# Patient Record
Sex: FEMALE | Race: WHITE | Employment: UNEMPLOYED | URBAN - METROPOLITAN AREA
[De-identification: names, ages, dates, MRNs, and addresses within clinical notes are randomized per-mention and may not be internally consistent; named-entity substitution may affect disease eponyms.]

---

## 2017-01-27 ENCOUNTER — HOSPITAL ENCOUNTER (INPATIENT)
Age: 57
LOS: 14 days | Discharge: HOME | DRG: 885 | End: 2017-02-10
Attending: PSYCHIATRY & NEUROLOGY
Payer: MEDICARE

## 2017-01-27 PROBLEM — R41.89 ALTERED THOUGHT PROCESSES: Status: ACTIVE | Noted: 2017-01-27

## 2017-01-27 PROBLEM — F06.1 CATATONIC STATE: Status: ACTIVE | Noted: 2017-01-27

## 2017-01-27 PROBLEM — F31.64 BIPOLAR AFFECTIVE, MIXED, SEV W/ PSYCH: Status: ACTIVE | Noted: 2017-01-27

## 2017-01-27 PROBLEM — F25.0 SCHIZOAFFECTIVE DISORDER, BIPOLAR TYPE (HCC): Status: ACTIVE | Noted: 2017-01-27

## 2017-01-27 PROCEDURE — 74011250637 HC RX REV CODE- 250/637: Performed by: PSYCHIATRY & NEUROLOGY

## 2017-01-27 PROCEDURE — 65220000003 HC RM SEMIPRIVATE PSYCH

## 2017-01-27 PROCEDURE — 74011250636 HC RX REV CODE- 250/636

## 2017-01-27 PROCEDURE — 74011000250 HC RX REV CODE- 250

## 2017-01-27 RX ORDER — OLANZAPINE 5 MG/1
5 TABLET ORAL
Status: DISCONTINUED | OUTPATIENT
Start: 2017-01-27 | End: 2017-02-10 | Stop reason: HOSPADM

## 2017-01-27 RX ORDER — HALOPERIDOL 5 MG/ML
1 INJECTION INTRAMUSCULAR 2 TIMES DAILY
Status: DISCONTINUED | OUTPATIENT
Start: 2017-01-27 | End: 2017-01-27

## 2017-01-27 RX ORDER — ZOLPIDEM TARTRATE 10 MG/1
10 TABLET ORAL
Status: DISCONTINUED | OUTPATIENT
Start: 2017-01-27 | End: 2017-02-10 | Stop reason: HOSPADM

## 2017-01-27 RX ORDER — ADHESIVE BANDAGE
30 BANDAGE TOPICAL DAILY PRN
Status: DISCONTINUED | OUTPATIENT
Start: 2017-01-27 | End: 2017-02-10 | Stop reason: HOSPADM

## 2017-01-27 RX ORDER — RISPERIDONE 1 MG/1
1 TABLET, FILM COATED ORAL 2 TIMES DAILY
Status: DISCONTINUED | OUTPATIENT
Start: 2017-01-27 | End: 2017-01-30

## 2017-01-27 RX ORDER — HALOPERIDOL 5 MG/ML
1 INJECTION INTRAMUSCULAR 2 TIMES DAILY
Status: DISCONTINUED | OUTPATIENT
Start: 2017-01-27 | End: 2017-02-02

## 2017-01-27 RX ORDER — RISPERIDONE 1 MG/1
1 TABLET, FILM COATED ORAL 2 TIMES DAILY
Status: DISCONTINUED | OUTPATIENT
Start: 2017-01-27 | End: 2017-01-27

## 2017-01-27 RX ORDER — ACETAMINOPHEN 325 MG/1
650 TABLET ORAL
Status: DISCONTINUED | OUTPATIENT
Start: 2017-01-27 | End: 2017-02-10 | Stop reason: HOSPADM

## 2017-01-27 RX ORDER — LORAZEPAM 2 MG/ML
2 INJECTION INTRAMUSCULAR
Status: DISCONTINUED | OUTPATIENT
Start: 2017-01-27 | End: 2017-02-10 | Stop reason: HOSPADM

## 2017-01-27 RX ORDER — IBUPROFEN 400 MG/1
400 TABLET ORAL
Status: DISCONTINUED | OUTPATIENT
Start: 2017-01-27 | End: 2017-02-10 | Stop reason: HOSPADM

## 2017-01-27 RX ORDER — LORAZEPAM 1 MG/1
1 TABLET ORAL
Status: DISCONTINUED | OUTPATIENT
Start: 2017-01-27 | End: 2017-02-10 | Stop reason: HOSPADM

## 2017-01-27 RX ORDER — HALOPERIDOL 5 MG/ML
2 INJECTION INTRAMUSCULAR 2 TIMES DAILY
Status: DISCONTINUED | OUTPATIENT
Start: 2017-01-27 | End: 2017-01-30

## 2017-01-27 RX ORDER — IBUPROFEN 200 MG
1 TABLET ORAL
Status: DISCONTINUED | OUTPATIENT
Start: 2017-01-27 | End: 2017-02-10 | Stop reason: HOSPADM

## 2017-01-27 RX ADMIN — DIVALPROEX SODIUM 750 MG: 500 TABLET, FILM COATED, EXTENDED RELEASE ORAL at 17:37

## 2017-01-27 RX ADMIN — LORAZEPAM 2 MG: 2 INJECTION INTRAMUSCULAR; INTRAVENOUS at 09:09

## 2017-01-27 RX ADMIN — WATER 20 MG: 1 INJECTION INTRAMUSCULAR; INTRAVENOUS; SUBCUTANEOUS at 09:09

## 2017-01-27 RX ADMIN — RISPERIDONE 1 MG: 1 TABLET ORAL at 11:29

## 2017-01-27 RX ADMIN — RISPERIDONE 1 MG: 1 TABLET ORAL at 17:36

## 2017-01-27 NOTE — BH NOTES
ADMISSION NOTE:    Pt is a 50 yo female here due to reported catatonic behavior. She was observed standing motionless in her home not answering for 30 minutes. She has h/o schizoaffective d/o , bipolar type. She present today irritable , speaking at will , yelling out occasionally especially when her needs are not met immediately, demanding. She has h/o HTN, hyperlipidemia and hypopotassemia. She is mumbling at times to herself as if responding to internal stimuli. She denies pain or discomfort. Her skin assessment showed an old surgical scar on the abdomen. Her right foot had callous on the bottom and bunion. The left foot had bunion.  All contraband was removed

## 2017-01-27 NOTE — H&P
INITIAL PSYCHIATRIC EVALUATION         IDENTIFICATION:    Patient Name  Farideh Hayward   Date of Birth 1960   [facility] [id number]   Medical Record Number  [id number]      Age  49 y.o. PCP None   Admit date:  1/27/2017    Room Number  973/38  @ [facility] (formerly [facility]3]   Date of Service  1/27/2017            HISTORY         REASON FOR HOSPITALIZATION:  CC: \"catatonic and psychotic\". Pt admitted under a temporary assisted order (TDO) for severe psychosis and raeann proving to be/posing an imminent danger to self and others and an inability to care for self. HISTORY OF PRESENT ILLNESS:    The patient, Farideh Hayward, is a 49 y.o. WHITE OR   WHITE OR  female with a past psychiatric history significant for bipolar vs schizophrenia, who presents at this time with complaints of (and/or evidence of) the following emotional symptoms: psychotic behavior, paranoid behavior, raeann, psychosis and catatonia. Additional symptomatology include loud, hyperverbal, combative, demanding, entitled, anger outbursts, difficulty sleeping and hearing voices. The above symptoms have been present for a week. These symptoms are of severe severity. These symptoms are constant in nature. The patient's condition has been precipitated by psychosocial stressors. Patient's condition made worse by treatment noncompliance. UDS +negative; BAL=0. Pt given IM psych meds on adm due to severe combativeness.    ALLERGIES:  Allergies   Allergen Reactions    Augmentin [Amoxicillin-Pot Clavulanate] Rash    Codeine Nausea Only    Cogentin [Benztropine] Other (comments)     Makes her sick    Iodine Other (comments)    Macrodantin [Nitrofurantoin Macrocrystalline] Rash    Morphine Shortness of Breath    Mucinex [Guaifenesin] Shortness of Breath    Oxycodone Other (comments)    Prednisone Anxiety      MEDICATIONS PRIOR TO ADMISSION:  Prescriptions Prior to Admission   Medication Sig    metoprolol tartrate (LOPRESSOR) 25 mg tablet Take 0.5 Tabs by mouth every twelve (12) hours. Indications: HYPERTENSION    risperiDONE (RISPERDAL) 2 mg tablet Take 1 Tab by mouth two (2) times a day. Indications: BIPOLAR DISORDER IN REMISSION, SCHIZOPHRENIA    DULoxetine (CYMBALTA) 30 mg capsule Take 1 Cap by mouth nightly. Indications: ANXIETY WITH DEPRESSION, DEPRESSION    DULoxetine (CYMBALTA) 60 mg capsule Take 1 Cap by mouth daily. Indications: ANXIETY WITH DEPRESSION, DEPRESSION    aspirin (ASPIRIN) 325 mg tablet Take 325 mg by mouth daily. Indications: MYOCARDIAL INFARCTION PREVENTION    hydrOXYzine (ATARAX) 50 mg tablet Take 50 mg by mouth every six (6) hours as needed for Anxiety. Indications: ANXIETY    melatonin 3 mg tablet Take 3 mg by mouth nightly. Indications: INSOMNIA    omeprazole (PRILOSEC) 20 mg capsule Take 20 mg by mouth daily. Indications: HEARTBURN    simvastatin (ZOCOR) 40 mg tablet Take 40 mg by mouth nightly. Indications: HYPERCHOLESTEROLEMIA    cholecalciferol, vitamin D3, 2,000 unit tab Take 8,000 Int'l Units by mouth daily. Indications: PREVENTION OF VITAMIN D DEFICIENCY    magnesium oxide (MAG-OX) 400 mg tablet Take 400 mg by mouth daily. Indications: HYPOMAGNESEMIA      PAST MEDICAL HISTORY:  Past Medical History   Diagnosis Date    Anxiety     Asthma     Bipolar affective disorder ([facility] [place] [address]     Chronic back pain     CVA (cerebral vascular accident) ([facility] [place] [address]     Diabetes mellitus, type 2 ([facility] [place] [address]     Dyslipidemia     Fibromyalgia     GERD (gastroesophageal reflux disease)     Hypertension     Schizophrenia ([facility] [place] [address]      Past Surgical History   Procedure Laterality Date    Hx knee arthroscopy      Hx bunionectomy      Hx cholecystectomy      Hx tubal ligation      Hx hysterectomy      Hx other surgical       orbit blow out repair.       SOCIAL HISTORY:    Social History     Social History    Marital status: SINGLE     Spouse name: N/A    Number of children: N/A    Years of education: N/A     Occupational History    Not on file. Social History Main Topics    Smoking status: Current Every Day Smoker     Types: Cigarettes    Smokeless tobacco: Not on file    Alcohol use Not on file    Drug use: Yes     Special: Opiates    Sexual activity: Not on file     Other Topics Concern    Not on file     Social History Narrative    Lives in a trailer alone. . On SSDI. [employer]. HS diploma. Has one son. FAMILY HISTORY: History reviewed. No pertinent family history. History reviewed. No pertinent family history. REVIEW OF SYSTEMS:   Psychological ROS: positive for - anxiety and hostility  negative for - suicidal ideation  Pertinent items are noted in the History of Present Illness. All other Systems reviewed and are considered negative. MENTAL STATUS EXAM & VITALS         MENTAL STATUS EXAM (MSE):    MSE FINDINGS ARE WITHIN NORMAL LIMITS (WNL) UNLESS OTHERWISE STATED BELOW. ( ALL OF THE BELOW CATEGORIES OF THE MSE HAVE BEEN REVIEWED (reviewed 1/27/2017) AND UPDATED AS DEEMED APPROPRIATE )  General Presentation age appropriate, disheveled and overweight, uncooperative and unreliable   Orientation disorganized, lethargic, not oriented to situation   Vital Signs  See below (reviewed 1/27/2017); Vital Signs (BP, Pulse, & Temp) are within normal limits if not listed below.    Gait and Station Stable/steady, no ataxia   Musculoskeletal System No extrapyramidal symptoms (EPS); no abnormal muscular movements or Tardive Dyskinesia (TD); muscle strength and tone are within normal limits   Language No aphasia or dysarthria   Speech:  hyperverbal   Thought Processes illogical; fast rate of thoughts; poor abstract reasoning/computation   Thought Associations tangential   Thought Content paranoid delusions and auditory hallucinations   Suicidal Ideations none   Homicidal Ideations none   Mood:  angry, hostile , irritable and labile    Affect:  irritable and labile   Memory recent  intact   Memory remote: intact   Concentration/Attention:  distractable   Fund of Knowledge below average   Insight:  limited   Reliability poor   Judgment:  limited            VITALS:     Patient Vitals for the past 24 hrs:   Temp Pulse Resp BP   01/27/17 0545 96 °F (35.6 °C) 77 18 (!) 134/96     Wt Readings from Last 3 Encounters:   07/07/16 96.8 kg (213 lb 6.4 oz)     Temp Readings from Last 3 Encounters:   01/27/17 96 °F (35.6 °C)   07/08/16 98.2 °F (36.8 °C)     BP Readings from Last 3 Encounters:   01/27/17 (!) 134/96   07/08/16 119/60     Pulse Readings from Last 3 Encounters:   01/27/17 77   07/08/16 94            DATA       LABORATORY DATA:  Labs Reviewed - No data to display  No visits with results within 2 Day(s) from this visit. Latest known visit with results is:    Admission on 07/05/2016, Discharged on 07/08/2016   Component Date Value Ref Range Status    Glucose 07/06/2016 98  65 - 100 MG/DL Final    TSH 07/06/2016 0.72  0.36 - 3.74 uIU/mL Final    LIPID PROFILE 07/06/2016        Final    Cholesterol, total 07/06/2016 152  <200 MG/DL Final    Triglyceride 07/06/2016 112  <150 MG/DL Final    HDL Cholesterol 07/06/2016 38  MG/DL Final    LDL, calculated 07/06/2016 91.6  0 - 100 MG/DL Final    VLDL, calculated 07/06/2016 22.4  MG/DL Final    CHOL/HDL Ratio 07/06/2016 4.0  0 - 5.0   Final    Hemoglobin A1c 07/06/2016 5.5  4.2 - 6.3 % Final    Est. average glucose 07/06/2016 111  mg/dL Final    Glucose (POC) 07/06/2016 104* 65 - 100 mg/dL Final    Performed by 07/06/2016 Terrell Amend   Final        RADIOLOGY REPORTS:  No results found for this or any previous visit. No results found.            MEDICATIONS       ALL MEDICATIONS  Current Facility-Administered Medications   Medication Dose Route Frequency    ziprasidone (GEODON) 20 mg in sterile water (preservative free) 1 mL injection  20 mg IntraMUSCular BID PRN    OLANZapine (ZyPREXA) tablet 5 mg  5 mg Oral Q6H PRN    LORazepam (ATIVAN) injection 2 mg  2 mg IntraMUSCular Q4H PRN    LORazepam (ATIVAN) tablet 1 mg  1 mg Oral Q4H PRN    zolpidem (AMBIEN) tablet 10 mg  10 mg Oral QHS PRN    acetaminophen (TYLENOL) tablet 650 mg  650 mg Oral Q4H PRN    ibuprofen (MOTRIN) tablet 400 mg  400 mg Oral Q8H PRN    magnesium hydroxide (MILK OF MAGNESIA) 400 mg/5 mL oral suspension 30 mL  30 mL Oral DAILY PRN    nicotine (NICODERM CQ) 21 mg/24 hr patch 1 Patch  1 Patch TransDERmal DAILY PRN    risperiDONE (RisperDAL) tablet 1 mg  1 mg Oral BID    Or    haloperidol lactate (HALDOL) injection 2 mg  2 mg IntraMUSCular BID    divalproex ER (DEPAKOTE ER) 24 hour tablet 750 mg  750 mg Oral BID    Or    haloperidol lactate (HALDOL) injection 1 mg  1 mg IntraMUSCular BID      SCHEDULED MEDICATIONS  Current Facility-Administered Medications   Medication Dose Route Frequency    risperiDONE (RisperDAL) tablet 1 mg  1 mg Oral BID    Or    haloperidol lactate (HALDOL) injection 2 mg  2 mg IntraMUSCular BID    divalproex ER (DEPAKOTE ER) 24 hour tablet 750 mg  750 mg Oral BID    Or    haloperidol lactate (HALDOL) injection 1 mg  1 mg IntraMUSCular BID                ASSESSMENT & PLAN        The patient Payton Pierre is a 49 y.o.  female who presents at this time for treatment of the following diagnoses:  Patient Active Hospital Problem List:   Bipolar affective, mixed, sev w/ psych vs schizoaffective dis vs schizophrenia    Assessment: wide differential. Need more collateral info    Plan: start mood stabilizer and AP along with benzos. I will monitor blood levels (Depakote---a drug with a narrow therapeutic index= NTI) and associated labs for drug therapy implemented that require intense monitoring for toxicity as deemed appropriate base on current medication side effects and pharmacodynamically determined drug 1/2 lives. Will get a med petition.    Essential hypertension (7/6/2016)    Assessment: elevated    Plan: restart bp meds    Catatonic state ([facility] [address] (1/27/2017)    Assessment: related to underlying psychotic/mood dis, now resolved    Plan: tx underlying psych etiology. Monitor for malignant catatonia, dc APs if vitals escalate          In summary, Delvis Pruett presents with a severe exacerbation of the principal diagnosis, Bipolar affective, mixed, sev w/ psych [facility])    While on the unit Delvis Pruett will be provided with individual, milieu, occupational, group, and substance abuse therapies to address target symptoms as deemed appropriate for the individual patient. I agree with decision to admit patient. I have spoken to [facility] psychiatric /ED staff regarding the nature of patients's admission at this time. A coordinated, multidisplinary treatment team (includes the nurse, unit pharmcist,  and writer) round was conducted for this initial evaluation with the patient present. The following regarding medications was addressed during rounds with patient:   the risks and benefits of the proposed medication. The patient was given the opportunity to ask questions. Informed consent given to the use of the above medications. I will continue to adjust psychiatric and non-psychiatric medications (see above \"medication\" section and orders section for details) as deemed appropriate & based upon diagnoses and response to treatment. I have reviewed admission (and previous/old) labs and medical tests in the EHR and or transferring hospital documents. I will continue to order blood tests/labs and diagnostic tests as deemed appropriate and review results as they become available (see orders for details). I have reviewed old psychiatric and medical records available in the EHR. I Will order additional psychiatric records from other institutions to further elucidate the nature of patient's psychopathology and review once available.     I will gather additional collateral information from friends, family and o/p treatment team to further elucidate the nature of patient's psychopathology and baselline level of psychiatric functioning.         ESTIMATED LENGTH OF STAY:   9 days       STRENGTHS:  Exercising self-direction/Resourceful and Access to housing/residential stability                      SIGNED:    Brooklynn Forrest MD  1/27/2017

## 2017-01-27 NOTE — BH NOTES
Patient is agitated. Preoccupied. Not following redirections. Patient given ativan and Geodon. Wll continue to monitor  Patient and assess needs.

## 2017-01-27 NOTE — BH NOTES
SOCIAL WORK NOTE:  Pt did not attend processing group.      HARLEY Harvey, Supervisee in Social Work

## 2017-01-27 NOTE — BH NOTES
Social Work     Pt is a 49year old white female admitted on a TDO status for severe psychosis and agitation. Per nursing note, prior to admission pt exhibited catatonic behavior for 30 minutes in which she was observed standing motionless in her home and would not answer others. Pt's past psychiatric history includes Schizoaffective disorder, bipolar type. Additional symptomology includes: psychosis (as evidenced by mumbling to herself and responding to internal stimuli), irritability, and agitation. Pt has history of 9 past psychiatric hospitalizations; last hospitalization was at [address] [place] [address] in July 2016. Pt is followed by [facility] PACT team since 1/9/17. Pt uses the following substances: alcohol. Upon admission, pt's UDS negative, BAL=0. Pt is  and has one adult son. Pt is unemployed and receives SSDI as income. Highest level of education is HS grad. Pt is alert and oriented x3. Pt denies SI/HI. Pt's mood is irritable, labile; affect is irritable. Thought process is illogical, abstract reasoning is poor. Insight and judgment is limited, reliability is poor. Pt's son Mookie Clancy (phone: [phone]; [phone]) is listed as collateral contact. Social work department will coordinate pt's discharge plans. ADDENDUM  2/1/17:     Pt was hospitalized 4 other times at [address] [place] [address]). The dates of pt's most recent hospitalizations are as follows:    7/9/16-7/21/16, 9/12/16-11/4/16, 11/9/16-11/14/16, and 11/15/16-1/4/17.

## 2017-01-27 NOTE — IP AVS SNAPSHOT
Jamarcuschandra Blount 
 
 
 [address] Sarah Beth Naranjo Patient: Ada Christie MRN: [id number] You are allergic to the following Allergen Reactions Augmentin (Amoxicillin-Pot Clavulanate) Rash Codeine Nausea Only Cogentin (Benztropine) Other (comments) Makes her sick Iodine Other (comments) Macrodantin (Nitrofurantoin Macrocrystalline) Rash Morphine Shortness of Breath Mucinex (Guaifenesin) Shortness of Breath Oxycodone Other (comments) Prednisone Anxiety Recent Documentation Height Weight Breastfeeding? BMI Smoking Status 1.575 m 91.7 kg No 36.97 kg/m2 Current Every Day Smoker Emergency Contacts Name Discharge Info Relation Home Work Mobile Grisel Carballo [22]   [phone] About your hospitalization You were admitted on:  2017 You last received care in the:  [address] You were discharged on:  February 10, 2017 Unit phone number:  [phone] Why you were hospitalized Your primary diagnosis was:  Bipolar Affective, Mixed, Sev W/ Psych (Hcc) Your diagnoses also included:  Schizoaffective Disorder, Bipolar Type (Hcc), Essential Hypertension, Catatonic State (Hcc) Providers Seen During Your Hospitalizations Provider Role Specialty Primary office phone Wanda Raygoza MD Attending Provider Psychiatry [phone] Yordy Leon MD Attending Provider Psychiatry [phone] Your Primary Care Physician (PCP) Primary Care Physician Office Phone Office Fax NONE ** None ** ** None ** Follow-up Information Follow up With Details Comments Contact Info None   None (395) Patient stated that they have no PCP  [facility]  Please follow up with [facility] Team on Saturday 2/11/17 at 11:00am for a case management appointment. A clinician from the PACT Team will come to your home for this appointment tomorrow between 11:00am-12:00pm.  Jewel Gee [address] [place] [address] Phone: [phone] Fax: [phone] Emergency Services: [phone] Current Discharge Medication List  
  
START taking these medications Dose & Instructions Dispensing Information Comments Morning Noon Evening Bedtime  
 divalproex  mg ER tablet Commonly known as:  DEPAKOTE ER Your next dose is: Today, Tomorrow Other:  _________ Dose:  1500 mg Take 3 Tabs by mouth nightly. Indications: BIPOLAR DISORDER IN REMISSION Quantity:  42 Tab Refills:  1 CONTINUE these medications which have CHANGED Dose & Instructions Dispensing Information Comments Morning Noon Evening Bedtime  
 risperiDONE 4 mg tablet Commonly known as:  RisperDAL What changed:   
- medication strength 
- how much to take - when to take this Your next dose is: Today, Tomorrow Other:  _________ Dose:  4 mg Take 1 Tab by mouth nightly. Indications: BIPOLAR DISORDER IN REMISSION, SCHIZOPHRENIA Quantity:  14 Tab Refills:  1 CONTINUE these medications which have NOT CHANGED Dose & Instructions Dispensing Information Comments Morning Noon Evening Bedtime  
 aspirin 325 mg tablet Commonly known as:  ASPIRIN Your next dose is: Today, Tomorrow Other:  _________ Dose:  325 mg Take 325 mg by mouth daily. Indications: MYOCARDIAL INFARCTION PREVENTION Refills:  0  
     
   
   
   
  
 cholecalciferol (vitamin D3) 2,000 unit Tab Your next dose is: Today, Tomorrow Other:  _________ Dose:  8000 Int'l Units Take 8,000 Int'l Units by mouth daily. Indications: PREVENTION OF VITAMIN D DEFICIENCY Refills:  0 magnesium oxide 400 mg tablet Commonly known as:  MAG-OX Your next dose is: Today, Tomorrow Other:  _________ Dose:  400 mg Take 400 mg by mouth daily. Indications: HYPOMAGNESEMIA Refills:  0  
     
   
   
   
  
 metoprolol tartrate 25 mg tablet Commonly known as:  LOPRESSOR Your next dose is: Today, Tomorrow Other:  _________ Dose:  12.5 mg Take 0.5 Tabs by mouth every twelve (12) hours. Indications: HYPERTENSION Quantity:  30 Tab Refills:  0  
     
   
   
   
  
 simvastatin 40 mg tablet Commonly known as:  ZOCOR Your next dose is: Today, Tomorrow Other:  _________ Dose:  40 mg Take 40 mg by mouth nightly. Indications: HYPERCHOLESTEROLEMIA Refills:  0 STOP taking these medications DULoxetine 30 mg capsule Commonly known as:  CYMBALTA DULoxetine 60 mg capsule Commonly known as:  CYMBALTA  
   
  
 hydrOXYzine HCl 50 mg tablet Commonly known as:  ATARAX  
   
  
 melatonin 3 mg tablet  
   
  
 omeprazole 20 mg capsule Commonly known as:  PRILOSEC Where to Get Your Medications Information on where to get these meds will be given to you by the nurse or doctor. ! Ask your nurse or doctor about these medications  
  divalproex  mg ER tablet  
 risperiDONE 4 mg tablet Discharge Instructions DISCHARGE SUMMARY from Nurse The following personal items are in your possession at time of discharge: 
 
Dental Appliances: None Visual Aid: None Home Medications: None Jewelry: None Other Valuables: Gillian Ripper Personal Items Sent to Safe:  (keys) PATIENT INSTRUCTIONS: 
 
 
F-face looks uneven A-arms unable to move or move unevenly S-speech slurred or non-existent T-time-call 911 as soon as signs and symptoms begin-DO NOT go  
 Back to bed or wait to see if you get better-TIME IS BRAIN. Warning Signs of HEART ATTACK Call 911 if you have these symptoms: 
? Chest discomfort. Most heart attacks involve discomfort in the center of the chest that lasts more than a few minutes, or that goes away and comes back. It can feel like uncomfortable pressure, squeezing, fullness, or pain. ? Discomfort in other areas of the upper body. Symptoms can include pain or discomfort in one or both arms, the back, neck, jaw, or stomach. ? Shortness of breath with or without chest discomfort. ? Other signs may include breaking out in a cold sweat, nausea, or lightheadedness. Don't wait more than five minutes to call [address]! Fast action can save your life. Calling 911 is almost always the fastest way to get lifesaving treatment. Emergency Medical Services staff can begin treatment when they arrive  up to an hour sooner than if someone gets to the hospital by car. If I feel that I can not keep these promises and I am at risk of hurting myself or others, I will call the crisis office and speak with a crisis worker who will assist me during my crisis. [facility]                [phone] [address] [place] [address],[place] [address]                      [phone] [address] [place] [address] Crisis           [phone] [facility] Crisis            [phone] The discharge information has been reviewed with the patient. The patient verbalized understanding. Discharge medications reviewed with the patient and appropriate educational materials and side effects teaching were provided. Discharge Orders None [facility] Announcement We are excited to announce that we are making your provider's discharge notes available to you in [employer].   You will see these notes when they are completed and signed by the physician that discharged you from your recent hospital stay. If you have any questions or concerns about any information you see in [employer], please call the Health Information Department where you were seen or reach out to your Primary Care Provider for more information about your plan of care. Introducing hospitals & HEALTH SERVICES! Janiya Sorensen introduces [employer] patient portal. Now you can access parts of your medical record, email your doctor's office, and request medication refills online. 1. In your internet browser, go to [email or web address] 2. Click on the First Time User? Click Here link in the Sign In box. You will see the New Member Sign Up page. 3. Enter your [employer] Access Code exactly as it appears below. You will not need to use this code after youve completed the sign-up process. If you do not sign up before the expiration date, you must request a new code. · [employer] Access Code: [email or web address] Expires: 4/27/2017  4:31 AM 
 
4. Enter the last four digits of your Social Security Number (xxxx) and Date of Birth (mm/dd/yyyy) as indicated and click Submit. You will be taken to the next sign-up page. 5. Create a [employer] ID. This will be your [employer] login ID and cannot be changed, so think of one that is secure and easy to remember. 6. Create a [employer] password. You can change your password at any time. 7. Enter your Password Reset Question and Answer. This can be used at a later time if you forget your password. 8. Enter your e-mail address. You will receive e-mail notification when new information is available in [address] 9. Click Sign Up. You can now view and download portions of your medical record. 10. Click the Download Summary menu link to download a portable copy of your medical information. If you have questions, please visit the Frequently Asked Questions section of the [employer] website. Remember, [employer] is NOT to be used for urgent needs. For medical emergencies, dial 911. Now available from your iPhone and Android! General Information Please provide this summary of care documentation to your next provider. Patient Signature:  ____________________________________________________________ Date:  ____________________________________________________________  
  
Rupali Sleight Provider Signature:  ____________________________________________________________ Date:  ____________________________________________________________

## 2017-01-27 NOTE — IP AVS SNAPSHOT
Summary of Care Report The Summary of Care report has been created to help improve care coordination. Users with access to [employer] or [facility] (Web-based application) may access additional patient information including the Discharge Summary. If you are not currently a [address] user and need more information, please call the number listed below in the [place] [address] section and ask to be connected with Medical Records. Facility Information Name Address Phone [facility] [place] [address] [place] [phone] Patient Information Patient Name Sex  Dominique Hinton ([id number]) Female 1960 Discharge Information Admitting Provider Service Area Unit Gamaliel Holder MD / [phone] [address] [place] [address] Acute Behav [facility] / [phone] Discharge Provider Discharge Date/Time Discharge Disposition Destination (none) 2/10/2017 Afternoon (Pending) AHR (none) Patient Language Language ENGLISH [13] Problem List as of 2/10/2017  Date Reviewed: 2016 Codes Priority Class Noted - Resolved Bipolar affective psychosis ([facility] ICD-10-CM: F31.9 ICD-9-CM: 296.80   2016 - Present Essential hypertension ICD-10-CM: I10 
ICD-9-CM: 401.9   2016 - Present RESOLVED: Type II diabetes mellitus ([facility] ICD-10-CM: E11.9 ICD-9-CM: 250.00   2016 - 2016 RESOLVED: History of CVA (cerebrovascular accident) ICD-10-CM: Z86.73 
ICD-9-CM: V12.54   2016 - 2016 RESOLVED: Dyslipidemia ICD-10-CM: E78.5 ICD-9-CM: 272.4   2016 - 2016 RESOLVED: GERD (gastroesophageal reflux disease) ICD-10-CM: K21.9 ICD-9-CM: 530.81   2016 - 2016 RESOLVED: Asthma ICD-10-CM: L43.788 ICD-9-CM: 493.90   2016 - 2016 Schizoaffective disorder, bipolar type ([facility] ICD-10-CM: F25.0 ICD-9-CM: 295.70   1/27/2017 - Present Catatonic state ([facility] ICD-10-CM: R03.5 ICD-9-CM: 781.99, 295.20   1/27/2017 - Present * (Principal)Bipolar affective, mixed, sev w/ psych [facility]) ICD-10-CM: F31.64 ICD-9-CM: 296.64   1/27/2017 - Present You are allergic to the following Allergen Reactions Augmentin (Amoxicillin-Pot Clavulanate) Rash Codeine Nausea Only Cogentin (Benztropine) Other (comments) Makes her sick Iodine Other (comments) Macrodantin (Nitrofurantoin Macrocrystalline) Rash Morphine Shortness of Breath Mucinex (Guaifenesin) Shortness of Breath Oxycodone Other (comments) Prednisone Anxiety Current Discharge Medication List  
  
START taking these medications Dose & Instructions Dispensing Information Comments  
 divalproex  mg ER tablet Commonly known as:  DEPAKOTE ER Dose:  1500 mg Take 3 Tabs by mouth nightly. Indications: BIPOLAR DISORDER IN REMISSION Quantity:  42 Tab Refills:  1 CONTINUE these medications which have CHANGED Dose & Instructions Dispensing Information Comments  
 risperiDONE 4 mg tablet Commonly known as:  RisperDAL What changed:   
- medication strength 
- how much to take - when to take this Dose:  4 mg Take 1 Tab by mouth nightly. Indications: BIPOLAR DISORDER IN REMISSION, SCHIZOPHRENIA Quantity:  14 Tab Refills:  1 CONTINUE these medications which have NOT CHANGED Dose & Instructions Dispensing Information Comments  
 aspirin 325 mg tablet Commonly known as:  ASPIRIN Dose:  325 mg Take 325 mg by mouth daily. Indications: MYOCARDIAL INFARCTION PREVENTION Refills:  0  
   
 cholecalciferol (vitamin D3) 2,000 unit Tab Dose:  8000 Int'l Units Take 8,000 Int'l Units by mouth daily. Indications: PREVENTION OF VITAMIN D DEFICIENCY Refills:  0  
   
 magnesium oxide 400 mg tablet Commonly known as:  MAG-OX Dose:  400 mg Take 400 mg by mouth daily. Indications: HYPOMAGNESEMIA Refills:  0  
   
 metoprolol tartrate 25 mg tablet Commonly known as:  LOPRESSOR Dose:  12.5 mg Take 0.5 Tabs by mouth every twelve (12) hours. Indications: HYPERTENSION Quantity:  30 Tab Refills:  0  
   
 simvastatin 40 mg tablet Commonly known as:  ZOCOR Dose:  40 mg Take 40 mg by mouth nightly. Indications: HYPERCHOLESTEROLEMIA Refills:  0 STOP taking these medications Comments DULoxetine 30 mg capsule Commonly known as:  CYMBALTA DULoxetine 60 mg capsule Commonly known as:  CYMBALTA  
   
   
 hydrOXYzine HCl 50 mg tablet Commonly known as:  ATARAX  
   
   
 melatonin 3 mg tablet  
   
   
 omeprazole 20 mg capsule Commonly known as:  PRILOSEC Follow-up Information Follow up With Details Comments Contact Info None   None (395) Patient stated that they have no PCP [facility]  Please follow up with [facility] PACT Team on Saturday 2/11/17 at 11:00am for a case management appointment. A clinician from the PACT Team will come to your home for this appointment tomorrow between 11:00am-12:00pm.  Jewel Gee [address] [place], [address] [place] [address] Phone: [phone] Fax: [phone] Emergency Services: [phone] Discharge Instructions DISCHARGE SUMMARY from Nurse The following personal items are in your possession at time of discharge: 
 
Dental Appliances: None Visual Aid: None Home Medications: None Jewelry: None Other Valuables: Abel Loya Personal Items Sent to Safe:  (keys) PATIENT INSTRUCTIONS: 
 
 
F-face looks uneven A-arms unable to move or move unevenly S-speech slurred or non-existent T-time-call 911 as soon as signs and symptoms begin-DO NOT go Back to bed or wait to see if you get better-TIME IS BRAIN.  
 
Warning Signs of HEART ATTACK  
 Call 911 if you have these symptoms: 
? Chest discomfort. Most heart attacks involve discomfort in the center of the chest that lasts more than a few minutes, or that goes away and comes back. It can feel like uncomfortable pressure, squeezing, fullness, or pain. ? Discomfort in other areas of the upper body. Symptoms can include pain or discomfort in one or both arms, the back, neck, jaw, or stomach. ? Shortness of breath with or without chest discomfort. ? Other signs may include breaking out in a cold sweat, nausea, or lightheadedness. Don't wait more than five minutes to call [address]! Fast action can save your life. Calling 911 is almost always the fastest way to get lifesaving treatment. Emergency Medical Services staff can begin treatment when they arrive  up to an hour sooner than if someone gets to the hospital by car. If I feel that I can not keep these promises and I am at risk of hurting myself or others, I will call the crisis office and speak with a crisis worker who will assist me during my crisis. [address] [place] [address]                [phone] [address] [place] [address],[address] [place] [address]                      [phone] [address] [place] [address] Crisis           [phone] [facility] Crisis            [phone] The discharge information has been reviewed with the patient. The patient verbalized understanding. Discharge medications reviewed with the patient and appropriate educational materials and side effects teaching were provided. Chart Review Routing History Recipient Method Report Sent By Sonya Dakin Provider Unknown, MD  
Patient not available to ask [address] Mail IP Auto Routed Notes Christine Marmolejo MD [[id number]] 7/6/2016 12:31 PM 07/06/2016 Provider Unknown, MD  
Patient not available to ask [address] Mail IP Auto Routed Notes Oneil Contreras MD [[id number]] 7/7/2016  2:19 PM 07/07/2016

## 2017-01-27 NOTE — IP AVS SNAPSHOT
Current Discharge Medication List  
  
Take these medications at their scheduled times Dose & Instructions Dispensing Information Comments Morning Noon Evening Bedtime  
 aspirin 325 mg tablet Commonly known as:  ASPIRIN Your next dose is: Today, Tomorrow Other:  ____________ Dose:  325 mg Take 325 mg by mouth daily. Indications: MYOCARDIAL INFARCTION PREVENTION Refills:  0  
     
   
   
   
  
 cholecalciferol (vitamin D3) 2,000 unit Tab Your next dose is: Today, Tomorrow Other:  ____________ Dose:  8000 Int'l Units Take 8,000 Int'l Units by mouth daily. Indications: PREVENTION OF VITAMIN D DEFICIENCY Refills:  0  
     
   
   
   
  
 divalproex  mg ER tablet Commonly known as:  DEPAKOTE ER Your next dose is: Today, Tomorrow Other:  ____________ Dose:  1500 mg Take 3 Tabs by mouth nightly. Indications: BIPOLAR DISORDER IN REMISSION Quantity:  42 Tab Refills:  1  
     
   
   
   
  
 magnesium oxide 400 mg tablet Commonly known as:  MAG-OX Your next dose is: Today, Tomorrow Other:  ____________ Dose:  400 mg Take 400 mg by mouth daily. Indications: HYPOMAGNESEMIA Refills:  0  
     
   
   
   
  
 metoprolol tartrate 25 mg tablet Commonly known as:  LOPRESSOR Your next dose is: Today, Tomorrow Other:  ____________ Dose:  12.5 mg Take 0.5 Tabs by mouth every twelve (12) hours. Indications: HYPERTENSION Quantity:  30 Tab Refills:  0  
     
   
   
   
  
 risperiDONE 4 mg tablet Commonly known as:  RisperDAL Your next dose is: Today, Tomorrow Other:  ____________ Dose:  4 mg Take 1 Tab by mouth nightly. Indications: BIPOLAR DISORDER IN REMISSION, SCHIZOPHRENIA Quantity:  14 Tab Refills:  1  
     
   
   
   
  
 simvastatin 40 mg tablet Commonly known as:  ZOCOR  
   
 Your next dose is: Today, Tomorrow Other:  ____________ Dose:  40 mg Take 40 mg by mouth nightly. Indications: HYPERCHOLESTEROLEMIA Refills:  0 Where to Get Your Medications Information about where to get these medications is not yet available ! Ask your nurse or doctor about these medications  
  divalproex  mg ER tablet  
 risperiDONE 4 mg tablet

## 2017-01-28 LAB
GLUCOSE BLD STRIP.AUTO-MCNC: 115 MG/DL (ref 65–100)
SERVICE CMNT-IMP: ABNORMAL

## 2017-01-28 PROCEDURE — 74011250637 HC RX REV CODE- 250/637: Performed by: INTERNAL MEDICINE

## 2017-01-28 PROCEDURE — 74011250637 HC RX REV CODE- 250/637: Performed by: PSYCHIATRY & NEUROLOGY

## 2017-01-28 PROCEDURE — 82962 GLUCOSE BLOOD TEST: CPT

## 2017-01-28 PROCEDURE — 74011250636 HC RX REV CODE- 250/636: Performed by: PSYCHIATRY & NEUROLOGY

## 2017-01-28 PROCEDURE — 65220000003 HC RM SEMIPRIVATE PSYCH

## 2017-01-28 PROCEDURE — 74011250637 HC RX REV CODE- 250/637

## 2017-01-28 RX ORDER — DEXTROSE 50 % IN WATER (D50W) INTRAVENOUS SYRINGE
12.5-25 AS NEEDED
Status: DISCONTINUED | OUTPATIENT
Start: 2017-01-28 | End: 2017-02-10 | Stop reason: HOSPADM

## 2017-01-28 RX ORDER — INSULIN LISPRO 100 [IU]/ML
INJECTION, SOLUTION INTRAVENOUS; SUBCUTANEOUS
Status: DISCONTINUED | OUTPATIENT
Start: 2017-01-28 | End: 2017-02-10 | Stop reason: HOSPADM

## 2017-01-28 RX ORDER — ASPIRIN 325 MG
325 TABLET ORAL DAILY
Status: DISCONTINUED | OUTPATIENT
Start: 2017-01-28 | End: 2017-02-10 | Stop reason: HOSPADM

## 2017-01-28 RX ORDER — METOPROLOL TARTRATE 25 MG/1
12.5 TABLET, FILM COATED ORAL EVERY 12 HOURS
Status: DISCONTINUED | OUTPATIENT
Start: 2017-01-28 | End: 2017-01-30

## 2017-01-28 RX ORDER — PANTOPRAZOLE SODIUM 40 MG/1
40 TABLET, DELAYED RELEASE ORAL
Status: DISCONTINUED | OUTPATIENT
Start: 2017-01-29 | End: 2017-02-10 | Stop reason: HOSPADM

## 2017-01-28 RX ORDER — ATORVASTATIN CALCIUM 40 MG/1
40 TABLET, FILM COATED ORAL
Status: DISCONTINUED | OUTPATIENT
Start: 2017-01-28 | End: 2017-02-10 | Stop reason: HOSPADM

## 2017-01-28 RX ORDER — MAGNESIUM SULFATE 100 %
4 CRYSTALS MISCELLANEOUS AS NEEDED
Status: DISCONTINUED | OUTPATIENT
Start: 2017-01-28 | End: 2017-02-10 | Stop reason: HOSPADM

## 2017-01-28 RX ORDER — MELATONIN
4000 DAILY
Status: DISCONTINUED | OUTPATIENT
Start: 2017-01-28 | End: 2017-02-10 | Stop reason: HOSPADM

## 2017-01-28 RX ADMIN — ASPIRIN 325 MG ORAL TABLET 325 MG: 325 PILL ORAL at 11:09

## 2017-01-28 RX ADMIN — HALOPERIDOL LACTATE 1 MG: 5 INJECTION, SOLUTION INTRAMUSCULAR at 18:24

## 2017-01-28 RX ADMIN — RISPERIDONE 1 MG: 1 TABLET ORAL at 09:05

## 2017-01-28 RX ADMIN — LORAZEPAM 1 MG: 1 TABLET ORAL at 12:55

## 2017-01-28 RX ADMIN — VITAMIN D, TAB 1000IU (100/BT) 4000 UNITS: 25 TAB at 11:09

## 2017-01-28 RX ADMIN — ATORVASTATIN CALCIUM 40 MG: 40 TABLET, FILM COATED ORAL at 21:16

## 2017-01-28 RX ADMIN — METOPROLOL TARTRATE 12.5 MG: 25 TABLET, FILM COATED ORAL at 21:16

## 2017-01-28 RX ADMIN — DIVALPROEX SODIUM 750 MG: 500 TABLET, FILM COATED, EXTENDED RELEASE ORAL at 12:58

## 2017-01-28 RX ADMIN — METOPROLOL TARTRATE 12.5 MG: 25 TABLET, FILM COATED ORAL at 12:59

## 2017-01-28 RX ADMIN — HALOPERIDOL LACTATE 2 MG: 5 INJECTION, SOLUTION INTRAMUSCULAR at 18:23

## 2017-01-28 NOTE — H&P
History and Physical    Subjective:     Ada Christie is a 49 y. o. with past medical hx significant for Asthma, Chronic back pain, CVA, DM, Dyslipidemia, GERD, HTN. Pt is admitted to the hospital with a diagnosis of schizoaffective disorder. Pt is not showing any signs of acute distress. Past Medical History   Diagnosis Date    Anxiety     Asthma     [facility] [address]     Chronic back pain     CVA (cerebral vascular accident) [facility] [address]     Diabetes mellitus, type 2 ([facility] [address]     Dyslipidemia     Fibromyalgia     GERD (gastroesophageal reflux disease)     Hypertension     Schizophrenia ([facility] [address]       Past Surgical History   Procedure Laterality Date    Hx knee arthroscopy      Hx bunionectomy      Hx cholecystectomy      Hx tubal ligation      Hx hysterectomy      Hx other surgical       orbit blow out repair. FHx:none declared by pt. Social History   Substance Use Topics    Smoking status: Current Every Day Smoker     Types: Cigarettes    Smokeless tobacco: None    Alcohol use None       Prior to Admission medications    Medication Sig Start Date End Date Taking? Authorizing Provider   metoprolol tartrate (LOPRESSOR) 25 mg tablet Take 0.5 Tabs by mouth every twelve (12) hours. Indications: HYPERTENSION 7/7/16   Tania Bynum MD   risperiDONE (RISPERDAL) 2 mg tablet Take 1 Tab by mouth two (2) times a day. Indications: BIPOLAR DISORDER IN REMISSION, SCHIZOPHRENIA 7/7/16   Tania Bynum MD   DULoxetine (CYMBALTA) 30 mg capsule Take 1 Cap by mouth nightly. Indications: ANXIETY WITH DEPRESSION, DEPRESSION 7/7/16   Tania Bynum MD   DULoxetine (CYMBALTA) 60 mg capsule Take 1 Cap by mouth daily. Indications: ANXIETY WITH DEPRESSION, DEPRESSION 7/7/16   Tania Bynum MD   aspirin (ASPIRIN) 325 mg tablet Take 325 mg by mouth daily.  Indications: MYOCARDIAL INFARCTION PREVENTION    Historical Provider   hydrOXYzine (ATARAX) 50 mg tablet Take 50 mg by mouth every six (6) hours as needed for Anxiety. Indications: ANXIETY    Historical Provider   melatonin 3 mg tablet Take 3 mg by mouth nightly. Indications: INSOMNIA    Historical Provider   omeprazole (PRILOSEC) 20 mg capsule Take 20 mg by mouth daily. Indications: HEARTBURN    Historical Provider   simvastatin (ZOCOR) 40 mg tablet Take 40 mg by mouth nightly. Indications: HYPERCHOLESTEROLEMIA    Historical Provider   cholecalciferol, vitamin D3, 2,000 unit tab Take 8,000 Int'l Units by mouth daily. Indications: PREVENTION OF VITAMIN D DEFICIENCY    Historical Provider   magnesium oxide (MAG-OX) 400 mg tablet Take 400 mg by mouth daily. Indications: HYPOMAGNESEMIA    Historical Provider     Allergies   Allergen Reactions    Augmentin [Amoxicillin-Pot Clavulanate] Rash    Codeine Nausea Only    Cogentin [Benztropine] Other (comments)     Makes her sick    Iodine Other (comments)    Macrodantin [Nitrofurantoin Macrocrystalline] Rash    Morphine Shortness of Breath    Mucinex [Guaifenesin] Shortness of Breath    Oxycodone Other (comments)    Prednisone Anxiety        Review of Systems:  Constitutional: negative  Eyes: negative  Ears, Nose, Mouth, Throat, and Face: negative  Respiratory: negative  Cardiovascular: negative  Gastrointestinal: negative  Genitourinary:negative  Integument/Breast: negative  Hematologic/Lymphatic: negative  Musculoskeletal:negative  Neurological: negative  Behavioral/Psychiatric: schizoaffective disorder  Endocrine: negative  Allergic/Immunologic: negative     Objective: Intake and Output:            Physical Exam:   Visit Vitals    BP (!) 134/96    Pulse 77    Temp 96 °F (35.6 °C)    Resp 18    Breastfeeding No     General:  Alert, cooperative, no distress, appears stated age. Head:  Normocephalic, without obvious abnormality, atraumatic. Eyes:  Conjunctivae/corneas clear. PERRL, EOMs intact. Ears:  Normal external ear canals both ears. Nose: Nares normal. Septum midline.  Mucosa normal. No drainage or sinus tenderness. Throat: Lips, mucosa, and tongue normal. Teeth and gums normal.   Neck: Supple, symmetrical, trachea midline, no adenopathy, thyroid: no enlargement/tenderness/nodules, no carotid bruit and no JVD. Back:   Symmetric, no curvature. ROM normal. No CVA tenderness. Lungs:   Clear to auscultation bilaterally. Chest wall:  No tenderness or deformity. Heart:  Regular rate and rhythm, S1, S2 normal, no murmur, click, rub or gallop. Abdomen:   Soft, non-tender. Bowel sounds normal. No masses,  No organomegaly. Extremities: Extremities normal, atraumatic, no cyanosis or edema. Pulses: 2+ and symmetric all extremities. Skin: Skin color, texture, turgor normal. No rashes or lesions   Lymph nodes: Cervical, supraclavicular, and axillary nodes normal.   Neurologic: CNII-XII intact. Normal strength, sensation and reflexes throughout. Data Review:   No results found for this or any previous visit (from the past 24 hour(s)). Assessment:     Principal Problem:    Bipolar affective, mixed, sev w/ psych ([facility] [address] (1/27/2017)    Active Problems:    Essential hypertension (7/6/2016)      Schizoaffective disorder, bipolar type ([address] (1/27/2017)      Catatonic state ([address] (1/27/2017)    Asthma    Hx CVA    GERD    HTN    DM    Plan: Add Sliding scale Insulin    Restart PPI    Add Aspirin    Restart antihypertensive    No VTE prophylaxis indicated or necessary at this time.    Signed By: Derrick Park MD     January 27, 2017

## 2017-01-28 NOTE — BH NOTES
Shannon Chen who was absent from [facility]. [facility]    Jessica Encinas  1/28/2017  3:07 PM

## 2017-01-28 NOTE — PROGRESS NOTES
Problem: Altered Thought Process (Adult/Pediatric)  Goal: *STG: Participates in treatment plan  Outcome: Progressing Towards Goal  Pt has been isolated to her room most of the time during the shift with labile affect and mood. Pt was food and med compliant, Pt was appropriate with staff and other pts in the [facility]. Pt denied any S/I and H/I at this time. Pt was encouraged to attend all group activities and to discuss any issues or concerns with staff. Staff will also continue to monitor pt with Q -15 checks for safety.

## 2017-01-28 NOTE — BH NOTES
Was received sleeping and observed to be sleeping throughout for 7 hours without any distress and even respirations. Q15min checks maintained. She is ambivalent, she refused her labs after agreeing to cooperate.

## 2017-01-28 NOTE — PROGRESS NOTES
Problem: Altered Thought Process (Adult/Pediatric)  Goal: *STG: Remains safe in hospital  Outcome: Progressing Towards Goal  Pt rested quietly in bed with eyes closed. No signs/symptoms of distress, agitation, or anxiety. Will monitor for changes. Q 15 minute checks continue. Pt has been responding during the night but remained in her room.  She slept 7 hrs

## 2017-01-28 NOTE — BH NOTES
REFLECTIONS GROUP THERAPY PROGRESS NOTE    The patient Destin Fairchild is participating in Reflections Group Therapy. Group time: 30 minutes    Personal goal for participation: Share with group about feelings and concerns throughout the course of the day. Goal orientation: personal    Group therapy participation: active    Therapeutic interventions reviewed and discussed: Yes    Impression of participation:   Positive input.     Paulette Catalina Goldberg  1/27/2017

## 2017-01-28 NOTE — PROGRESS NOTES
Problem: Falls - Risk of  Goal: *Absence of falls  Outcome: Progressing Towards Goal  Patient has been ambulating independently on unit. Patient has been exhibiting bizarre behavior constantly touching things in ritualistic fashion and displaying delayed response to commands at times. Patient received PO medications after initially refusing. Patient required a lot of encouragement and redirection. Patient will remain on q15 min safety checks and will continue to monitor.

## 2017-01-29 LAB
GLUCOSE BLD STRIP.AUTO-MCNC: 104 MG/DL (ref 65–100)
SERVICE CMNT-IMP: ABNORMAL

## 2017-01-29 PROCEDURE — 65220000003 HC RM SEMIPRIVATE PSYCH

## 2017-01-29 PROCEDURE — 82962 GLUCOSE BLOOD TEST: CPT

## 2017-01-29 PROCEDURE — 74011250637 HC RX REV CODE- 250/637: Performed by: PSYCHIATRY & NEUROLOGY

## 2017-01-29 PROCEDURE — 74011250637 HC RX REV CODE- 250/637: Performed by: INTERNAL MEDICINE

## 2017-01-29 PROCEDURE — 74011250636 HC RX REV CODE- 250/636: Performed by: PSYCHIATRY & NEUROLOGY

## 2017-01-29 PROCEDURE — 74011250637 HC RX REV CODE- 250/637

## 2017-01-29 RX ORDER — CLONIDINE HYDROCHLORIDE 0.1 MG/1
0.2 TABLET ORAL
Status: DISCONTINUED | OUTPATIENT
Start: 2017-01-29 | End: 2017-02-10 | Stop reason: HOSPADM

## 2017-01-29 RX ORDER — CLONIDINE HYDROCHLORIDE 0.1 MG/1
0.2 TABLET ORAL EVERY 6 HOURS
Status: DISCONTINUED | OUTPATIENT
Start: 2017-01-29 | End: 2017-01-29

## 2017-01-29 RX ADMIN — DIVALPROEX SODIUM 750 MG: 500 TABLET, FILM COATED, EXTENDED RELEASE ORAL at 09:57

## 2017-01-29 RX ADMIN — RISPERIDONE 1 MG: 1 TABLET ORAL at 18:34

## 2017-01-29 RX ADMIN — ATORVASTATIN CALCIUM 40 MG: 40 TABLET, FILM COATED ORAL at 21:34

## 2017-01-29 RX ADMIN — ZOLPIDEM TARTRATE 10 MG: 10 TABLET, FILM COATED ORAL at 23:42

## 2017-01-29 RX ADMIN — CLONIDINE HYDROCHLORIDE 0.2 MG: 0.1 TABLET ORAL at 16:56

## 2017-01-29 RX ADMIN — ASPIRIN 325 MG ORAL TABLET 325 MG: 325 PILL ORAL at 09:36

## 2017-01-29 RX ADMIN — METOPROLOL TARTRATE 12.5 MG: 25 TABLET, FILM COATED ORAL at 09:42

## 2017-01-29 RX ADMIN — DIVALPROEX SODIUM 750 MG: 500 TABLET, FILM COATED, EXTENDED RELEASE ORAL at 18:34

## 2017-01-29 RX ADMIN — VITAMIN D, TAB 1000IU (100/BT) 4000 UNITS: 25 TAB at 09:33

## 2017-01-29 RX ADMIN — METOPROLOL TARTRATE 12.5 MG: 25 TABLET, FILM COATED ORAL at 21:34

## 2017-01-29 RX ADMIN — RISPERIDONE 1 MG: 1 TABLET ORAL at 09:57

## 2017-01-29 RX ADMIN — PANTOPRAZOLE SODIUM 40 MG: 40 TABLET, DELAYED RELEASE ORAL at 06:23

## 2017-01-29 NOTE — BH NOTES
PSYCHIATRIC PROGRESS NOTE         IDENTIFICATION:    Patient Name  Lise Duval   Date of Birth 1960   [facility] [id number]   Medical Record Number  [id number]      Age  49 y.o. PCP None   Admit date:  1/27/2017    Room Number  143/62  @ [facility]   Date of Service  1/28/2017            HISTORY         REASON FOR HOSPITALIZATION:  CC: \"catatonic and psychotic\". Pt admitted under a temporary long term order (TDO) for severe psychosis and raeann proving to be/posing an imminent danger to self and others and an inability to care for self. HISTORY OF PRESENT ILLNESS:    The patient, Lise Duval, is a 49 y.o. WHITE OR   WHITE OR  female with a past psychiatric history significant for bipolar vs schizophrenia, who presents at this time with complaints of (and/or evidence of) the following emotional symptoms: psychotic behavior, paranoid behavior, raeann, psychosis and catatonia. Additional symptomatology include loud, hyperverbal, combative, demanding, entitled, anger outbursts, difficulty sleeping and hearing voices. The above symptoms have been present for a week. These symptoms are of severe severity. These symptoms are constant in nature. The patient's condition has been precipitated by psychosocial stressors. Patient's condition made worse by treatment noncompliance. UDS +negative; BAL=0. Pt given IM psych meds on adm due to severe combativeness. 1/28-Loud and irritable, refused labs, sarcastic.    ALLERGIES:  Allergies   Allergen Reactions    Augmentin [Amoxicillin-Pot Clavulanate] Rash    Codeine Nausea Only    Cogentin [Benztropine] Other (comments)     Makes her sick    Iodine Other (comments)    Macrodantin [Nitrofurantoin Macrocrystalline] Rash    Morphine Shortness of Breath    Mucinex [Guaifenesin] Shortness of Breath    Oxycodone Other (comments)    Prednisone Anxiety      MEDICATIONS PRIOR TO ADMISSION:  Prescriptions Prior to Admission   Medication Sig    metoprolol tartrate (LOPRESSOR) 25 mg tablet Take 0.5 Tabs by mouth every twelve (12) hours. Indications: HYPERTENSION    risperiDONE (RISPERDAL) 2 mg tablet Take 1 Tab by mouth two (2) times a day. Indications: BIPOLAR DISORDER IN REMISSION, SCHIZOPHRENIA    DULoxetine (CYMBALTA) 30 mg capsule Take 1 Cap by mouth nightly. Indications: ANXIETY WITH DEPRESSION, DEPRESSION    DULoxetine (CYMBALTA) 60 mg capsule Take 1 Cap by mouth daily. Indications: ANXIETY WITH DEPRESSION, DEPRESSION    aspirin (ASPIRIN) 325 mg tablet Take 325 mg by mouth daily. Indications: MYOCARDIAL INFARCTION PREVENTION    hydrOXYzine (ATARAX) 50 mg tablet Take 50 mg by mouth every six (6) hours as needed for Anxiety. Indications: ANXIETY    melatonin 3 mg tablet Take 3 mg by mouth nightly. Indications: INSOMNIA    omeprazole (PRILOSEC) 20 mg capsule Take 20 mg by mouth daily. Indications: HEARTBURN    simvastatin (ZOCOR) 40 mg tablet Take 40 mg by mouth nightly. Indications: HYPERCHOLESTEROLEMIA    cholecalciferol, vitamin D3, 2,000 unit tab Take 8,000 Int'l Units by mouth daily. Indications: PREVENTION OF VITAMIN D DEFICIENCY    magnesium oxide (MAG-OX) 400 mg tablet Take 400 mg by mouth daily. Indications: HYPOMAGNESEMIA      PAST MEDICAL HISTORY:  Past Medical History   Diagnosis Date    Anxiety     Asthma     Bipolar affective disorder ([facility] [address]     Chronic back pain     CVA (cerebral vascular accident) ([facility] [address]     Diabetes mellitus, type 2 ([facility] [address]     Dyslipidemia     Fibromyalgia     GERD (gastroesophageal reflux disease)     Hypertension     Schizophrenia ([facility] [address]      Past Surgical History   Procedure Laterality Date    Hx knee arthroscopy      Hx bunionectomy      Hx cholecystectomy      Hx tubal ligation      Hx hysterectomy      Hx other surgical       orbit blow out repair.       SOCIAL HISTORY:    Social History     Social History    Marital status: SINGLE     Spouse name: N/A    Number of children: N/A    Years of education: N/A     Occupational History    Not on file. Social History Main Topics    Smoking status: Current Every Day Smoker     Types: Cigarettes    Smokeless tobacco: Not on file    Alcohol use Not on file    Drug use: Yes     Special: Opiates    Sexual activity: Not on file     Other Topics Concern    Not on file     Social History Narrative    Lives in a trailer alone. . On SSDI. [employer]. HS diploma. Has one son. FAMILY HISTORY: History reviewed. No pertinent family history. History reviewed. No pertinent family history. REVIEW OF SYSTEMS:   Psychological ROS: positive for - anxiety and hostility  negative for - suicidal ideation  Pertinent items are noted in the History of Present Illness. All other Systems reviewed and are considered negative. MENTAL STATUS EXAM & VITALS         MENTAL STATUS EXAM (MSE):    MSE FINDINGS ARE WITHIN NORMAL LIMITS (WNL) UNLESS OTHERWISE STATED BELOW. ( ALL OF THE BELOW CATEGORIES OF THE MSE HAVE BEEN REVIEWED (reviewed 1/28/2017) AND UPDATED AS DEEMED APPROPRIATE )  General Presentation age appropriate, disheveled and overweight, uncooperative and unreliable   Orientation disorganized, lethargic, not oriented to situation   Vital Signs  See below (reviewed 1/28/2017); Vital Signs (BP, Pulse, & Temp) are within normal limits if not listed below.    Gait and Station Stable/steady, no ataxia   Musculoskeletal System No extrapyramidal symptoms (EPS); no abnormal muscular movements or Tardive Dyskinesia (TD); muscle strength and tone are within normal limits   Language No aphasia or dysarthria   Speech:  hyperverbal   Thought Processes illogical; fast rate of thoughts; poor abstract reasoning/computation   Thought Associations tangential   Thought Content paranoid delusions and auditory hallucinations   Suicidal Ideations none   Homicidal Ideations none   Mood:  angry, hostile , irritable and labile    Affect:  irritable and labile Memory recent  intact   Memory remote:  intact   Concentration/Attention:  distractable   Fund of Knowledge below average   Insight:  limited   Reliability poor   Judgment:  limited            VITALS:     Patient Vitals for the past 24 hrs:   Temp Pulse Resp BP   01/28/17 1827 98.2 °F (36.8 °C) (!) 101 18 134/77   01/28/17 1252 - (!) 111 18 159/81   01/28/17 0709 97.5 °F (36.4 °C) 81 18 174/90     Wt Readings from Last 3 Encounters:   07/07/16 96.8 kg (213 lb 6.4 oz)     Temp Readings from Last 3 Encounters:   01/28/17 98.2 °F (36.8 °C)   07/08/16 98.2 °F (36.8 °C)     BP Readings from Last 3 Encounters:   01/28/17 134/77   07/08/16 119/60     Pulse Readings from Last 3 Encounters:   01/28/17 (!) 101   07/08/16 94            DATA       LABORATORY DATA:  Labs Reviewed   GLUCOSE, POC - Abnormal; Notable for the following:        Result Value    Glucose (POC) 115 (*)     All other components within normal limits     Admission on 01/27/2017   Component Date Value Ref Range Status    Glucose (POC) 01/28/2017 115* 65 - 100 mg/dL Final    Performed by 01/28/2017 Ramón Edmonds   Final        RADIOLOGY REPORTS:  No results found for this or any previous visit. No results found.            MEDICATIONS       ALL MEDICATIONS  Current Facility-Administered Medications   Medication Dose Route Frequency    aspirin (ASPIRIN) tablet 325 mg  325 mg Oral DAILY    metoprolol tartrate (LOPRESSOR) tablet 12.5 mg  12.5 mg Oral Q12H    cholecalciferol (VITAMIN D3) tablet 4,000 Units  4,000 Units Oral DAILY    [START ON 1/29/2017] pantoprazole (PROTONIX) tablet 40 mg  40 mg Oral ACB    atorvastatin (LIPITOR) tablet 40 mg  40 mg Oral QHS    insulin lispro (HUMALOG) injection   SubCUTAneous ACB&D    glucose chewable tablet 16 g  4 Tab Oral PRN    dextrose (D50W) injection syrg 12.5-25 g  12.5-25 g IntraVENous PRN    glucagon (GLUCAGEN) injection 1 mg  1 mg IntraMUSCular PRN    ziprasidone (GEODON) 20 mg in sterile water (preservative free) 1 mL injection  20 mg IntraMUSCular BID PRN    OLANZapine (ZyPREXA) tablet 5 mg  5 mg Oral Q6H PRN    LORazepam (ATIVAN) injection 2 mg  2 mg IntraMUSCular Q4H PRN    LORazepam (ATIVAN) tablet 1 mg  1 mg Oral Q4H PRN    zolpidem (AMBIEN) tablet 10 mg  10 mg Oral QHS PRN    acetaminophen (TYLENOL) tablet 650 mg  650 mg Oral Q4H PRN    ibuprofen (MOTRIN) tablet 400 mg  400 mg Oral Q8H PRN    magnesium hydroxide (MILK OF MAGNESIA) 400 mg/5 mL oral suspension 30 mL  30 mL Oral DAILY PRN    nicotine (NICODERM CQ) 21 mg/24 hr patch 1 Patch  1 Patch TransDERmal DAILY PRN    risperiDONE (RisperDAL) tablet 1 mg  1 mg Oral BID    Or    haloperidol lactate (HALDOL) injection 2 mg  2 mg IntraMUSCular BID    haloperidol lactate (HALDOL) injection 1 mg++Court-ordered med for refusal of Depakote++  1 mg IntraMUSCular BID    Or    divalproex ER (DEPAKOTE ER) 24 hour tablet 750 mg  750 mg Oral BID      SCHEDULED MEDICATIONS  Current Facility-Administered Medications   Medication Dose Route Frequency    aspirin (ASPIRIN) tablet 325 mg  325 mg Oral DAILY    metoprolol tartrate (LOPRESSOR) tablet 12.5 mg  12.5 mg Oral Q12H    cholecalciferol (VITAMIN D3) tablet 4,000 Units  4,000 Units Oral DAILY    [START ON 1/29/2017] pantoprazole (PROTONIX) tablet 40 mg  40 mg Oral ACB    atorvastatin (LIPITOR) tablet 40 mg  40 mg Oral QHS    insulin lispro (HUMALOG) injection   SubCUTAneous ACB&D    risperiDONE (RisperDAL) tablet 1 mg  1 mg Oral BID    Or    haloperidol lactate (HALDOL) injection 2 mg  2 mg IntraMUSCular BID    haloperidol lactate (HALDOL) injection 1 mg++Court-ordered med for refusal of Depakote++  1 mg IntraMUSCular BID    Or    divalproex ER (DEPAKOTE ER) 24 hour tablet 750 mg  750 mg Oral BID                ASSESSMENT & PLAN        The patient Jose Luis Cabello is a 49 y.o.  female who presents at this time for treatment of the following diagnoses:  Patient Active Hospital Problem List: Bipolar affective, mixed, sev w/ psych vs schizoaffective dis vs schizophrenia    Assessment: wide differential. Need more collateral info    Plan: start mood stabilizer and AP along with benzos. I will monitor blood levels (Depakote---a drug with a narrow therapeutic index= NTI) and associated labs for drug therapy implemented that require intense monitoring for toxicity as deemed appropriate base on current medication side effects and pharmacodynamically determined drug 1/2 lives. Will get a med petition. Essential hypertension (7/6/2016)    Assessment: elevated    Plan: restart bp meds    Catatonic state ([facility] [address] (1/27/2017)    Assessment: related to underlying psychotic/mood dis, now resolved    Plan: tx underlying psych etiology. Monitor for malignant catatonia, dc APs if vitals escalate          In summary, Josee Kapadia presents with a severe exacerbation of the principal diagnosis, Bipolar affective, mixed, sev w/ psych [facility])    While on the unit Josee Kapadia will be provided with individual, milieu, occupational, group, and substance abuse therapies to address target symptoms as deemed appropriate for the individual patient. I agree with decision to admit patient. I have spoken to [facility] psychiatric /ED staff regarding the nature of patients's admission at this time. A coordinated, multidisplinary treatment team (includes the nurse, unit pharmcist,  and writer) round was conducted for this initial evaluation with the patient present. The following regarding medications was addressed during rounds with patient:   the risks and benefits of the proposed medication. The patient was given the opportunity to ask questions. Informed consent given to the use of the above medications.      I will continue to adjust psychiatric and non-psychiatric medications (see above \"medication\" section and orders section for details) as deemed appropriate & based upon diagnoses and response to treatment. I have reviewed admission (and previous/old) labs and medical tests in the EHR and or transferring hospital documents. I will continue to order blood tests/labs and diagnostic tests as deemed appropriate and review results as they become available (see orders for details). I have reviewed old psychiatric and medical records available in the EHR. I Will order additional psychiatric records from other institutions to further elucidate the nature of patient's psychopathology and review once available. I will gather additional collateral information from friends, family and o/p treatment team to further elucidate the nature of patient's psychopathology and baselline level of psychiatric functioning.         ESTIMATED LENGTH OF STAY:   9 days       STRENGTHS:  Exercising self-direction/Resourceful and Access to housing/residential stability                      SIGNED:    Hari Henderson MD  1/28/2017

## 2017-01-29 NOTE — PROGRESS NOTES
Problem: Altered Thought Process (Adult/Pediatric)  Goal: *STG: Complies with medication therapy  Outcome: Progressing Towards Goal  Patient still requiring significant amount of encouragement to take medications but patient did take medications PO rather than receiving court ordered injections. Patient has since been calm and cooperative, following redirection. Will continue to monitor and patient will remain on q15min safety rounds.

## 2017-01-29 NOTE — BH NOTES
INITIAL PSYCHIATRIC EVALUATION         IDENTIFICATION:    Patient Name  Jamar Lei   Date of Birth 1960   [facility] [id number]   Medical Record Number  [id number]      Age  49 y.o. PCP None   Admit date:  1/27/2017    Room Number  439/90  @ [facility]   Date of Service  1/28/2017            HISTORY         REASON FOR HOSPITALIZATION:  CC: \"catatonic and psychotic\". Pt admitted under a temporary shelter order (TDO) for severe psychosis and raeann proving to be/posing an imminent danger to self and others and an inability to care for self. HISTORY OF PRESENT ILLNESS:    The patient, Jamar Lei, is a 49 y.o. WHITE OR   WHITE OR  female with a past psychiatric history significant for bipolar vs schizophrenia, who presents at this time with complaints of (and/or evidence of) the following emotional symptoms: psychotic behavior, paranoid behavior, raeann, psychosis and catatonia. Additional symptomatology include loud, hyperverbal, combative, demanding, entitled, anger outbursts, difficulty sleeping and hearing voices. The above symptoms have been present for a week. These symptoms are of severe severity. These symptoms are constant in nature. The patient's condition has been precipitated by psychosocial stressors. Patient's condition made worse by treatment noncompliance. UDS +negative; BAL=0. Pt given IM psych meds on adm due to severe combativeness. 1/28-Loud and irritable, refused labs, sarcastic.    ALLERGIES:  Allergies   Allergen Reactions    Augmentin [Amoxicillin-Pot Clavulanate] Rash    Codeine Nausea Only    Cogentin [Benztropine] Other (comments)     Makes her sick    Iodine Other (comments)    Macrodantin [Nitrofurantoin Macrocrystalline] Rash    Morphine Shortness of Breath    Mucinex [Guaifenesin] Shortness of Breath    Oxycodone Other (comments)    Prednisone Anxiety      MEDICATIONS PRIOR TO ADMISSION:  Prescriptions Prior to Admission Medication Sig    metoprolol tartrate (LOPRESSOR) 25 mg tablet Take 0.5 Tabs by mouth every twelve (12) hours. Indications: HYPERTENSION    risperiDONE (RISPERDAL) 2 mg tablet Take 1 Tab by mouth two (2) times a day. Indications: BIPOLAR DISORDER IN REMISSION, SCHIZOPHRENIA    DULoxetine (CYMBALTA) 30 mg capsule Take 1 Cap by mouth nightly. Indications: ANXIETY WITH DEPRESSION, DEPRESSION    DULoxetine (CYMBALTA) 60 mg capsule Take 1 Cap by mouth daily. Indications: ANXIETY WITH DEPRESSION, DEPRESSION    aspirin (ASPIRIN) 325 mg tablet Take 325 mg by mouth daily. Indications: MYOCARDIAL INFARCTION PREVENTION    hydrOXYzine (ATARAX) 50 mg tablet Take 50 mg by mouth every six (6) hours as needed for Anxiety. Indications: ANXIETY    melatonin 3 mg tablet Take 3 mg by mouth nightly. Indications: INSOMNIA    omeprazole (PRILOSEC) 20 mg capsule Take 20 mg by mouth daily. Indications: HEARTBURN    simvastatin (ZOCOR) 40 mg tablet Take 40 mg by mouth nightly. Indications: HYPERCHOLESTEROLEMIA    cholecalciferol, vitamin D3, 2,000 unit tab Take 8,000 Int'l Units by mouth daily. Indications: PREVENTION OF VITAMIN D DEFICIENCY    magnesium oxide (MAG-OX) 400 mg tablet Take 400 mg by mouth daily. Indications: HYPOMAGNESEMIA      PAST MEDICAL HISTORY:  Past Medical History   Diagnosis Date    Anxiety     Asthma     Bipolar affective disorder ([facility] [address]     Chronic back pain     [facility]A (cerebral vascular accident) ([facility] [address]     Diabetes mellitus, type 2 ([facility] [address]     Dyslipidemia     Fibromyalgia     GERD (gastroesophageal reflux disease)     Hypertension     Schizophrenia ([facility] [address]      Past Surgical History   Procedure Laterality Date    Hx knee arthroscopy      Hx bunionectomy      Hx cholecystectomy      Hx tubal ligation      Hx hysterectomy      Hx other surgical       orbit blow out repair.       SOCIAL HISTORY:    Social History     Social History    Marital status: SINGLE     Spouse name: N/A    Number of children: N/A    Years of education: N/A     Occupational History    Not on file. Social History Main Topics    Smoking status: Current Every Day Smoker     Types: Cigarettes    Smokeless tobacco: Not on file    Alcohol use Not on file    Drug use: Yes     Special: Opiates    Sexual activity: Not on file     Other Topics Concern    Not on file     Social History Narrative    Lives in a trailer alone. . On SSDI. [employer]. HS diploma. Has one son. FAMILY HISTORY: History reviewed. No pertinent family history. History reviewed. No pertinent family history. REVIEW OF SYSTEMS:   Psychological ROS: positive for - anxiety and hostility  negative for - suicidal ideation  Pertinent items are noted in the History of Present Illness. All other Systems reviewed and are considered negative. MENTAL STATUS EXAM & VITALS         MENTAL STATUS EXAM (MSE):    MSE FINDINGS ARE WITHIN NORMAL LIMITS (WNL) UNLESS OTHERWISE STATED BELOW. ( ALL OF THE BELOW CATEGORIES OF THE MSE HAVE BEEN REVIEWED (reviewed 1/28/2017) AND UPDATED AS DEEMED APPROPRIATE )  General Presentation age appropriate, disheveled and overweight, uncooperative and unreliable   Orientation disorganized, lethargic, not oriented to situation   Vital Signs  See below (reviewed 1/28/2017); Vital Signs (BP, Pulse, & Temp) are within normal limits if not listed below.    Gait and Station Stable/steady, no ataxia   Musculoskeletal System No extrapyramidal symptoms (EPS); no abnormal muscular movements or Tardive Dyskinesia (TD); muscle strength and tone are within normal limits   Language No aphasia or dysarthria   Speech:  hyperverbal   Thought Processes illogical; fast rate of thoughts; poor abstract reasoning/computation   Thought Associations tangential   Thought Content paranoid delusions and auditory hallucinations   Suicidal Ideations none   Homicidal Ideations none   Mood:  angry, hostile , irritable and labile    Affect: irritable and labile   Memory recent  intact   Memory remote:  intact   Concentration/Attention:  distractable   Fund of Knowledge below average   Insight:  limited   Reliability poor   Judgment:  limited            VITALS:     Patient Vitals for the past 24 hrs:   Temp Pulse Resp BP   01/28/17 1827 98.2 °F (36.8 °C) (!) 101 18 134/77   01/28/17 1252 - (!) 111 18 159/81   01/28/17 0709 97.5 °F (36.4 °C) 81 18 174/90     Wt Readings from Last 3 Encounters:   07/07/16 96.8 kg (213 lb 6.4 oz)     Temp Readings from Last 3 Encounters:   01/28/17 98.2 °F (36.8 °C)   07/08/16 98.2 °F (36.8 °C)     BP Readings from Last 3 Encounters:   01/28/17 134/77   07/08/16 119/60     Pulse Readings from Last 3 Encounters:   01/28/17 (!) 101   07/08/16 94            DATA       LABORATORY DATA:  Labs Reviewed   GLUCOSE, POC - Abnormal; Notable for the following:        Result Value    Glucose (POC) 115 (*)     All other components within normal limits     Admission on 01/27/2017   Component Date Value Ref Range Status    Glucose (POC) 01/28/2017 115* 65 - 100 mg/dL Final    Performed by 01/28/2017 Irasema Reeves   Final        RADIOLOGY REPORTS:  No results found for this or any previous visit. No results found.            MEDICATIONS       ALL MEDICATIONS  Current Facility-Administered Medications   Medication Dose Route Frequency    aspirin (ASPIRIN) tablet 325 mg  325 mg Oral DAILY    metoprolol tartrate (LOPRESSOR) tablet 12.5 mg  12.5 mg Oral Q12H    cholecalciferol (VITAMIN D3) tablet 4,000 Units  4,000 Units Oral DAILY    [START ON 1/29/2017] pantoprazole (PROTONIX) tablet 40 mg  40 mg Oral ACB    atorvastatin (LIPITOR) tablet 40 mg  40 mg Oral QHS    insulin lispro (HUMALOG) injection   SubCUTAneous ACB&D    glucose chewable tablet 16 g  4 Tab Oral PRN    dextrose (D50W) injection syrg 12.5-25 g  12.5-25 g IntraVENous PRN    glucagon (GLUCAGEN) injection 1 mg  1 mg IntraMUSCular PRN    ziprasidone (GEODON) 20 mg in sterile water (preservative free) 1 mL injection  20 mg IntraMUSCular BID PRN    OLANZapine (ZyPREXA) tablet 5 mg  5 mg Oral Q6H PRN    LORazepam (ATIVAN) injection 2 mg  2 mg IntraMUSCular Q4H PRN    LORazepam (ATIVAN) tablet 1 mg  1 mg Oral Q4H PRN    zolpidem (AMBIEN) tablet 10 mg  10 mg Oral QHS PRN    acetaminophen (TYLENOL) tablet 650 mg  650 mg Oral Q4H PRN    ibuprofen (MOTRIN) tablet 400 mg  400 mg Oral Q8H PRN    magnesium hydroxide (MILK OF MAGNESIA) 400 mg/5 mL oral suspension 30 mL  30 mL Oral DAILY PRN    nicotine (NICODERM CQ) 21 mg/24 hr patch 1 Patch  1 Patch TransDERmal DAILY PRN    risperiDONE (RisperDAL) tablet 1 mg  1 mg Oral BID    Or    haloperidol lactate (HALDOL) injection 2 mg  2 mg IntraMUSCular BID    haloperidol lactate (HALDOL) injection 1 mg++Court-ordered med for refusal of Depakote++  1 mg IntraMUSCular BID    Or    divalproex ER (DEPAKOTE ER) 24 hour tablet 750 mg  750 mg Oral BID      SCHEDULED MEDICATIONS  Current Facility-Administered Medications   Medication Dose Route Frequency    aspirin (ASPIRIN) tablet 325 mg  325 mg Oral DAILY    metoprolol tartrate (LOPRESSOR) tablet 12.5 mg  12.5 mg Oral Q12H    cholecalciferol (VITAMIN D3) tablet 4,000 Units  4,000 Units Oral DAILY    [START ON 1/29/2017] pantoprazole (PROTONIX) tablet 40 mg  40 mg Oral ACB    atorvastatin (LIPITOR) tablet 40 mg  40 mg Oral QHS    insulin lispro (HUMALOG) injection   SubCUTAneous ACB&D    risperiDONE (RisperDAL) tablet 1 mg  1 mg Oral BID    Or    haloperidol lactate (HALDOL) injection 2 mg  2 mg IntraMUSCular BID    haloperidol lactate (HALDOL) injection 1 mg++Court-ordered med for refusal of Depakote++  1 mg IntraMUSCular BID    Or    divalproex ER (DEPAKOTE ER) 24 hour tablet 750 mg  750 mg Oral BID                ASSESSMENT & PLAN        The patient Shirin Mahajan is a 49 y.o.  female who presents at this time for treatment of the following diagnoses:  Patient C/Gregory Merritt [id number] Problem List:   Bipolar affective, mixed, sev w/ psych vs schizoaffective dis vs schizophrenia    Assessment: wide differential. Need more collateral info    Plan: start mood stabilizer and AP along with benzos. I will monitor blood levels (Depakote---a drug with a narrow therapeutic index= NTI) and associated labs for drug therapy implemented that require intense monitoring for toxicity as deemed appropriate base on current medication side effects and pharmacodynamically determined drug 1/2 lives. Will get a med petition. Essential hypertension (7/6/2016)    Assessment: elevated    Plan: restart bp meds    Catatonic state ([facility] [address] (1/27/2017)    Assessment: related to underlying psychotic/mood dis, now resolved    Plan: tx underlying psych etiology. Monitor for malignant catatonia, dc APs if vitals escalate          In summary, Tayo Diaz presents with a severe exacerbation of the principal diagnosis, Bipolar affective, mixed, sev w/ psych [facility])    While on the unit Tayo Diaz will be provided with individual, milieu, occupational, group, and substance abuse therapies to address target symptoms as deemed appropriate for the individual patient. I agree with decision to admit patient. I have spoken to [facility] psychiatric /ED staff regarding the nature of patients's admission at this time. A coordinated, multidisplinary treatment team (includes the nurse, unit pharmcist,  and writer) round was conducted for this initial evaluation with the patient present. The following regarding medications was addressed during rounds with patient:   the risks and benefits of the proposed medication. The patient was given the opportunity to ask questions. Informed consent given to the use of the above medications.      I will continue to adjust psychiatric and non-psychiatric medications (see above \"medication\" section and orders section for details) as deemed appropriate & based upon diagnoses and response to treatment. I have reviewed admission (and previous/old) labs and medical tests in the EHR and or transferring hospital documents. I will continue to order blood tests/labs and diagnostic tests as deemed appropriate and review results as they become available (see orders for details). I have reviewed old psychiatric and medical records available in the EHR. I Will order additional psychiatric records from other institutions to further elucidate the nature of patient's psychopathology and review once available. I will gather additional collateral information from friends, family and o/p treatment team to further elucidate the nature of patient's psychopathology and baselline level of psychiatric functioning.         ESTIMATED LENGTH OF STAY:   9 days       STRENGTHS:  Exercising self-direction/Resourceful and Access to housing/residential stability                      SIGNED:    Jason Aiken MD  1/28/2017

## 2017-01-29 NOTE — PROGRESS NOTES
Problem: Altered Thought Process (Adult/Pediatric)  Goal: *STG: Remains safe in hospital  Outcome: Progressing Towards Goal  Pt has been resting quietly in bed with eyes closed,  no signs of any distress during this shift. Pt will continue to be monitored Q 15 minute for safety and needs. Pt slept about 7 hours.

## 2017-01-29 NOTE — BH NOTES
GROUP THERAPY PROGRESS NOTE    Jose Luis Cabello is participating in Positive thoughts.      Group time: 30 minutes    Goal orientation: relaxation    Group therapy participation: passive

## 2017-01-29 NOTE — BH NOTES
GROUP THERAPY PROGRESS NOTE    The patient Daune Letters is participating in Community Group. Group time: 30 minutes    Personal goal for participation: to orient the patient to the unit.     Goal orientation: successful adoption of unit rules    Group therapy participation: minimal    Therapeutic interventions reviewed and discussed: Yes    Impression of participation:     López Erazo  1/29/2017 9:03 AM

## 2017-01-30 LAB
CHOLEST SERPL-MCNC: 122 MG/DL
GLUCOSE BLD STRIP.AUTO-MCNC: 100 MG/DL (ref 65–100)
GLUCOSE BLD STRIP.AUTO-MCNC: 127 MG/DL (ref 65–100)
GLUCOSE P FAST SERPL-MCNC: 97 MG/DL (ref 65–100)
HDLC SERPL-MCNC: 42 MG/DL
HDLC SERPL: 2.9 {RATIO} (ref 0–5)
LDLC SERPL CALC-MCNC: 58.6 MG/DL (ref 0–100)
LIPID PROFILE,FLP: NORMAL
SERVICE CMNT-IMP: ABNORMAL
SERVICE CMNT-IMP: NORMAL
TRIGL SERPL-MCNC: 107 MG/DL (ref ?–150)
TSH SERPL DL<=0.05 MIU/L-ACNC: 0.7 UIU/ML (ref 0.36–3.74)
VLDLC SERPL CALC-MCNC: 21.4 MG/DL

## 2017-01-30 PROCEDURE — 82962 GLUCOSE BLOOD TEST: CPT | Performed by: PSYCHIATRY & NEUROLOGY

## 2017-01-30 PROCEDURE — 74011250637 HC RX REV CODE- 250/637: Performed by: INTERNAL MEDICINE

## 2017-01-30 PROCEDURE — 82962 GLUCOSE BLOOD TEST: CPT

## 2017-01-30 PROCEDURE — 84443 ASSAY THYROID STIM HORMONE: CPT | Performed by: PSYCHIATRY & NEUROLOGY

## 2017-01-30 PROCEDURE — 36415 COLL VENOUS BLD VENIPUNCTURE: CPT | Performed by: PSYCHIATRY & NEUROLOGY

## 2017-01-30 PROCEDURE — 65220000003 HC RM SEMIPRIVATE PSYCH

## 2017-01-30 PROCEDURE — 80061 LIPID PANEL: CPT | Performed by: PSYCHIATRY & NEUROLOGY

## 2017-01-30 PROCEDURE — 74011250637 HC RX REV CODE- 250/637: Performed by: PSYCHIATRY & NEUROLOGY

## 2017-01-30 PROCEDURE — 82947 ASSAY GLUCOSE BLOOD QUANT: CPT | Performed by: PSYCHIATRY & NEUROLOGY

## 2017-01-30 RX ORDER — METOPROLOL TARTRATE 25 MG/1
25 TABLET, FILM COATED ORAL EVERY 12 HOURS
Status: DISCONTINUED | OUTPATIENT
Start: 2017-01-30 | End: 2017-02-10 | Stop reason: HOSPADM

## 2017-01-30 RX ORDER — HALOPERIDOL 5 MG/ML
2 INJECTION INTRAMUSCULAR 2 TIMES DAILY
Status: DISCONTINUED | OUTPATIENT
Start: 2017-01-30 | End: 2017-01-31

## 2017-01-30 RX ORDER — RISPERIDONE 1 MG/1
2 TABLET, FILM COATED ORAL 2 TIMES DAILY
Status: DISCONTINUED | OUTPATIENT
Start: 2017-01-30 | End: 2017-01-31

## 2017-01-30 RX ADMIN — ASPIRIN 325 MG ORAL TABLET 325 MG: 325 PILL ORAL at 08:13

## 2017-01-30 RX ADMIN — ATORVASTATIN CALCIUM 40 MG: 40 TABLET, FILM COATED ORAL at 21:20

## 2017-01-30 RX ADMIN — DIVALPROEX SODIUM 750 MG: 500 TABLET, FILM COATED, EXTENDED RELEASE ORAL at 08:13

## 2017-01-30 RX ADMIN — VITAMIN D, TAB 1000IU (100/BT) 4000 UNITS: 25 TAB at 08:13

## 2017-01-30 RX ADMIN — METOPROLOL TARTRATE 12.5 MG: 25 TABLET, FILM COATED ORAL at 08:13

## 2017-01-30 RX ADMIN — RISPERIDONE 2 MG: 1 TABLET ORAL at 16:41

## 2017-01-30 RX ADMIN — RISPERIDONE 1 MG: 1 TABLET ORAL at 08:13

## 2017-01-30 RX ADMIN — DIVALPROEX SODIUM 750 MG: 500 TABLET, FILM COATED, EXTENDED RELEASE ORAL at 16:41

## 2017-01-30 RX ADMIN — METOPROLOL TARTRATE 25 MG: 25 TABLET ORAL at 21:20

## 2017-01-30 RX ADMIN — PANTOPRAZOLE SODIUM 40 MG: 40 TABLET, DELAYED RELEASE ORAL at 06:18

## 2017-01-30 NOTE — PROGRESS NOTES
Problem: Altered Thought Process (Adult/Pediatric)  Goal: *STG: Remains safe in hospital  Outcome: Progressing Towards Goal  Pt alert to self. Appears to be preoccupied. No behavior management issues. Denies SI/HI. Compliant with meals and medications. Will continue to monitor pt and assess needs.

## 2017-01-30 NOTE — BH NOTES
GROUP THERAPY PROGRESS NOTE    Louise Paniagua is participating in Reflections.      Group time: 30 minutes    Personal goal for participation: to review daily goal    Goal orientation: personal    Group therapy participation: active    Therapeutic interventions reviewed and discussed: yes    Impression of participation: good

## 2017-01-30 NOTE — PROGRESS NOTES
General Daily Progress Note    Admit Date: 1/27/2017    Subjective:     Asked to see for elevated BP.       Current Facility-Administered Medications   Medication Dose Route Frequency    cloNIDine HCl (CATAPRES) tablet 0.2 mg  0.2 mg Oral Q6H PRN    aspirin (ASPIRIN) tablet 325 mg  325 mg Oral DAILY    metoprolol tartrate (LOPRESSOR) tablet 12.5 mg  12.5 mg Oral Q12H    cholecalciferol (VITAMIN D3) tablet 4,000 Units  4,000 Units Oral DAILY    pantoprazole (PROTONIX) tablet 40 mg  40 mg Oral ACB    atorvastatin (LIPITOR) tablet 40 mg  40 mg Oral QHS    insulin lispro (HUMALOG) injection   SubCUTAneous ACB&D    glucose chewable tablet 16 g  4 Tab Oral PRN    dextrose (D50W) injection syrg 12.5-25 g  12.5-25 g IntraVENous PRN    glucagon (GLUCAGEN) injection 1 mg  1 mg IntraMUSCular PRN    ziprasidone (GEODON) 20 mg in sterile water (preservative free) 1 mL injection  20 mg IntraMUSCular BID PRN    OLANZapine (ZyPREXA) tablet 5 mg  5 mg Oral Q6H PRN    LORazepam (ATIVAN) injection 2 mg  2 mg IntraMUSCular Q4H PRN    LORazepam (ATIVAN) tablet 1 mg  1 mg Oral Q4H PRN    zolpidem (AMBIEN) tablet 10 mg  10 mg Oral QHS PRN    acetaminophen (TYLENOL) tablet 650 mg  650 mg Oral Q4H PRN    ibuprofen (MOTRIN) tablet 400 mg  400 mg Oral Q8H PRN    magnesium hydroxide (MILK OF MAGNESIA) 400 mg/5 mL oral suspension 30 mL  30 mL Oral DAILY PRN    nicotine (NICODERM CQ) 21 mg/24 hr patch 1 Patch  1 Patch TransDERmal DAILY PRN    risperiDONE (RisperDAL) tablet 1 mg  1 mg Oral BID    Or    haloperidol lactate (HALDOL) injection 2 mg  2 mg IntraMUSCular BID    haloperidol lactate (HALDOL) injection 1 mg++Court-ordered med for refusal of Depakote++  1 mg IntraMUSCular BID    Or    divalproex ER (DEPAKOTE ER) 24 hour tablet 750 mg  750 mg Oral BID            Objective:     Patient Vitals for the past 8 hrs:   BP Pulse Resp   01/29/17 1830 158/90 - -   01/29/17 1606 161/81 93 16             Physical Exam:   Visit Vitals    /90    Pulse 93    Temp 98 °F (36.7 °C)    Resp 16    Breastfeeding No     General:  Alert, cooperative, no distress, appears stated age. Head:  Normocephalic, without obvious abnormality, atraumatic. Eyes:  Conjunctivae/corneas clear. PERRL, EOMs intact. Lungs:   Clear to auscultation bilaterally. Chest wall:  No tenderness or deformity. Heart:  Regular rate and rhythm, S1, S2 normal, no murmur, click, rub or gallop. Abdomen:   Soft, non-tender. Bowel sounds normal. No masses,  No organomegaly. Extremities: Extremities normal, atraumatic, no cyanosis or edema. Pulses: 2+ and symmetric all extremities. Skin: Skin color, texture, turgor normal. No rashes or lesions               Recent Results (from the past 24 hour(s))   GLUCOSE, POC    Collection Time: 01/29/17  4:55 PM   Result Value Ref Range    Glucose (POC) 104 (H) 65 - 100 mg/dL    Performed by Aj Nelson          Assessment:     Principal Problem:    Bipolar affective, mixed, sev w/ psych ([facility] [address] (1/27/2017)    Active Problems:    Essential hypertension (7/6/2016)      Schizoaffective disorder, bipolar type ([address] (1/27/2017)      Catatonic state ([facility] [address] (1/27/2017)    Plan: Add Clonidine/Increase Metoprolol.

## 2017-01-30 NOTE — PROGRESS NOTES
Problem: Altered Thought Process (Adult/Pediatric)  Goal: *STG: Complies with medication therapy  Outcome: Progressing Towards Goal  Pt has been isolated to room most of the time during the shift. Pt was food and med compliant, Pt was appropriate with staffs. Pt denied any S/I and H/I at this time. Staff will also continue to monitor pt with Q 15 checks for safety.

## 2017-01-30 NOTE — BH NOTES
PSYCHIATRIC PROGRESS NOTE         IDENTIFICATION:    Patient Name  Jose Luis Cabello   Date of Birth 1960   [facility] [id number]   Medical Record Number  [id number]      Age  49 y.o. PCP None   Admit date:  1/27/2017    Room Number  815/16  @ [facility]   Date of Service  1/29/2017            HISTORY         REASON FOR HOSPITALIZATION:  CC: \"catatonic and psychotic\". Pt admitted under a temporary M[facility] order (TDO) for severe psychosis and raeann proving to be/posing an imminent danger to self and others and an inability to care for self. HISTORY OF PRESENT ILLNESS:    The patient, Jose Luis Cabello, is a 49 y.o. WHITE OR   WHITE OR  female with a past psychiatric history significant for bipolar vs schizophrenia, who presents at this time with complaints of (and/or evidence of) the following emotional symptoms: psychotic behavior, paranoid behavior, raeann, psychosis and catatonia. Additional symptomatology include loud, hyperverbal, combative, demanding, entitled, anger outbursts, difficulty sleeping and hearing voices. The above symptoms have been present for a week. These symptoms are of severe severity. These symptoms are constant in nature. The patient's condition has been precipitated by psychosocial stressors. Patient's condition made worse by treatment noncompliance. UDS +negative; BAL=0. Pt given IM psych meds on adm due to severe combativeness. 1/28-Loud and irritable, refused labs, sarcastic. 1/29- Much pleasant today. Requiring re-approach and much encouragement before accepting psych scheduled meds.    ALLERGIES:  Allergies   Allergen Reactions    Augmentin [Amoxicillin-Pot Clavulanate] Rash    Codeine Nausea Only    Cogentin [Benztropine] Other (comments)     Makes her sick    Iodine Other (comments)    Macrodantin [Nitrofurantoin Macrocrystalline] Rash    Morphine Shortness of Breath    Mucinex [Guaifenesin] Shortness of Breath    Oxycodone Other (comments)    Prednisone Anxiety      MEDICATIONS PRIOR TO ADMISSION:  Prescriptions Prior to Admission   Medication Sig    metoprolol tartrate (LOPRESSOR) 25 mg tablet Take 0.5 Tabs by mouth every twelve (12) hours. Indications: HYPERTENSION    risperiDONE (RISPERDAL) 2 mg tablet Take 1 Tab by mouth two (2) times a day. Indications: BIPOLAR DISORDER IN REMISSION, SCHIZOPHRENIA    DULoxetine (CYMBALTA) 30 mg capsule Take 1 Cap by mouth nightly. Indications: ANXIETY WITH DEPRESSION, DEPRESSION    DULoxetine (CYMBALTA) 60 mg capsule Take 1 Cap by mouth daily. Indications: ANXIETY WITH DEPRESSION, DEPRESSION    aspirin (ASPIRIN) 325 mg tablet Take 325 mg by mouth daily. Indications: MYOCARDIAL INFARCTION PREVENTION    hydrOXYzine (ATARAX) 50 mg tablet Take 50 mg by mouth every six (6) hours as needed for Anxiety. Indications: ANXIETY    melatonin 3 mg tablet Take 3 mg by mouth nightly. Indications: INSOMNIA    omeprazole (PRILOSEC) 20 mg capsule Take 20 mg by mouth daily. Indications: HEARTBURN    simvastatin (ZOCOR) 40 mg tablet Take 40 mg by mouth nightly. Indications: HYPERCHOLESTEROLEMIA    cholecalciferol, vitamin D3, 2,000 unit tab Take 8,000 Int'l Units by mouth daily. Indications: PREVENTION OF VITAMIN D DEFICIENCY    magnesium oxide (MAG-OX) 400 mg tablet Take 400 mg by mouth daily. Indications: HYPOMAGNESEMIA      PAST MEDICAL HISTORY:  Past Medical History   Diagnosis Date    Anxiety     Asthma     Bipolar affective disorder ([facility]     Chronic back pain     CVA (cerebral vascular accident) ([facility]     Diabetes mellitus, type 2 ([facility]     Dyslipidemia     Fibromyalgia     GERD (gastroesophageal reflux disease)     Hypertension     Schizophrenia ([facility] [facility]      Past Surgical History   Procedure Laterality Date    Hx knee arthroscopy      Hx bunionectomy      Hx cholecystectomy      Hx tubal ligation      Hx hysterectomy      Hx other surgical       orbit blow out repair.       SOCIAL HISTORY:    Social History     Social History    Marital status: SINGLE     Spouse name: N/A    Number of children: N/A    Years of education: N/A     Occupational History    Not on file. Social History Main Topics    Smoking status: Current Every Day Smoker     Types: Cigarettes    Smokeless tobacco: Not on file    Alcohol use Not on file    Drug use: Yes     Special: Opiates    Sexual activity: Not on file     Other Topics Concern    Not on file     Social History Narrative    Lives in a trailer alone. . On SSDI. [employer]. HS diploma. Has one son. FAMILY HISTORY: History reviewed. No pertinent family history. History reviewed. No pertinent family history. REVIEW OF SYSTEMS:   Psychological ROS: positive for - anxiety and hostility  negative for - suicidal ideation  Pertinent items are noted in the History of Present Illness. All other Systems reviewed and are considered negative. MENTAL STATUS EXAM & VITALS         MENTAL STATUS EXAM (MSE):    MSE FINDINGS ARE WITHIN NORMAL LIMITS (WNL) UNLESS OTHERWISE STATED BELOW. ( ALL OF THE BELOW CATEGORIES OF THE MSE HAVE BEEN REVIEWED (reviewed 1/29/2017) AND UPDATED AS DEEMED APPROPRIATE )  General Presentation age appropriate, disheveled and overweight, uncooperative and unreliable   Orientation disorganized, lethargic, not oriented to situation   Vital Signs  See below (reviewed 1/29/2017); Vital Signs (BP, Pulse, & Temp) are within normal limits if not listed below.    Gait and Station Stable/steady, no ataxia   Musculoskeletal System No extrapyramidal symptoms (EPS); no abnormal muscular movements or Tardive Dyskinesia (TD); muscle strength and tone are within normal limits   Language No aphasia or dysarthria   Speech:  hyperverbal   Thought Processes illogical; fast rate of thoughts; poor abstract reasoning/computation   Thought Associations tangential   Thought Content paranoid delusions and auditory hallucinations Suicidal Ideations none   Homicidal Ideations none   Mood:  angry, hostile , irritable and labile    Affect:  irritable and labile   Memory recent  intact   Memory remote:  intact   Concentration/Attention:  distractable   Fund of Knowledge below average   Insight:  limited   Reliability poor   Judgment:  limited            VITALS:     Patient Vitals for the past 24 hrs:   Temp Pulse Resp BP   01/29/17 1830 - - - 158/90   01/29/17 1606 - 93 16 161/81   01/29/17 1230 98 °F (36.7 °C) 84 18 160/86   01/29/17 0942 - (!) 101 - (!) 130/106   01/29/17 0544 98 °F (36.7 °C) 85 16 182/90     Wt Readings from Last 3 Encounters:   07/07/16 96.8 kg (213 lb 6.4 oz)     Temp Readings from Last 3 Encounters:   01/29/17 98 °F (36.7 °C)   07/08/16 98.2 °F (36.8 °C)     BP Readings from Last 3 Encounters:   01/29/17 158/90   07/08/16 119/60     Pulse Readings from Last 3 Encounters:   01/29/17 93   07/08/16 94            DATA       LABORATORY DATA:  Labs Reviewed   GLUCOSE, POC - Abnormal; Notable for the following:        Result Value    Glucose (POC) 115 (*)     All other components within normal limits   GLUCOSE, POC - Abnormal; Notable for the following:     Glucose (POC) 104 (*)     All other components within normal limits     Admission on 01/27/2017   Component Date Value Ref Range Status    Glucose (POC) 01/28/2017 115* 65 - 100 mg/dL Final    Performed by 01/28/2017 Marcello Bautista   Final    Glucose (POC) 01/29/2017 104* 65 - 100 mg/dL Final    Performed by 01/29/2017 Parish Noee   Final        RADIOLOGY REPORTS:  No results found for this or any previous visit. No results found.            MEDICATIONS       ALL MEDICATIONS  Current Facility-Administered Medications   Medication Dose Route Frequency    cloNIDine HCl (CATAPRES) tablet 0.2 mg  0.2 mg Oral Q6H    aspirin (ASPIRIN) tablet 325 mg  325 mg Oral DAILY    metoprolol tartrate (LOPRESSOR) tablet 12.5 mg  12.5 mg Oral Q12H    cholecalciferol (VITAMIN D3) tablet 4,000 Units  4,000 Units Oral DAILY    pantoprazole (PROTONIX) tablet 40 mg  40 mg Oral ACB    atorvastatin (LIPITOR) tablet 40 mg  40 mg Oral QHS    insulin lispro (HUMALOG) injection   SubCUTAneous ACB&D    glucose chewable tablet 16 g  4 Tab Oral PRN    dextrose (D50W) injection syrg 12.5-25 g  12.5-25 g IntraVENous PRN    glucagon (GLUCAGEN) injection 1 mg  1 mg IntraMUSCular PRN    ziprasidone (GEODON) 20 mg in sterile water (preservative free) 1 mL injection  20 mg IntraMUSCular BID PRN    OLANZapine (ZyPREXA) tablet 5 mg  5 mg Oral Q6H PRN    LORazepam (ATIVAN) injection 2 mg  2 mg IntraMUSCular Q4H PRN    LORazepam (ATIVAN) tablet 1 mg  1 mg Oral Q4H PRN    zolpidem (AMBIEN) tablet 10 mg  10 mg Oral QHS PRN    acetaminophen (TYLENOL) tablet 650 mg  650 mg Oral Q4H PRN    ibuprofen (MOTRIN) tablet 400 mg  400 mg Oral Q8H PRN    magnesium hydroxide (MILK OF MAGNESIA) 400 mg/5 mL oral suspension 30 mL  30 mL Oral DAILY PRN    nicotine (NICODERM CQ) 21 mg/24 hr patch 1 Patch  1 Patch TransDERmal DAILY PRN    risperiDONE (RisperDAL) tablet 1 mg  1 mg Oral BID    Or    haloperidol lactate (HALDOL) injection 2 mg  2 mg IntraMUSCular BID    haloperidol lactate (HALDOL) injection 1 mg++Court-ordered med for refusal of Depakote++  1 mg IntraMUSCular BID    Or    divalproex ER (DEPAKOTE ER) 24 hour tablet 750 mg  750 mg Oral BID      SCHEDULED MEDICATIONS  Current Facility-Administered Medications   Medication Dose Route Frequency    cloNIDine HCl (CATAPRES) tablet 0.2 mg  0.2 mg Oral Q6H    aspirin (ASPIRIN) tablet 325 mg  325 mg Oral DAILY    metoprolol tartrate (LOPRESSOR) tablet 12.5 mg  12.5 mg Oral Q12H    cholecalciferol (VITAMIN D3) tablet 4,000 Units  4,000 Units Oral DAILY    pantoprazole (PROTONIX) tablet 40 mg  40 mg Oral ACB    atorvastatin (LIPITOR) tablet 40 mg  40 mg Oral QHS    insulin lispro (HUMALOG) injection   SubCUTAneous ACB&D    risperiDONE (RisperDAL) tablet 1 mg  1 mg Oral BID    Or    haloperidol lactate (HALDOL) injection 2 mg  2 mg IntraMUSCular BID    haloperidol lactate (HALDOL) injection 1 mg++Court-ordered med for refusal of Depakote++  1 mg IntraMUSCular BID    Or    divalproex ER (DEPAKOTE ER) 24 hour tablet 750 mg  750 mg Oral BID                ASSESSMENT & PLAN        The patient Cornelio Fofana is a 49 y.o.  female who presents at this time for treatment of the following diagnoses:  Patient Active Hospital Problem List:   Bipolar affective, mixed, sev w/ psych vs schizoaffective dis vs schizophrenia    Assessment: wide differential. Need more collateral info    Plan: start mood stabilizer and AP along with benzos. I will monitor blood levels (Depakote---a drug with a narrow therapeutic index= NTI) and associated labs for drug therapy implemented that require intense monitoring for toxicity as deemed appropriate base on current medication side effects and pharmacodynamically determined drug 1/2 lives. Will get a med petition. Essential hypertension (7/6/2016)    Assessment: elevated    Plan: restart bp meds    Catatonic state ([facility] [address] (1/27/2017)    Assessment: related to underlying psychotic/mood dis, now resolved    Plan: tx underlying psych etiology. Monitor for malignant catatonia, dc APs if vitals escalate          In summary, Cornelio Fofana presents with a severe exacerbation of the principal diagnosis, Bipolar affective, mixed, sev w/ psych [facility])    While on the unit Cornelio Fofana will be provided with individual, milieu, occupational, group, and substance abuse therapies to address target symptoms as deemed appropriate for the individual patient. I agree with decision to admit patient. I have spoken to [facility] psychiatric /ED staff regarding the nature of patients's admission at this time.     A coordinated, multidisplinary treatment team (includes the nurse, unit pharmcist,  and writer) round was conducted for this initial evaluation with the patient present. The following regarding medications was addressed during rounds with patient:   the risks and benefits of the proposed medication. The patient was given the opportunity to ask questions. Informed consent given to the use of the above medications. I will continue to adjust psychiatric and non-psychiatric medications (see above \"medication\" section and orders section for details) as deemed appropriate & based upon diagnoses and response to treatment. I have reviewed admission (and previous/old) labs and medical tests in the EHR and or transferring hospital documents. I will continue to order blood tests/labs and diagnostic tests as deemed appropriate and review results as they become available (see orders for details). I have reviewed old psychiatric and medical records available in the EHR. I Will order additional psychiatric records from other institutions to further elucidate the nature of patient's psychopathology and review once available. I will gather additional collateral information from friends, family and o/p treatment team to further elucidate the nature of patient's psychopathology and baselline level of psychiatric functioning.         ESTIMATED LENGTH OF STAY:   9 days       STRENGTHS:  Exercising self-direction/Resourceful and Access to housing/residential stability                      SIGNED:    Jennie Mayorga MD  1/29/2017

## 2017-01-30 NOTE — PROGRESS NOTES
Problem: Altered Thought Process (Adult/Pediatric)  Goal: *STG: Remains safe in hospital  Outcome: Progressing Towards Goal  Pt received Ambien at 2345 for poor sleep. Pt has been resting quietly in bed with eyes closed,  no signs of any distress during this shift. Pt will continue to be monitored Q 15 minute for safety and needs. Pt slept about 6 hours. Labs obtained.

## 2017-01-30 NOTE — BH NOTES
GROUP THERAPY PROGRESS NOTE    The patient Cornelio montes 49 y.o. female is participating in Coping Skills Group. Group time: 45 minutes    Personal goal for participation:  To identify people and things most grateful for    Goal orientation:  personal    Group therapy participation: minimal    Therapeutic interventions reviewed and discussed: worksheet    Impression of participation:  The patient was present-arrived late    Audelia Pritchett  1/30/2017  5:07 PM

## 2017-01-30 NOTE — PROGRESS NOTES
Laboratory monitoring for mood stabilizer and antipsychotics:    Recommended baseline monitoring has been completed for this patient. The following labs were completed at transferring facility on 1/26/17:  sodium 142, potassium 3.2 (low), BN 8, creatinine 0.9, glucose 127, calcium 9.2, total bili 0.3, AST 46, ALT 88, alk phos 70, albumin 3.4, TSH 0.666, WBC 8.7, Hgb 13.8, Hct 40.4, Plts 463 (elevated), UDS negative, UA completed       The patient is currently taking the following medication(s):   Current Facility-Administered Medications   Medication Dose Route Frequency    valproic acid (as sodium salt) (DEPAKENE) 250 mg/5 mL (5 mL) oral solution 500 mg  500 mg Oral TID    Or    haloperidol lactate (HALDOL) injection 2 mg +++COURT ORDERED MEDICATION FOR REFUSAL OF PO VALPROIC ACID+++  2 mg IntraMUSCular TID    risperiDONE (RisperDAL) 1 mg/mL oral solution soln 1 mg  1 mg Oral TID    Or    haloperidol lactate (HALDOL) injection 1 mg +++COURT ORDERED MEDICATION FOR REFUSAL OF PO RISPERIDONE+++  1 mg IntraMUSCular TID    clonazePAM (KlonoPIN) tablet 1 mg  1 mg Oral TID    metoprolol tartrate (LOPRESSOR) tablet 25 mg  25 mg Oral Q12H    aspirin (ASPIRIN) tablet 325 mg  325 mg Oral DAILY    cholecalciferol (VITAMIN D3) tablet 4,000 Units  4,000 Units Oral DAILY    pantoprazole (PROTONIX) tablet 40 mg  40 mg Oral ACB    atorvastatin (LIPITOR) tablet 40 mg  40 mg Oral QHS    insulin lispro (HUMALOG) injection   SubCUTAneous ACB&D       Serum Drug Level(s)  VALPROIC ACID  Recent Labs      02/02/17   0455   VALP  3*       Height, Weight, BMI Estimation  Estimated body mass index is 36.97 kg/(m^2) as calculated from the following:    Height as of this encounter: 157.5 cm (62\"). Weight as of this encounter: 91.7 kg (202 lb 2 oz).      Renal Function, Hepatic Function and Chemistry  See above    Lab Results   Component Value Date/Time    Glucose 97 01/30/2017 05:00 AM       Lab Results   Component Value Date/Time    Glucose 97 01/30/2017 05:00 AM    Glucose (POC) 110 02/01/2017 04:37 PM       Lab Results   Component Value Date/Time    Hemoglobin A1c 5.5 07/06/2016 04:31 AM       Hematology  See above    Lipids  Lab Results   Component Value Date/Time    Cholesterol, total 122 01/30/2017 05:00 AM    HDL Cholesterol 42 01/30/2017 05:00 AM    LDL, calculated 58.6 01/30/2017 05:00 AM    Triglyceride 107 01/30/2017 05:00 AM    CHOL/HDL Ratio 2.9 01/30/2017 05:00 AM       Thyroid Function    Lab Results   Component Value Date/Time    TSH 0.70 01/30/2017 05:00 AM     Vitals  Visit Vitals    /78    Pulse 72    Temp 97.2 °F (36.2 °C)    Resp 16    Ht 157.5 cm (62\")    Wt 91.7 kg (202 lb 2 oz)    Breastfeeding No    BMI 36.97 kg/m2     Kelly Sims, PharmD, BCPS  [phone] (pharmacy)

## 2017-01-30 NOTE — BH NOTES
PSYCHIATRIC PROGRESS NOTE         Patient Name  Delvis Pruett   Date of Birth 1960   [facility] [id number]   Medical Record Number  [id number]      Age  49 y.o. PCP None   Admit date:  1/27/2017    Room Number  289/01  @ [facility]   Date of Service  1/30/2017          PSYCHOTHERAPY SESSION NOTE:  Length of psychotherapy session: 20 minutes    Main condition/diagnosis/issues treated during session today, 1/30/2017 : raeann, tx conpliance    I employed Cognitive Behavioral therapy techniques, Reality-Oriented psychotherapy, as well as supportive psychotherapy in regards to various ongoing psychosocial stressors, including the following: pre-admission and current problems; housing issues; occupational issues; medical issues; and stress of hospitalization. Interpersonal relationship issues and psychodynamic conflicts explored. Attempts made to alleviate maladaptive patterns. Overall, patient is minially progressing    Treatment Plan Update (reviewed an updated 1/30/2017) : I will modify psychotherapy tx plan by implementing more stress management strategies, building upon cognitive behavioral techniques, increasing coping skills, as well as shoring up psychological defenses). An extended energy and skill set was needed to engage pt in psychotherapy due to some of the following: resistiveness, complexity, negativity, confrontational nature, hostile behaviors, and/or severe abnormalities in thought processes/psychosis resulting in the loss of expressive/receptive language communication skills. E & M PROGRESS NOTE:         HISTORY       CC:  \"so they say\"  HISTORY OF PRESENT ILLNESS/INTERVAL HISTORY:  (reviewed/updated 1/30/2017). per initial evaluation: The patient, Delvis Pruett, is a 49 y.o.  WHITE OR   WHITE OR  female with a past psychiatric history significant for bipolar vs schizophrenia, who presents at this time with complaints of (and/or evidence of) the following emotional symptoms: psychotic behavior, paranoid behavior, raeann, psychosis and catatonia. Additional symptomatology include loud, hyperverbal, combative, demanding, entitled, anger outbursts, difficulty sleeping and hearing voices. The above symptoms have been present for a week. These symptoms are of severe severity. These symptoms are constant in nature. The patient's condition has been precipitated by psychosocial stressors. Patient's condition made worse by treatment noncompliance. UDS +negative; BAL=0. Pt given IM psych meds on adm due to severe combativeness. Jose Luis Cabello presents/reports/evidences the following emotional symptoms today, 1/30/2017:raeann and psychosis. The above symptoms have been present for weeks. These symptoms are of severe severity. The symptoms are constant in nature. Additional symptomatology and features include blocking, labile moods. SIDE EFFECTS: (reviewed/updated 1/30/2017)  None reported or admitted to. No noted toxicity with use of Depakote   ALLERGIES:(reviewed/updated 1/30/2017)  Allergies   Allergen Reactions    Augmentin [Amoxicillin-Pot Clavulanate] Rash    Codeine Nausea Only    Cogentin [Benztropine] Other (comments)     Makes her sick    Iodine Other (comments)    Macrodantin [Nitrofurantoin Macrocrystalline] Rash    Morphine Shortness of Breath    Mucinex [Guaifenesin] Shortness of Breath    Oxycodone Other (comments)    Prednisone Anxiety      MEDICATIONS PRIOR TO ADMISSION:(reviewed/updated 1/30/2017)  Prescriptions Prior to Admission   Medication Sig    metoprolol tartrate (LOPRESSOR) 25 mg tablet Take 0.5 Tabs by mouth every twelve (12) hours. Indications: HYPERTENSION    risperiDONE (RISPERDAL) 2 mg tablet Take 1 Tab by mouth two (2) times a day. Indications: BIPOLAR DISORDER IN REMISSION, SCHIZOPHRENIA    DULoxetine (CYMBALTA) 30 mg capsule Take 1 Cap by mouth nightly.  Indications: ANXIETY WITH DEPRESSION, DEPRESSION    DULoxetine (CYMBALTA) 60 mg capsule Take 1 Cap by mouth daily. Indications: ANXIETY WITH DEPRESSION, DEPRESSION    aspirin (ASPIRIN) 325 mg tablet Take 325 mg by mouth daily. Indications: MYOCARDIAL INFARCTION PREVENTION    hydrOXYzine (ATARAX) 50 mg tablet Take 50 mg by mouth every six (6) hours as needed for Anxiety. Indications: ANXIETY    melatonin 3 mg tablet Take 3 mg by mouth nightly. Indications: INSOMNIA    omeprazole (PRILOSEC) 20 mg capsule Take 20 mg by mouth daily. Indications: HEARTBURN    simvastatin (ZOCOR) 40 mg tablet Take 40 mg by mouth nightly. Indications: HYPERCHOLESTEROLEMIA    cholecalciferol, vitamin D3, 2,000 unit tab Take 8,000 Int'l Units by mouth daily. Indications: PREVENTION OF VITAMIN D DEFICIENCY    magnesium oxide (MAG-OX) 400 mg tablet Take 400 mg by mouth daily. Indications: HYPOMAGNESEMIA      PAST MEDICAL HISTORY: Past medical history from the initial psychiatric evaluation has been reviewed (reviewed/updated 1/30/2017) with no additional updates (I asked patient and no additional past medical history provided). Past Medical History   Diagnosis Date    Anxiety     Asthma     Bipolar affective disorder ([address]     Chronic back pain     CVA (cerebral vascular accident) ([address]     Diabetes mellitus, type 2 ([address]     Dyslipidemia     Fibromyalgia     GERD (gastroesophageal reflux disease)     Hypertension     Schizophrenia ([address]      Past Surgical History   Procedure Laterality Date    Hx knee arthroscopy      Hx bunionectomy      Hx cholecystectomy      Hx tubal ligation      Hx hysterectomy      Hx other surgical       orbit blow out repair. SOCIAL HISTORY: Social history from the initial psychiatric evaluation has been reviewed (reviewed/updated 1/30/2017) with no additional updates (I asked patient and no additional social history provided).    Social History     Social History    Marital status: SINGLE     Spouse name: N/A    Number of children: N/A    Years of education: N/A     Occupational History    Not on file. Social History Main Topics    Smoking status: Current Every Day Smoker     Types: Cigarettes    Smokeless tobacco: Not on file    Alcohol use Not on file    Drug use: Yes     Special: Opiates    Sexual activity: Not on file     Other Topics Concern    Not on file     Social History Narrative    Lives in a trailer alone, in [place]. . 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 [employer] diploma. Has one son, age 39. FAMILY HISTORY: Family history from the initial psychiatric evaluation has been reviewed (reviewed/updated 1/30/2017) with no additional updates (I asked patient and no additional family history provided). History reviewed. No pertinent family history. REVIEW OF SYSTEMS: (reviewed/updated 1/30/2017)  Appetite:improved   Sleep: improved   All other Review of Systems: Respiratory ROS: no cough, shortness of breath, or wheezing  negative  Cardiovascular ROS: no chest pain or dyspnea on exertion  negative  Gastrointestinal ROS: no abdominal pain, change in bowel habits, or black or bloody stools  negative  Neurological ROS: no TIA or stroke symptoms  negative         [facility] (MSE):    MSE FINDINGS ARE WITHIN NORMAL LIMITS (WNL) UNLESS OTHERWISE STATED BELOW. ( ALL OF THE BELOW CATEGORIES OF THE MSE HAVE BEEN REVIEWED (reviewed 1/30/2017) AND UPDATED AS DEEMED APPROPRIATE )  General Presentation disheveled and overweight, evasive, guarded, uncooperative and vague   Orientation disorganized   Vital Signs  See below (reviewed 1/30/2017); Vital Signs (BP, Pulse, & Temp) are within normal limits if not listed below.    Gait and Station Stable/steady, no ataxia   Musculoskeletal System No extrapyramidal symptoms (EPS); no abnormal muscular movements or Tardive Dyskinesia (TD); muscle strength and tone are within normal limits   Language No aphasia or dysarthria   Speech:  hypoverbal and monotone   Thought Processes llogical; slow rate of thoughts; poor abstract reasoning/computation   Thought Associations blocked  and goal directed   Thought Content paranoid delusions and free of hallucinations   Suicidal Ideations no plan , no intention and none   Homicidal Ideations none   Mood:  angry, hostile , irritable and labile    Affect:  irritable, labile and odd demeanor   Memory recent  impaired   Memory remote:  fair   Concentration/Attention:  distractable   Fund of Knowledge below average   Insight:  limited   Reliability fair   Judgment:  limited          VITALS:     Patient Vitals for the past 24 hrs:   Temp Pulse Resp BP   01/30/17 1200 95.9 °F (35.5 °C) 89 18 131/76   01/30/17 0607 97.5 °F (36.4 °C) 93 18 (!) 184/103   01/29/17 2134 - 86 - 164/89   01/29/17 1830 - - - 158/90     Wt Readings from Last 3 Encounters:   07/07/16 96.8 kg (213 lb 6.4 oz)     Temp Readings from Last 3 Encounters:   01/30/17 95.9 °F (35.5 °C)   07/08/16 98.2 °F (36.8 °C)     BP Readings from Last 3 Encounters:   01/30/17 131/76   07/08/16 119/60     Pulse Readings from Last 3 Encounters:   01/30/17 89   07/08/16 94            DATA     LABORATORY DATA:(reviewed/updated 1/30/2017)  Recent Results (from the past 24 hour(s))   LIPID PANEL    Collection Time: 01/30/17  5:00 AM   Result Value Ref Range    LIPID PROFILE          Cholesterol, total 122 <200 MG/DL    Triglyceride 107 <150 MG/DL    HDL Cholesterol 42 MG/DL    LDL, calculated 58.6 0 - 100 MG/DL    VLDL, calculated 21.4 MG/DL    CHOL/HDL Ratio 2.9 0 - 5.0     TSH 3RD GENERATION    Collection Time: 01/30/17  5:00 AM   Result Value Ref Range    TSH 0.70 0.36 - 3.74 uIU/mL   GLUCOSE, FASTING    Collection Time: 01/30/17  5:00 AM   Result Value Ref Range    Glucose 97 65 - 100 MG/DL   GLUCOSE, POC    Collection Time: 01/30/17  8:11 AM   Result Value Ref Range    Glucose (POC) 127 (H) 65 - 100 mg/dL    Performed by Lm Atwood GLUCOSE, POC    Collection Time: 01/30/17  4:25 PM   Result Value Ref Range    Glucose (POC) 100 65 - 100 mg/dL    Performed by Aaron Urena      No results found for: VALF2, VALAC, VALP, VALPR, DS6, CRBAM, CRBAMP, CARB2, XCRBAM  No results found for: LI, LIH, LITHM   RADIOLOGY REPORTS:(reviewed/updated 1/30/2017)  No results found.        MEDICATIONS     ALL MEDICATIONS:   Current Facility-Administered Medications   Medication Dose Route Frequency    metoprolol tartrate (LOPRESSOR) tablet 25 mg  25 mg Oral Q12H    risperiDONE (RisperDAL) tablet 2 mg  2 mg Oral BID    Or    haloperidol lactate (HALDOL) injection 2 mg +++COURT ORDERED MEDICATION FOR REFUSAL OF PO RISPERIDONE+++  2 mg IntraMUSCular BID    cloNIDine HCl (CATAPRES) tablet 0.2 mg  0.2 mg Oral Q6H PRN    aspirin (ASPIRIN) tablet 325 mg  325 mg Oral DAILY    cholecalciferol (VITAMIN D3) tablet 4,000 Units  4,000 Units Oral DAILY    pantoprazole (PROTONIX) tablet 40 mg  40 mg Oral ACB    atorvastatin (LIPITOR) tablet 40 mg  40 mg Oral QHS    insulin lispro (HUMALOG) injection   SubCUTAneous ACB&D    glucose chewable tablet 16 g  4 Tab Oral PRN    dextrose (D50W) injection syrg 12.5-25 g  12.5-25 g IntraVENous PRN    glucagon (GLUCAGEN) injection 1 mg  1 mg IntraMUSCular PRN    ziprasidone (GEODON) 20 mg in sterile water (preservative free) 1 mL injection  20 mg IntraMUSCular BID PRN    OLANZapine (ZyPREXA) tablet 5 mg  5 mg Oral Q6H PRN    LORazepam (ATIVAN) injection 2 mg  2 mg IntraMUSCular Q4H PRN    LORazepam (ATIVAN) tablet 1 mg  1 mg Oral Q4H PRN    zolpidem (AMBIEN) tablet 10 mg  10 mg Oral QHS PRN    acetaminophen (TYLENOL) tablet 650 mg  650 mg Oral Q4H PRN    ibuprofen (MOTRIN) tablet 400 mg  400 mg Oral Q8H PRN    magnesium hydroxide (MILK OF MAGNESIA) 400 mg/5 mL oral suspension 30 mL  30 mL Oral DAILY PRN    nicotine (NICODERM CQ) 21 mg/24 hr patch 1 Patch  1 Patch TransDERmal DAILY PRN    haloperidol lactate (HALDOL) injection 1 mg++Court-ordered med for refusal of Depakote++  1 mg IntraMUSCular BID    Or    divalproex ER (DEPAKOTE ER) 24 hour tablet 750 mg  750 mg Oral BID      SCHEDULED MEDICATIONS:   Current Facility-Administered Medications   Medication Dose Route Frequency    metoprolol tartrate (LOPRESSOR) tablet 25 mg  25 mg Oral Q12H    risperiDONE (RisperDAL) tablet 2 mg  2 mg Oral BID    Or    haloperidol lactate (HALDOL) injection 2 mg +++COURT ORDERED MEDICATION FOR REFUSAL OF PO RISPERIDONE+++  2 mg IntraMUSCular BID    aspirin (ASPIRIN) tablet 325 mg  325 mg Oral DAILY    cholecalciferol (VITAMIN D3) tablet 4,000 Units  4,000 Units Oral DAILY    pantoprazole (PROTONIX) tablet 40 mg  40 mg Oral ACB    atorvastatin (LIPITOR) tablet 40 mg  40 mg Oral QHS    insulin lispro (HUMALOG) injection   SubCUTAneous ACB&D    haloperidol lactate (HALDOL) injection 1 mg++Court-ordered med for refusal of Depakote++  1 mg IntraMUSCular BID    Or    divalproex ER (DEPAKOTE ER) 24 hour tablet 750 mg  750 mg Oral BID          ASSESSMENT & PLAN     DIAGNOSES REQUIRING ACTIVE TREATMENT AND MONITORING: (reviewed/updated 1/30/2017)  Patient Active Hospital Problem List:     Bipolar affective, mixed, sev w/ psych vs schizoaffective dis vs schizophrenia  Assessment: wide differential. Need more collateral info  Plan: start mood stabilizer and AP along with benzos. I will monitor blood levels (Depakote---a drug with a narrow therapeutic index= NTI) and associated labs for drug therapy implemented that require intense monitoring for toxicity as deemed appropriate base on current medication side effects and pharmacodynamically determined drug 1/2 lives. Will get a med petition. Essential hypertension (7/6/2016)  Assessment: elevated  Plan: restart bp meds    Catatonic state ([facility] (1/27/2017)  Assessment: related to underlying psychotic/mood dis, now resolved  Plan: tx underlying psych etiology.  Monitor for malignant catatonia, dc APs if vitals escalate      In summary, Jose Luis Cabello, is a 49 y.o.  female who presents with a severe exacerbation of the principal diagnosis of Bipolar affective, mixed, sev w/ psych [facility])  Patient's condition is worsening/not improving/not stable . Patient requires continued inpatient hospitalization for further stabilization, safety monitoring and medication management. I will continue to coordinate the provision of individual, milieu, occupational, group, and substance abuse therapies to address target symptoms/diagnoses as deemed appropriate for the individual patient. A coordinated, multidisplinary treatment team round was conducted with the patient (this team consists of the nurse, psychiatric unit pharmcist,  and writer). Complete current electronic health record for patient has been reviewed today including consultant notes, ancillary staff notes, nurses and psychiatric tech notes. Suicide risk assessment completed and patient deemed to be of low risk for suicide at this time. The following regarding medications was addressed during rounds with patient:   the risks and benefits of the proposed medication. The patient was given the opportunity to ask questions. Informed consent given to the use of the above medications. Will continue to adjust psychiatric and non-psychiatric medications (see above \"medication\" section and orders section for details) as deemed appropriate & based upon diagnoses and response to treatment. I will continue to order blood tests/labs and diagnostic tests as deemed appropriate and review results as they become available (see orders for details and above listed lab/test results). I will order psychiatric records from previous [facility] hospitals to further elucidate the nature of patient's psychopathology and review once available.     I will gather additional collateral information from friends, family and o/p treatment team to further elucidate the nature of patient's psychopathology and baselline level of psychiatric functioning. I certify that this patient's inpatient psychiatric hospital services furnished since the previous certification were, and continue to be, required for treatment that could reasonably be expected to improve the patient's condition, or for diagnostic study, and that the patient continues to need, on a daily basis, active treatment furnished directly by or requiring the supervision of inpatient psychiatric facility personnel. In addition, the hospital records show that services furnished were intensive treatment services, admission or related services, or equivalent services.     EXPECTED DISCHARGE DATE/DAY: TBD     DISPOSITION: Home       Signed By:   Hannah Stokes MD  1/30/2017

## 2017-01-31 LAB
GLUCOSE BLD STRIP.AUTO-MCNC: 89 MG/DL (ref 65–100)
SERVICE CMNT-IMP: NORMAL

## 2017-01-31 PROCEDURE — 82962 GLUCOSE BLOOD TEST: CPT

## 2017-01-31 PROCEDURE — 74011250637 HC RX REV CODE- 250/637: Performed by: PSYCHIATRY & NEUROLOGY

## 2017-01-31 PROCEDURE — 74011250637 HC RX REV CODE- 250/637: Performed by: INTERNAL MEDICINE

## 2017-01-31 PROCEDURE — 65220000003 HC RM SEMIPRIVATE PSYCH

## 2017-01-31 RX ORDER — HALOPERIDOL 5 MG/ML
2 INJECTION INTRAMUSCULAR 2 TIMES DAILY
Status: DISCONTINUED | OUTPATIENT
Start: 2017-01-31 | End: 2017-02-02

## 2017-01-31 RX ORDER — RISPERIDONE 3 MG/1
3 TABLET, FILM COATED ORAL 2 TIMES DAILY
Status: DISCONTINUED | OUTPATIENT
Start: 2017-01-31 | End: 2017-02-02

## 2017-01-31 RX ADMIN — VITAMIN D, TAB 1000IU (100/BT) 4000 UNITS: 25 TAB at 10:07

## 2017-01-31 RX ADMIN — METOPROLOL TARTRATE 25 MG: 25 TABLET ORAL at 10:06

## 2017-01-31 RX ADMIN — RISPERIDONE 3 MG: 3 TABLET ORAL at 17:58

## 2017-01-31 RX ADMIN — DIVALPROEX SODIUM 750 MG: 500 TABLET, FILM COATED, EXTENDED RELEASE ORAL at 10:06

## 2017-01-31 RX ADMIN — DIVALPROEX SODIUM 750 MG: 500 TABLET, FILM COATED, EXTENDED RELEASE ORAL at 17:58

## 2017-01-31 RX ADMIN — METOPROLOL TARTRATE 25 MG: 25 TABLET ORAL at 21:20

## 2017-01-31 RX ADMIN — ASPIRIN 325 MG ORAL TABLET 325 MG: 325 PILL ORAL at 10:07

## 2017-01-31 RX ADMIN — RISPERIDONE 2 MG: 1 TABLET ORAL at 10:06

## 2017-01-31 RX ADMIN — PANTOPRAZOLE SODIUM 40 MG: 40 TABLET, DELAYED RELEASE ORAL at 06:12

## 2017-01-31 RX ADMIN — ATORVASTATIN CALCIUM 40 MG: 40 TABLET, FILM COATED ORAL at 21:20

## 2017-01-31 NOTE — BH NOTES
Pt spent the shift in the bed only on the unit to have her needs met. Pt ate breakfast and lunch. Pt will continue to be monitored for safety.

## 2017-01-31 NOTE — BH NOTES
SOCIAL WORK NOTE:  Pt did not attend processing group.      HARLEY Brewer, Supervisee in Social Work

## 2017-01-31 NOTE — BH NOTES
Yolande Means, RN Registered Nurse Signed Behavioral Health Progress Notes Date of Service: 01/30/17 6103         Problem: Altered Thought Process (Adult/Pediatric)  Goal: *STG: Remains safe in hospital  Outcome: Progressing Towards Goal  Pt low key this tour, active minimal interaction limited needs isolating in room much of tour, no behavioral or management concern, Denies SI/HI. compliant with meals and medications. Will continue to monitor pt and assess needs.

## 2017-01-31 NOTE — PROGRESS NOTES
Problem: Altered Thought Process (Adult/Pediatric)  Goal: *STG: Remains safe in hospital  Outcome: Progressing Towards Goal  Pt rested quietly in bed with eyes closed. No signs/symptoms of distress, agitation, or anxiety. Will monitor for changes. Q 15 minute checks continue.  Pt slept 8 hrs

## 2017-01-31 NOTE — BH NOTES
GROUP THERAPY PROGRESS NOTE    Ryanne Van is participating in Coping Skills Group.      Group time: 15 minutes    Personal goal for participation: discussed positive coping skills     Goal orientation: personal    Group therapy participation: minimal    Therapeutic interventions reviewed and discussed: yes    Impression of participation: fair

## 2017-02-01 LAB
GLUCOSE BLD STRIP.AUTO-MCNC: 110 MG/DL (ref 65–100)
SERVICE CMNT-IMP: ABNORMAL

## 2017-02-01 PROCEDURE — 74011250637 HC RX REV CODE- 250/637

## 2017-02-01 PROCEDURE — 65220000003 HC RM SEMIPRIVATE PSYCH

## 2017-02-01 PROCEDURE — 74011250637 HC RX REV CODE- 250/637: Performed by: PSYCHIATRY & NEUROLOGY

## 2017-02-01 PROCEDURE — 74011250637 HC RX REV CODE- 250/637: Performed by: INTERNAL MEDICINE

## 2017-02-01 PROCEDURE — 82962 GLUCOSE BLOOD TEST: CPT

## 2017-02-01 RX ORDER — CLONAZEPAM 1 MG/1
1 TABLET ORAL 3 TIMES DAILY
Status: DISCONTINUED | OUTPATIENT
Start: 2017-02-01 | End: 2017-02-09

## 2017-02-01 RX ADMIN — OLANZAPINE 5 MG: 5 TABLET, FILM COATED ORAL at 14:40

## 2017-02-01 RX ADMIN — ATORVASTATIN CALCIUM 40 MG: 40 TABLET, FILM COATED ORAL at 21:21

## 2017-02-01 RX ADMIN — DIVALPROEX SODIUM 750 MG: 500 TABLET, FILM COATED, EXTENDED RELEASE ORAL at 16:30

## 2017-02-01 RX ADMIN — VITAMIN D, TAB 1000IU (100/BT) 4000 UNITS: 25 TAB at 08:44

## 2017-02-01 RX ADMIN — ASPIRIN 325 MG ORAL TABLET 325 MG: 325 PILL ORAL at 08:44

## 2017-02-01 RX ADMIN — CLONAZEPAM 1 MG: 1 TABLET ORAL at 16:31

## 2017-02-01 RX ADMIN — RISPERIDONE 3 MG: 3 TABLET ORAL at 16:30

## 2017-02-01 RX ADMIN — PANTOPRAZOLE SODIUM 40 MG: 40 TABLET, DELAYED RELEASE ORAL at 06:19

## 2017-02-01 RX ADMIN — CLONAZEPAM 1 MG: 1 TABLET ORAL at 12:17

## 2017-02-01 RX ADMIN — METOPROLOL TARTRATE 25 MG: 25 TABLET ORAL at 21:21

## 2017-02-01 RX ADMIN — RISPERIDONE 3 MG: 3 TABLET ORAL at 08:44

## 2017-02-01 RX ADMIN — METOPROLOL TARTRATE 25 MG: 25 TABLET ORAL at 08:44

## 2017-02-01 RX ADMIN — DIVALPROEX SODIUM 750 MG: 500 TABLET, FILM COATED, EXTENDED RELEASE ORAL at 08:44

## 2017-02-01 NOTE — BH NOTES
After lunch time pt went back to her room and stayed there in bed resting with the door shut the patient was checked on every 15 mins.

## 2017-02-01 NOTE — BH NOTES
Shannon Chen who was absent from [facility]    Jessica Encinas  2/1/2017  3:00 PM

## 2017-02-01 NOTE — BH NOTES
Social Work     Met with pt in team for treatment and discharge planning. Pt is disorganized. Pt denies SI/HI. Pt's mood is irritable, hostile, labile; affect is irritable, odd demeanor. Thought process is illogical, abstract reasoning is poor. Insight and judgment is poor, reliability is poor. Pt told treatment team her sleep was poor due to \"niggers\" and proceeded to call the charge nurse the same derogatory term. Pt scowled at treatment team and continued to make eye contact with  while walking out of the room.  spoke with Beverley from [place] [id number] PACT Team (phone: [phone]; on call phone: [phone]; fax: [phone]) to provide a treatment update and obtain additional collateral information. Clinician shared that pt was recently opened to their PACT team on 1/9/17 after 5 repeated psychiatric hospitalizations since July 2016. Clinician shared pt was hospitalized at the following facilities: [facility] from 7/4/16-7/8/16 and [address] [place] [address] from 7/9/16-7/21/16, 9/12/16-11/4/16, 11/9/16-11/14/16, and most recent hospitalization was for almost two months from 11/15/16-1/4/17. Clinician stated PACT team has had limited contact with pt since she was opened to their team. Clinician shared that pt did not answer her door on several occasions when PACT tried to visit. Clinician stated that pt was referred to PACT for her inability to manage her own medications in the community. Social work department will continue to coordinate pt's discharge plans.

## 2017-02-01 NOTE — BH NOTES
Patient is out of open seclusion. Patient was educated about the rules and regulations of the unit. Patient contracts for safety. Will continue to monitor patient and assess needs.

## 2017-02-01 NOTE — BH NOTES
Patient is walking up to staff calling then kelsey and jenna. Patient was educated about rules and regulation of the unit. Patient placed in open seclusion and given. Will continue to monitor patient and assess needs.

## 2017-02-01 NOTE — BH NOTES
Pt attempted to cheek her scheduled clonazepam at lunch time. RN noticed and encouraged pt to swallow med. Pt responded by chewing med, stating \"How's that nigger? \"

## 2017-02-01 NOTE — BH NOTES
SOCIAL WORK NOTE:  Pt did not attend processing group.      HARLEY Taylor, Supervisee in Social Work

## 2017-02-01 NOTE — PROGRESS NOTES
Problem: Altered Thought Process (Adult/Pediatric)  Goal: *STG: Remains safe in hospital  Client has very odd behavior this pm.  Smiling and standing in front of one of the dementia pts whe has difficulty following direction with out getting mad, and she was standing right in front of him. When redirected, cursed at nurse. Has been seen sleeping and then doing a small amount of pacing this pm.  Has no verbal complaint, does not seen to communicate with her roommate either. Will continue to observe with q 15 min checks for safety.

## 2017-02-01 NOTE — BH NOTES
PSYCHIATRIC PROGRESS NOTE         Patient Name  Farideh Hayward   Date of Birth 1960   [facility] [id number]   Medical Record Number  [id number]      Age  49 y.o. PCP None   Admit date:  1/27/2017    Room Number  451/74  @ [facility]   Date of Service  2/1/2017          PSYCHOTHERAPY SESSION NOTE:  Length of psychotherapy session: 20 minutes    Main condition/diagnosis/issues treated during session today, 2/1/2017 : raeann, tx, psychosis, denial    I employed Cognitive Behavioral therapy techniques, Reality-Oriented psychotherapy, as well as supportive psychotherapy in regards to various ongoing psychosocial stressors, including the following: pre-admission and current problems; housing issues; occupational issues; medical issues; and stress of hospitalization. Interpersonal relationship issues and psychodynamic conflicts explored. Attempts made to alleviate maladaptive patterns. Overall, patient is minially progressing    Treatment Plan Update (reviewed an updated 2/1/2017) : I will modify psychotherapy tx plan by implementing more stress management strategies, building upon cognitive behavioral techniques, increasing coping skills, as well as shoring up psychological defenses). An extended energy and skill set was needed to engage pt in psychotherapy due to some of the following: resistiveness, complexity, negativity, confrontational nature, hostile behaviors, and/or severe abnormalities in thought processes/psychosis resulting in the loss of expressive/receptive language communication skills. E & M PROGRESS NOTE:         HISTORY       CC:  \"Niggers\"  HISTORY OF PRESENT ILLNESS/INTERVAL HISTORY:  (reviewed/updated 2/1/2017). per initial evaluation: The patient, Farideh Hayward, is a 49 y.o.  WHITE OR   WHITE OR  female with a past psychiatric history significant for bipolar vs schizophrenia, who presents at this time with complaints of (and/or evidence of) the following emotional symptoms: psychotic behavior, paranoid behavior, raeann, psychosis and catatonia. Additional symptomatology include loud, hyperverbal, combative, demanding, entitled, anger outbursts, difficulty sleeping and hearing voices. The above symptoms have been present for a week. These symptoms are of severe severity. These symptoms are constant in nature. The patient's condition has been precipitated by psychosocial stressors. Patient's condition made worse by treatment noncompliance. UDS +negative; BAL=0. Pt given IM psych meds on adm due to severe combativeness. Samira Man presents/reports/evidences the following emotional symptoms today, 2/1/2017:raeann and psychosis. The above symptoms have been present for weeks. These symptoms are of severe severity. The symptoms are constant in nature. Additional symptomatology and features include blocking/severe disorganization of thought processes, angry, labile, paranoid, [place]--internally preoc, racial slurs, labile moods. SIDE EFFECTS: (reviewed/updated 2/1/2017)  None reported or admitted to. No noted toxicity with use of Depakote   ALLERGIES:(reviewed/updated 2/1/2017)  Allergies   Allergen Reactions    Augmentin [Amoxicillin-Pot Clavulanate] Rash    Codeine Nausea Only    Cogentin [Benztropine] Other (comments)     Makes her sick    Iodine Other (comments)    Macrodantin [Nitrofurantoin Macrocrystalline] Rash    Morphine Shortness of Breath    Mucinex [Guaifenesin] Shortness of Breath    Oxycodone Other (comments)    Prednisone Anxiety      MEDICATIONS PRIOR TO ADMISSION:(reviewed/updated 2/1/2017)  Prescriptions Prior to Admission   Medication Sig    metoprolol tartrate (LOPRESSOR) 25 mg tablet Take 0.5 Tabs by mouth every twelve (12) hours. Indications: HYPERTENSION    risperiDONE (RISPERDAL) 2 mg tablet Take 1 Tab by mouth two (2) times a day.  Indications: BIPOLAR DISORDER IN REMISSION, SCHIZOPHRENIA    DULoxetine (CYMBALTA) 30 mg capsule Take 1 Cap by mouth nightly. Indications: ANXIETY WITH DEPRESSION, DEPRESSION    DULoxetine (CYMBALTA) 60 mg capsule Take 1 Cap by mouth daily. Indications: ANXIETY WITH DEPRESSION, DEPRESSION    aspirin (ASPIRIN) 325 mg tablet Take 325 mg by mouth daily. Indications: MYOCARDIAL INFARCTION PREVENTION    hydrOXYzine (ATARAX) 50 mg tablet Take 50 mg by mouth every six (6) hours as needed for Anxiety. Indications: ANXIETY    melatonin 3 mg tablet Take 3 mg by mouth nightly. Indications: INSOMNIA    omeprazole (PRILOSEC) 20 mg capsule Take 20 mg by mouth daily. Indications: HEARTBURN    simvastatin (ZOCOR) 40 mg tablet Take 40 mg by mouth nightly. Indications: HYPERCHOLESTEROLEMIA    cholecalciferol, vitamin D3, 2,000 unit tab Take 8,000 Int'l Units by mouth daily. Indications: PREVENTION OF VITAMIN D DEFICIENCY    magnesium oxide (MAG-OX) 400 mg tablet Take 400 mg by mouth daily. Indications: HYPOMAGNESEMIA      PAST MEDICAL HISTORY: Past medical history from the initial psychiatric evaluation has been reviewed (reviewed/updated 2/1/2017) with no additional updates (I asked patient and no additional past medical history provided). Past Medical History   Diagnosis Date    Anxiety     Asthma     Bipolar affective disorder ([address]     Chronic back pain     CVA (cerebral vascular accident) ([address]     Diabetes mellitus, type 2 ([address]     Dyslipidemia     Fibromyalgia     GERD (gastroesophageal reflux disease)     Hypertension     Schizophrenia ([facility]      Past Surgical History   Procedure Laterality Date    Hx knee arthroscopy      Hx bunionectomy      Hx cholecystectomy      Hx tubal ligation      Hx hysterectomy      Hx other surgical       orbit blow out repair.       SOCIAL HISTORY: Social history from the initial psychiatric evaluation has been reviewed (reviewed/updated 2/1/2017) with no additional updates (I asked patient and no additional social history provided). Social History     Social History    Marital status: SINGLE     Spouse name: N/A    Number of children: N/A    Years of education: N/A     Occupational History    Not on file. Social History Main Topics    Smoking status: Current Every Day Smoker     Types: Cigarettes    Smokeless tobacco: Not on file    Alcohol use Not on file    Drug use: Yes     Special: Opiates    Sexual activity: Not on file     Other Topics Concern    Not on file     Social History Narrative    Lives in a trailer alone, in [facility]. . 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 [employer] HS diploma. Has one son, age 39. FAMILY HISTORY: Family history from the initial psychiatric evaluation has been reviewed (reviewed/updated 2/1/2017) with no additional updates (I asked patient and no additional family history provided). History reviewed. No pertinent family history. REVIEW OF SYSTEMS: (reviewed/updated 2/1/2017)  Appetite:improved   Sleep: improved   All other Review of Systems: Respiratory ROS: no cough, shortness of breath, or wheezing  Cardiovascular ROS: no chest pain or dyspnea on exertion  Gastrointestinal ROS: no abdominal pain, change in bowel habits, or black or bloody stools  Neurological ROS: no TIA or stroke symptoms  negative         [address] [place] [address] ([facility]):    [facility] FINDINGS ARE WITHIN NORMAL LIMITS (WNL) UNLESS OTHERWISE STATED BELOW. ( ALL OF THE BELOW CATEGORIES OF THE [facility] HAVE BEEN REVIEWED (reviewed 2/1/2017) AND UPDATED AS DEEMED APPROPRIATE )  General Presentation disheveled and overweight, evasive, guarded, uncooperative and vague   Orientation disorganized   Vital Signs  See below (reviewed 2/1/2017); Vital Signs (BP, Pulse, & Temp) are within normal limits if not listed below.    Gait and Station Stable/steady, no ataxia   Musculoskeletal System No extrapyramidal symptoms (EPS); no abnormal muscular movements or Tardive Dyskinesia (TD); muscle strength and tone are within normal limits   Language No aphasia or dysarthria   Speech:  hypoverbal and monotone, vulgar, racial slurs   Thought Processes llogical; slow rate of thoughts; poor abstract reasoning/computation   Thought Associations blocked  and goal directed   Thought Content paranoid delusions and free of hallucinations, internally preoc   Suicidal Ideations no plan , no intention and none   Homicidal Ideations none   Mood:  angry, hostile , irritable and labile    Affect:  Irritable, and odd demeanor   Memory recent  impaired   Memory remote:  fair   Concentration/Attention:  fair   Fund of Knowledge below average   Insight:  limited   Reliability fair   Judgment:  limited          VITALS:     Patient Vitals for the past 24 hrs:   Pulse Resp BP   02/01/17 0914 84 16 119/70   02/01/17 0844 97 - (!) 150/92   01/31/17 2120 79 - (!) 191/104     Wt Readings from Last 3 Encounters:   01/31/17 91.7 kg (202 lb 2 oz)   07/07/16 96.8 kg (213 lb 6.4 oz)     Temp Readings from Last 3 Encounters:   01/31/17 97.6 °F (36.4 °C)   07/08/16 98.2 °F (36.8 °C)     BP Readings from Last 3 Encounters:   02/01/17 119/70   07/08/16 119/60     Pulse Readings from Last 3 Encounters:   02/01/17 84   07/08/16 94            DATA     LABORATORY DATA:(reviewed/updated 2/1/2017)  Recent Results (from the past 24 hour(s))   GLUCOSE, POC    Collection Time: 02/01/17  4:37 PM   Result Value Ref Range    Glucose (POC) 110 (H) 65 - 100 mg/dL    Performed by Bharti Wall      No results found for: VALF2, VALAC, VALP, VALPR, DS6, CRBAM, CRBAMP, CARB2, XCRBAM  No results found for: LI, LIH, LITHM   RADIOLOGY REPORTS:(reviewed/updated 2/1/2017)  No results found.        MEDICATIONS     ALL MEDICATIONS:   Current Facility-Administered Medications   Medication Dose Route Frequency    clonazePAM (KlonoPIN) tablet 1 mg  1 mg Oral TID    risperiDONE (RisperDAL) tablet 3 mg  3 mg Oral BID    Or    haloperidol lactate (HALDOL) injection 2 mg +++COURT ORDERED MEDICATION FOR REFUSAL OF PO RISPERIDONE+++  2 mg IntraMUSCular BID    metoprolol tartrate (LOPRESSOR) tablet 25 mg  25 mg Oral Q12H    cloNIDine HCl (CATAPRES) tablet 0.2 mg  0.2 mg Oral Q6H PRN    aspirin (ASPIRIN) tablet 325 mg  325 mg Oral DAILY    cholecalciferol (VITAMIN D3) tablet 4,000 Units  4,000 Units Oral DAILY    pantoprazole (PROTONIX) tablet 40 mg  40 mg Oral ACB    atorvastatin (LIPITOR) tablet 40 mg  40 mg Oral QHS    insulin lispro (HUMALOG) injection   SubCUTAneous ACB&D    glucose chewable tablet 16 g  4 Tab Oral PRN    dextrose (D50W) injection syrg 12.5-25 g  12.5-25 g IntraVENous PRN    glucagon (GLUCAGEN) injection 1 mg  1 mg IntraMUSCular PRN    ziprasidone (GEODON) 20 mg in sterile water (preservative free) 1 mL injection  20 mg IntraMUSCular BID PRN    OLANZapine (ZyPREXA) tablet 5 mg  5 mg Oral Q6H PRN    LORazepam (ATIVAN) injection 2 mg  2 mg IntraMUSCular Q4H PRN    LORazepam (ATIVAN) tablet 1 mg  1 mg Oral Q4H PRN    zolpidem (AMBIEN) tablet 10 mg  10 mg Oral QHS PRN    acetaminophen (TYLENOL) tablet 650 mg  650 mg Oral Q4H PRN    ibuprofen (MOTRIN) tablet 400 mg  400 mg Oral Q8H PRN    magnesium hydroxide (MILK OF MAGNESIA) 400 mg/5 mL oral suspension 30 mL  30 mL Oral DAILY PRN    nicotine (NICODERM CQ) 21 mg/24 hr patch 1 Patch  1 Patch TransDERmal DAILY PRN    haloperidol lactate (HALDOL) injection 1 mg++Court-ordered med for refusal of Depakote++  1 mg IntraMUSCular BID    Or    divalproex ER (DEPAKOTE ER) 24 hour tablet 750 mg  750 mg Oral BID      SCHEDULED MEDICATIONS:   Current Facility-Administered Medications   Medication Dose Route Frequency    clonazePAM (KlonoPIN) tablet 1 mg  1 mg Oral TID    risperiDONE (RisperDAL) tablet 3 mg  3 mg Oral BID    Or    haloperidol lactate (HALDOL) injection 2 mg +++COURT ORDERED MEDICATION FOR REFUSAL OF PO RISPERIDONE+++  2 mg IntraMUSCular BID    metoprolol tartrate (LOPRESSOR) tablet 25 mg  25 mg Oral Q12H    aspirin (ASPIRIN) tablet 325 mg  325 mg Oral DAILY    cholecalciferol (VITAMIN D3) tablet 4,000 Units  4,000 Units Oral DAILY    pantoprazole (PROTONIX) tablet 40 mg  40 mg Oral ACB    atorvastatin (LIPITOR) tablet 40 mg  40 mg Oral QHS    insulin lispro (HUMALOG) injection   SubCUTAneous ACB&D    haloperidol lactate (HALDOL) injection 1 mg++Court-ordered med for refusal of Depakote++  1 mg IntraMUSCular BID    Or    divalproex ER (DEPAKOTE ER) 24 hour tablet 750 mg  750 mg Oral BID          ASSESSMENT & PLAN     DIAGNOSES REQUIRING ACTIVE TREATMENT AND MONITORING: (reviewed/updated 2/1/2017)  Patient Active Hospital Problem List:     Bipolar affective, mixed, sev w/ psych vs schizoaffective dis vs schizophrenia  Assessment: wide differential. Need more collateral info. Order old recs  Minimally  better, still extremely psychotic and labile. Calling staff \"mer\". Collateral info from \"Rosey from [place] [address] PACT Team (phone: [phone]; on call phone: [phone]; fax: [phone]) to provide a treatment update and obtain additional collateral information. Clinician shared that pt was recently opened to their PACT team on 1/9/17 after 5 repeated psychiatric hospitalizations since July 2016. Clinician shared pt was hospitalized at the following facilities: [facility] from 7/4/16-7/8/16 and [address] [place] [address] from 7/9/16-7/21/16, 9/12/16-11/4/16, 11/9/16-11/14/16, and most recent hospitalization was for almost two months from 11/15/16-1/4/17. Clinician stated PACT team has had limited contact with pt since she was opened to their team. Clinician shared that pt did not answer her door on several occasions when PACT tried to visit. Clinician stated that pt was referred to PACT for her inability to manage her own medications in the community. \"  Plan: cont to adjust psych meds.  Started  mood stabilizer and AP along with benzos---titrate. I will monitor blood levels (Depakote---a drug with a narrow therapeutic index= NTI) and associated labs for drug therapy implemented that require intense monitoring for toxicity as deemed appropriate base on current medication side effects and pharmacodynamically determined drug 1/2 lives. Levels in am.    Essential hypertension (7/6/2016)  Assessment: still quite elevated  Plan: cont to adjust bp meds, case d./w pharmacist.    Catatonic state ([address] (1/27/2017)  Assessment: related to underlying psychotic/mood dis, now resolved  Plan: tx underlying psych etiology. Monitor for malignant catatonia, dc APs if vitals escalate      In summary, Lise Duval, is a 49 y.o.  female who presents with a severe exacerbation of the principal diagnosis of Bipolar affective, mixed, sev w/ psych [facility])  Patient's condition is worsening/not improving/not stable . Patient requires continued inpatient hospitalization for further stabilization, safety monitoring and medication management. I will continue to coordinate the provision of individual, milieu, occupational, group, and substance abuse therapies to address target symptoms/diagnoses as deemed appropriate for the individual patient. A coordinated, multidisplinary treatment team round was conducted with the patient (this team consists of the nurse, psychiatric unit pharmcist,  and writer). Complete current electronic health record for patient has been reviewed today including consultant notes, ancillary staff notes, nurses and psychiatric tech notes. Suicide risk assessment completed and patient deemed to be of low risk for suicide at this time. The following regarding medications was addressed during rounds with patient:   the risks and benefits of the proposed medication. The patient was given the opportunity to ask questions. Informed consent given to the use of the above medications.  Will continue to adjust psychiatric and non-psychiatric medications (see above \"medication\" section and orders section for details) as deemed appropriate & based upon diagnoses and response to treatment. I will continue to order blood tests/labs and diagnostic tests as deemed appropriate and review results as they become available (see orders for details and above listed lab/test results). I will order psychiatric records from previous [facility] hospitals to further elucidate the nature of patient's psychopathology and review once available. I will gather additional collateral information from friends, family and o/p treatment team to further elucidate the nature of patient's psychopathology and baselline level of psychiatric functioning. I certify that this patient's inpatient psychiatric hospital services furnished since the previous certification were, and continue to be, required for treatment that could reasonably be expected to improve the patient's condition, or for diagnostic study, and that the patient continues to need, on a daily basis, active treatment furnished directly by or requiring the supervision of inpatient psychiatric facility personnel. In addition, the hospital records show that services furnished were intensive treatment services, admission or related services, or equivalent services.     EXPECTED DISCHARGE DATE/DAY: TBD     DISPOSITION: Home       Signed By:   Ray Cui MD  2/1/2017

## 2017-02-01 NOTE — BH NOTES
Remained in bed resting quietly x 4 hrs , disagreement with roommate prevented pt from getting full night sleep  monitored q15 minutes

## 2017-02-02 LAB
GLUCOSE BLD STRIP.AUTO-MCNC: 114 MG/DL (ref 65–100)
SERVICE CMNT-IMP: ABNORMAL
VALPROATE SERPL-MCNC: 3 UG/ML (ref 50–100)

## 2017-02-02 PROCEDURE — 80164 ASSAY DIPROPYLACETIC ACD TOT: CPT | Performed by: PSYCHIATRY & NEUROLOGY

## 2017-02-02 PROCEDURE — 36415 COLL VENOUS BLD VENIPUNCTURE: CPT | Performed by: PSYCHIATRY & NEUROLOGY

## 2017-02-02 PROCEDURE — 74011250637 HC RX REV CODE- 250/637: Performed by: INTERNAL MEDICINE

## 2017-02-02 PROCEDURE — 74011250637 HC RX REV CODE- 250/637: Performed by: PSYCHIATRY & NEUROLOGY

## 2017-02-02 PROCEDURE — 65220000003 HC RM SEMIPRIVATE PSYCH

## 2017-02-02 PROCEDURE — 82962 GLUCOSE BLOOD TEST: CPT

## 2017-02-02 RX ORDER — RISPERIDONE 1 MG/ML
1 SOLUTION ORAL 3 TIMES DAILY
Status: DISCONTINUED | OUTPATIENT
Start: 2017-02-02 | End: 2017-02-02

## 2017-02-02 RX ORDER — HALOPERIDOL 5 MG/ML
2 INJECTION INTRAMUSCULAR 3 TIMES DAILY
Status: DISCONTINUED | OUTPATIENT
Start: 2017-02-02 | End: 2017-02-10 | Stop reason: HOSPADM

## 2017-02-02 RX ORDER — HALOPERIDOL 5 MG/ML
1 INJECTION INTRAMUSCULAR 3 TIMES DAILY
Status: DISCONTINUED | OUTPATIENT
Start: 2017-02-02 | End: 2017-02-06

## 2017-02-02 RX ORDER — VALPROIC ACID 250 MG/5ML
500 SOLUTION ORAL 3 TIMES DAILY
Status: DISCONTINUED | OUTPATIENT
Start: 2017-02-02 | End: 2017-02-10 | Stop reason: HOSPADM

## 2017-02-02 RX ORDER — RISPERIDONE 1 MG/ML
1 SOLUTION ORAL 3 TIMES DAILY
Status: DISCONTINUED | OUTPATIENT
Start: 2017-02-02 | End: 2017-02-06

## 2017-02-02 RX ADMIN — METOPROLOL TARTRATE 25 MG: 25 TABLET ORAL at 08:25

## 2017-02-02 RX ADMIN — CLONAZEPAM 1 MG: 1 TABLET ORAL at 17:02

## 2017-02-02 RX ADMIN — ATORVASTATIN CALCIUM 40 MG: 40 TABLET, FILM COATED ORAL at 21:13

## 2017-02-02 RX ADMIN — Medication 1 MG: at 08:26

## 2017-02-02 RX ADMIN — VALPROIC ACID 500 MG: 250 SOLUTION ORAL at 17:02

## 2017-02-02 RX ADMIN — PANTOPRAZOLE SODIUM 40 MG: 40 TABLET, DELAYED RELEASE ORAL at 05:52

## 2017-02-02 RX ADMIN — ASPIRIN 325 MG ORAL TABLET 325 MG: 325 PILL ORAL at 08:25

## 2017-02-02 RX ADMIN — CLONAZEPAM 1 MG: 1 TABLET ORAL at 11:41

## 2017-02-02 RX ADMIN — METOPROLOL TARTRATE 25 MG: 25 TABLET ORAL at 21:13

## 2017-02-02 RX ADMIN — Medication 1 MG: at 11:41

## 2017-02-02 RX ADMIN — CLONAZEPAM 1 MG: 1 TABLET ORAL at 08:25

## 2017-02-02 RX ADMIN — Medication 1 MG: at 17:03

## 2017-02-02 RX ADMIN — VITAMIN D, TAB 1000IU (100/BT) 4000 UNITS: 25 TAB at 08:25

## 2017-02-02 RX ADMIN — VALPROIC ACID 500 MG: 250 SOLUTION ORAL at 08:26

## 2017-02-02 RX ADMIN — VALPROIC ACID 500 MG: 250 SOLUTION ORAL at 11:41

## 2017-02-02 NOTE — PROGRESS NOTES
Problem: Altered Thought Process (Adult/Pediatric)  Goal: *STG: Complies with medication therapy  Outcome: Progressing Towards Goal  Patient stayed for most of the evening in the room. She is labile and mood is irritable. Compliant with blood sugar check, medication and meals. Denies any pain or discomfort. Continue to monitor the patient and assess needs.

## 2017-02-02 NOTE — PROGRESS NOTES
Laboratory Monitoring for Valproic Acid    This patient is currently prescribed the following medication(s):   Current Facility-Administered Medications   Medication Dose Route Frequency    valproic acid (as sodium salt) (DEPAKENE) 250 mg/5 mL (5 mL) oral solution 500 mg  500 mg Oral TID    Or    haloperidol lactate (HALDOL) injection 2 mg +++COURT ORDERED MEDICATION FOR REFUSAL OF PO VALPROIC ACID+++  2 mg IntraMUSCular TID    risperiDONE (RisperDAL) 1 mg/mL oral solution soln 1 mg  1 mg Oral TID    Or    haloperidol lactate (HALDOL) injection 1 mg +++COURT ORDERED MEDICATION FOR REFUSAL OF PO RISPERIDONE+++  1 mg IntraMUSCular TID    clonazePAM (KlonoPIN) tablet 1 mg  1 mg Oral TID    metoprolol tartrate (LOPRESSOR) tablet 25 mg  25 mg Oral Q12H    aspirin (ASPIRIN) tablet 325 mg  325 mg Oral DAILY    cholecalciferol (VITAMIN D3) tablet 4,000 Units  4,000 Units Oral DAILY    pantoprazole (PROTONIX) tablet 40 mg  40 mg Oral ACB    atorvastatin (LIPITOR) tablet 40 mg  40 mg Oral QHS    insulin lispro (HUMALOG) injection   SubCUTAneous ACB&D       The following labs have been completed for monitoring of valproic acid:    Valproic Acid Serum Concentration  Lab Results   Component Value Date/Time    Valproic acid 3 02/02/2017 04:55 AM       Assessment/Plan:  Valproic acid level subtherapeutic at 3 mcg/mL, suspect patient has been cheeking her medications. Depakote ER changed to valproic acid oral solution 1500 mg/day.         Jill Neff, PharmD, BCPS  [phone]

## 2017-02-02 NOTE — BH NOTES
PSYCHIATRIC PROGRESS NOTE         Patient Name  Lauren Hutton   Date of Birth 1960   [facility] [id number]   Medical Record Number  [id number]      Age  49 y.o. PCP None   Admit date:  1/27/2017    Room Number  647/61  @ [facility]   Date of Service  2/2/2017          PSYCHOTHERAPY SESSION NOTE:  Length of psychotherapy session: 20 minutes    Main condition/diagnosis/issues treated during session today, 2/2/2017 : raeann, tx, psychosis, denial, racial slurrs    I employed Cognitive Behavioral therapy techniques, Reality-Oriented psychotherapy, as well as supportive psychotherapy in regards to various ongoing psychosocial stressors, including the following: pre-admission and current problems; housing issues; occupational issues; medical issues; and stress of hospitalization. Interpersonal relationship issues and psychodynamic conflicts explored. Attempts made to alleviate maladaptive patterns. Overall, patient is minially progressing    Treatment Plan Update (reviewed an updated 2/2/2017) : I will modify psychotherapy tx plan by implementing more stress management strategies, building upon cognitive behavioral techniques, increasing coping skills, as well as shoring up psychological defenses). An extended energy and skill set was needed to engage pt in psychotherapy due to some of the following: resistiveness, complexity, negativity, confrontational nature, hostile behaviors, and/or severe abnormalities in thought processes/psychosis resulting in the loss of expressive/receptive language communication skills. E & M PROGRESS NOTE:         HISTORY       CC:  \"nothing wrong\"  HISTORY OF PRESENT ILLNESS/INTERVAL HISTORY:  (reviewed/updated 2/2/2017). per initial evaluation: The patient, Lauren Hutton, is a 49 y.o.  WHITE OR   WHITE OR  female with a past psychiatric history significant for bipolar vs schizophrenia, who presents at this time with complaints of (and/or evidence of) the following emotional symptoms: psychotic behavior, paranoid behavior, raeann, psychosis and catatonia. Additional symptomatology include loud, hyperverbal, combative, demanding, entitled, anger outbursts, difficulty sleeping and hearing voices. The above symptoms have been present for a week. These symptoms are of severe severity. These symptoms are constant in nature. The patient's condition has been precipitated by psychosocial stressors. Patient's condition made worse by treatment noncompliance. UDS +negative; BAL=0. Pt given IM psych meds on adm due to severe combativeness. Cornelio Ct presents/reports/evidences the following emotional symptoms today, 2/2/2017:raeann and psychosis. The above symptoms have been present for weeks. These symptoms are of severe severity. The symptoms are constant in nature. Additional symptomatology and features include blocking/severe disorganization of thought processes, angry, labile, paranoid, bizarre behaviors, aud north--internally preoc, racial slurs, labile moods. SIDE EFFECTS: (reviewed/updated 2/2/2017)  None reported or admitted to. No noted toxicity with use of Depakote   ALLERGIES:(reviewed/updated 2/2/2017)  Allergies   Allergen Reactions    Augmentin [Amoxicillin-Pot Clavulanate] Rash    Codeine Nausea Only    Cogentin [Benztropine] Other (comments)     Makes her sick    Iodine Other (comments)    Macrodantin [Nitrofurantoin Macrocrystalline] Rash    Morphine Shortness of Breath    Mucinex [Guaifenesin] Shortness of Breath    Oxycodone Other (comments)    Prednisone Anxiety      MEDICATIONS PRIOR TO ADMISSION:(reviewed/updated 2/2/2017)  Prescriptions Prior to Admission   Medication Sig    metoprolol tartrate (LOPRESSOR) 25 mg tablet Take 0.5 Tabs by mouth every twelve (12) hours. Indications: HYPERTENSION    risperiDONE (RISPERDAL) 2 mg tablet Take 1 Tab by mouth two (2) times a day.  Indications: BIPOLAR DISORDER IN REMISSION, SCHIZOPHRENIA    DULoxetine (CYMBALTA) 30 mg capsule Take 1 Cap by mouth nightly. Indications: ANXIETY WITH DEPRESSION, DEPRESSION    DULoxetine (CYMBALTA) 60 mg capsule Take 1 Cap by mouth daily. Indications: ANXIETY WITH DEPRESSION, DEPRESSION    aspirin (ASPIRIN) 325 mg tablet Take 325 mg by mouth daily. Indications: MYOCARDIAL INFARCTION PREVENTION    hydrOXYzine (ATARAX) 50 mg tablet Take 50 mg by mouth every six (6) hours as needed for Anxiety. Indications: ANXIETY    melatonin 3 mg tablet Take 3 mg by mouth nightly. Indications: INSOMNIA    omeprazole (PRILOSEC) 20 mg capsule Take 20 mg by mouth daily. Indications: HEARTBURN    simvastatin (ZOCOR) 40 mg tablet Take 40 mg by mouth nightly. Indications: HYPERCHOLESTEROLEMIA    cholecalciferol, vitamin D3, 2,000 unit tab Take 8,000 Int'l Units by mouth daily. Indications: PREVENTION OF VITAMIN D DEFICIENCY    magnesium oxide (MAG-OX) 400 mg tablet Take 400 mg by mouth daily. Indications: HYPOMAGNESEMIA      PAST MEDICAL HISTORY: Past medical history from the initial psychiatric evaluation has been reviewed (reviewed/updated 2/2/2017) with no additional updates (I asked patient and no additional past medical history provided). Past Medical History   Diagnosis Date    Anxiety     Asthma     Bipolar affective disorder ([address]     Chronic back pain     CVA (cerebral vascular accident) ([address]     Diabetes mellitus, type 2 ([address]     Dyslipidemia     Fibromyalgia     GERD (gastroesophageal reflux disease)     Hypertension     Schizophrenia ([address]      Past Surgical History   Procedure Laterality Date    Hx knee arthroscopy      Hx bunionectomy      Hx cholecystectomy      Hx tubal ligation      Hx hysterectomy      Hx other surgical       orbit blow out repair.       SOCIAL HISTORY: Social history from the initial psychiatric evaluation has been reviewed (reviewed/updated 2/2/2017) with no additional updates (I asked patient and no additional social history provided). Social History     Social History    Marital status: SINGLE     Spouse name: N/A    Number of children: N/A    Years of education: N/A     Occupational History    Not on file. Social History Main Topics    Smoking status: Current Every Day Smoker     Types: Cigarettes    Smokeless tobacco: Not on file    Alcohol use Not on file    Drug use: Yes     Special: Opiates    Sexual activity: Not on file     Other Topics Concern    Not on file     Social History Narrative    [place] . 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 [employer]. HS diploma. Has one son, age 39. FAMILY HISTORY: Family history from the initial psychiatric evaluation has been reviewed (reviewed/updated 2/2/2017) with no additional updates (I asked patient and no additional family history provided). History reviewed. No pertinent family history. REVIEW OF SYSTEMS: (reviewed/updated 2/2/2017)  Appetite:improved   Sleep: improved   All other Review of Systems: Respiratory ROS: no cough, shortness of breath, or wheezing  Cardiovascular ROS: no chest pain or dyspnea on exertion  Gastrointestinal ROS: no abdominal pain, change in bowel habits, or black or bloody stools  Neurological ROS: no TIA or stroke symptoms         [facility] ([facility]):    [facility] FINDINGS ARE WITHIN NORMAL LIMITS (WNL) UNLESS OTHERWISE STATED BELOW. ( ALL OF THE BELOW CATEGORIES OF THE [facility] HAVE BEEN REVIEWED (reviewed 2/2/2017) AND UPDATED AS DEEMED APPROPRIATE )  General Presentation disheveled and overweight, evasive, guarded, uncooperative and vague   Orientation disorganized   Vital Signs  See below (reviewed 2/2/2017); Vital Signs (BP, Pulse, & Temp) are within normal limits if not listed below.    Gait and Station Stable/steady, no ataxia   Musculoskeletal System No extrapyramidal symptoms (EPS); no abnormal muscular movements or Tardive Dyskinesia (TD); muscle strength and tone are within normal limits   Language No aphasia or dysarthria   Speech:  hypoverbal and monotone, vulgar, racial slurs   Thought Processes llogical; slow rate of thoughts; poor abstract reasoning/computation   Thought Associations blocked  and goal directed   Thought Content paranoid delusions and free of hallucinations, internally preoc   Suicidal Ideations no plan , no intention and none   Homicidal Ideations none   Mood:  angry, hostile , irritable and labile    Affect:  Irritable, angry/hostile demeanor, and odd demeanor   Memory recent  impaired   Memory remote:  fair   Concentration/Attention:  fair   Fund of Knowledge below average   Insight:  limited   Reliability fair   Judgment:  limited          VITALS:     Patient Vitals for the past 24 hrs:   Temp Pulse Resp BP   02/02/17 0600 97.2 °F (36.2 °C) 72 16 121/78   02/01/17 2121 - 84 - 119/76     Wt Readings from Last 3 Encounters:   01/31/17 91.7 kg (202 lb 2 oz)   07/07/16 96.8 kg (213 lb 6.4 oz)     Temp Readings from Last 3 Encounters:   02/02/17 97.2 °F (36.2 °C)   07/08/16 98.2 °F (36.8 °C)     BP Readings from Last 3 Encounters:   02/02/17 121/78   07/08/16 119/60     Pulse Readings from Last 3 Encounters:   02/02/17 72   07/08/16 94            DATA     LABORATORY DATA:(reviewed/updated 2/2/2017)  Recent Results (from the past 24 hour(s))   VALPROIC ACID    Collection Time: 02/02/17  4:55 AM   Result Value Ref Range    Valproic acid 3 (L) 50 - 100 ug/ml   GLUCOSE, POC    Collection Time: 02/02/17  4:35 PM   Result Value Ref Range    Glucose (POC) 114 (H) 65 - 100 mg/dL    Performed by Meena Reyes      Lab Results   Component Value Date/Time    Valproic acid 3 02/02/2017 04:55 AM     No results found for: NORBERTO, ANTONIO, CHANTAL   RADIOLOGY REPORTS:(reviewed/updated 2/2/2017)  No results found.        MEDICATIONS     ALL MEDICATIONS:   Current Facility-Administered Medications   Medication Dose Route Frequency  valproic acid (as sodium salt) (DEPAKENE) 250 mg/5 mL (5 mL) oral solution 500 mg  500 mg Oral TID    Or    haloperidol lactate (HALDOL) injection 2 mg +++COURT ORDERED MEDICATION FOR REFUSAL OF PO VALPROIC ACID+++  2 mg IntraMUSCular TID    risperiDONE (RisperDAL) 1 mg/mL oral solution soln 1 mg  1 mg Oral TID    Or    haloperidol lactate (HALDOL) injection 1 mg +++COURT ORDERED MEDICATION FOR REFUSAL OF PO RISPERIDONE+++  1 mg IntraMUSCular TID    clonazePAM (KlonoPIN) tablet 1 mg  1 mg Oral TID    metoprolol tartrate (LOPRESSOR) tablet 25 mg  25 mg Oral Q12H    cloNIDine HCl (CATAPRES) tablet 0.2 mg  0.2 mg Oral Q6H PRN    aspirin (ASPIRIN) tablet 325 mg  325 mg Oral DAILY    cholecalciferol (VITAMIN D3) tablet 4,000 Units  4,000 Units Oral DAILY    pantoprazole (PROTONIX) tablet 40 mg  40 mg Oral ACB    atorvastatin (LIPITOR) tablet 40 mg  40 mg Oral QHS    insulin lispro (HUMALOG) injection   SubCUTAneous ACB&D    glucose chewable tablet 16 g  4 Tab Oral PRN    dextrose (D50W) injection syrg 12.5-25 g  12.5-25 g IntraVENous PRN    glucagon (GLUCAGEN) injection 1 mg  1 mg IntraMUSCular PRN    ziprasidone (GEODON) 20 mg in sterile water (preservative free) 1 mL injection  20 mg IntraMUSCular BID PRN    OLANZapine (ZyPREXA) tablet 5 mg  5 mg Oral Q6H PRN    LORazepam (ATIVAN) injection 2 mg  2 mg IntraMUSCular Q4H PRN    LORazepam (ATIVAN) tablet 1 mg  1 mg Oral Q4H PRN    zolpidem (AMBIEN) tablet 10 mg  10 mg Oral QHS PRN    acetaminophen (TYLENOL) tablet 650 mg  650 mg Oral Q4H PRN    ibuprofen (MOTRIN) tablet 400 mg  400 mg Oral Q8H PRN    magnesium hydroxide (MILK OF MAGNESIA) 400 mg/5 mL oral suspension 30 mL  30 mL Oral DAILY PRN    nicotine (NICODERM CQ) 21 mg/24 hr patch 1 Patch  1 Patch TransDERmal DAILY PRN      SCHEDULED MEDICATIONS:   Current Facility-Administered Medications   Medication Dose Route Frequency    valproic acid (as sodium salt) (DEPAKENE) 250 mg/5 mL (5 mL) oral solution 500 mg  500 mg Oral TID    Or    haloperidol lactate (HALDOL) injection 2 mg +++COURT ORDERED MEDICATION FOR REFUSAL OF PO VALPROIC ACID+++  2 mg IntraMUSCular TID    risperiDONE (RisperDAL) 1 mg/mL oral solution soln 1 mg  1 mg Oral TID    Or    haloperidol lactate (HALDOL) injection 1 mg +++COURT ORDERED MEDICATION FOR REFUSAL OF PO RISPERIDONE+++  1 mg IntraMUSCular TID    clonazePAM (KlonoPIN) tablet 1 mg  1 mg Oral TID    metoprolol tartrate (LOPRESSOR) tablet 25 mg  25 mg Oral Q12H    aspirin (ASPIRIN) tablet 325 mg  325 mg Oral DAILY    cholecalciferol (VITAMIN D3) tablet 4,000 Units  4,000 Units Oral DAILY    pantoprazole (PROTONIX) tablet 40 mg  40 mg Oral ACB    atorvastatin (LIPITOR) tablet 40 mg  40 mg Oral QHS    insulin lispro (HUMALOG) injection   SubCUTAneous ACB&D          ASSESSMENT & PLAN     DIAGNOSES REQUIRING ACTIVE TREATMENT AND MONITORING: (reviewed/updated 2/2/2017)  Patient Active Hospital Problem List:     schizoaffective dis vs schizophrenia, doubt bipolar dis  Assessment: wide differential. Will cont to secure old recs  Minimally better, still extremely psychotic and labile. Discovered to be cheeking her meds, dep level=0. Prognosis is extremely poor, apparently pt with a very poor baseline level of psych functioning with numerous long term i/p hospitalization. She does not take meds on an o/p basis contributing to her poor baseline. Collateral info from "[facility] [place] [address] PACT Team (phone: [phone]; on call phone: [phone]; fax: [phone]) to provide a treatment update and obtain additional collateral information. Clinician shared that pt was recently opened to their PACT team on 1/9/17 after 5 repeated psychiatric hospitalizations since July 2016.  Clinician shared pt was hospitalized at the following facilities: [address] from 7/4/16-7/8/16 and [facility], [place] from 7/9/16-7/21/16, 9/12/16-11/4/16, 11/9/16-11/14/16, and most recent hospitalization was for almost two months from 11/15/16-1/4/17. Clinician stated PACT team has had limited contact with pt since she was opened to their team. Clinician shared that pt did not answer her door on several occasions when PACT tried to visit. Clinician stated that pt was referred to PACT for her inability to manage her own medications in the community. \"  Plan: cont to adjust psych meds. Started  mood stabilizer and AP along with benzos---titrate. I will monitor blood levels (Depakote---a drug with a narrow therapeutic index= NTI) and associated labs for drug therapy implemented that require intense monitoring for toxicity as deemed appropriate base on current medication side effects and pharmacodynamically determined drug 1/2 lives. Essential hypertension (7/6/2016)  Assessment: still elevated  Plan: cont to adjust bp meds    Catatonic state ([facility] (1/27/2017)  Assessment:  now resolved  Plan: tx underlying psych etiology. Monitor for malignant catatonia, dc APs if vitals escalate      In summary, Payton Pierre, is a 49 y.o.  female who presents with a severe exacerbation of the principal diagnosis of Bipolar affective, mixed, sev w/ psych [facility])  Patient's condition is worsening/not improving/not stable . Patient requires continued inpatient hospitalization for further stabilization, safety monitoring and medication management. I will continue to coordinate the provision of individual, milieu, occupational, group, and substance abuse therapies to address target symptoms/diagnoses as deemed appropriate for the individual patient. A coordinated, multidisplinary treatment team round was conducted with the patient (this team consists of the nurse, psychiatric unit pharmcist,  and writer).      Complete current electronic health record for patient has been reviewed today including consultant notes, ancillary staff notes, nurses and psychiatric tech notes. Suicide risk assessment completed and patient deemed to be of low risk for suicide at this time. The following regarding medications was addressed during rounds with patient:   the risks and benefits of the proposed medication. The patient was given the opportunity to ask questions. Informed consent given to the use of the above medications. Will continue to adjust psychiatric and non-psychiatric medications (see above \"medication\" section and orders section for details) as deemed appropriate & based upon diagnoses and response to treatment. I will continue to order blood tests/labs and diagnostic tests as deemed appropriate and review results as they become available (see orders for details and above listed lab/test results). I will order psychiatric records from previous [facility] hospitals to further elucidate the nature of patient's psychopathology and review once available. I will gather additional collateral information from friends, family and o/p treatment team to further elucidate the nature of patient's psychopathology and baselline level of psychiatric functioning. I certify that this patient's inpatient psychiatric hospital services furnished since the previous certification were, and continue to be, required for treatment that could reasonably be expected to improve the patient's condition, or for diagnostic study, and that the patient continues to need, on a daily basis, active treatment furnished directly by or requiring the supervision of inpatient psychiatric facility personnel. In addition, the hospital records show that services furnished were intensive treatment services, admission or related services, or equivalent services.     EXPECTED DISCHARGE DATE/DAY: TBD     DISPOSITION: Home       Signed By:   Zhou Kim MD  2/2/2017

## 2017-02-02 NOTE — PROGRESS NOTES
Problem: Altered Thought Process (Adult/Pediatric)  Goal: *STG: Complies with medication therapy  Outcome: Progressing Towards Goal  Patient continues to isolate to her room, visible on the unit at meal and medication time. She is labile and irritable when approached her. Compliant with medication and meals. Denies any pain or discomfort. Continue to monitor the patient and assess needs.

## 2017-02-02 NOTE — BH NOTES
REFLECTIONS GROUP THERAPY PROGRESS NOTE    The patient Marianne Bui is participating in Reflections Group Therapy. Group time: 30 minutes    Personal goal for participation: Share with group about feelings and concerns throughout the course of the day. Goal orientation: personal    Group therapy participation: active    Therapeutic interventions reviewed and discussed: Yes    Impression of participation:   Positive input.     Feli Tellez  2/1/2017

## 2017-02-02 NOTE — BH NOTES
GROUP THERAPY PROGRESS NOTE    The patient Ada Christie is participating in Community Group. Group time: 30 minutes    Personal goal for participation: to orient the patient to the unit.     Goal orientation: successful adoption of unit rules    Group therapy participation: minimal    Therapeutic interventions reviewed and discussed: Yes    Impression of participation:  segundo Rush  2/2/2017 1:58 PM

## 2017-02-02 NOTE — BH NOTES
Remained in bed resting quietly x 7hrs , monitored q15 minutes.   Pt had lab work done tolerated well

## 2017-02-02 NOTE — BH NOTES
Pt spent time in the day room with no peer interaction. Pt continues to be preoccupied and selectively mute at times. Pt will continue to be monitored for safety.

## 2017-02-02 NOTE — BH NOTES
SOCIAL WORK NOTE:  Pt did not attend processing group. She entered group midway and remained standing at end of table while pealing an orange. Pt used pieces of the orange peal to cover areas on the handout. Pt answered \"yes\" when asked if she wanted to increasing laughter. She left the day room immediately after.       HARLEY Coto, Supervisee in Social Work

## 2017-02-03 LAB
GLUCOSE BLD STRIP.AUTO-MCNC: 101 MG/DL (ref 65–100)
GLUCOSE BLD STRIP.AUTO-MCNC: 114 MG/DL (ref 65–100)
GLUCOSE BLD STRIP.AUTO-MCNC: 91 MG/DL (ref 65–100)
SERVICE CMNT-IMP: ABNORMAL
SERVICE CMNT-IMP: ABNORMAL
SERVICE CMNT-IMP: NORMAL

## 2017-02-03 PROCEDURE — 82962 GLUCOSE BLOOD TEST: CPT

## 2017-02-03 PROCEDURE — 74011250637 HC RX REV CODE- 250/637: Performed by: PSYCHIATRY & NEUROLOGY

## 2017-02-03 PROCEDURE — 74011250637 HC RX REV CODE- 250/637: Performed by: INTERNAL MEDICINE

## 2017-02-03 PROCEDURE — 65220000003 HC RM SEMIPRIVATE PSYCH

## 2017-02-03 RX ADMIN — CLONAZEPAM 1 MG: 1 TABLET ORAL at 16:21

## 2017-02-03 RX ADMIN — ASPIRIN 325 MG ORAL TABLET 325 MG: 325 PILL ORAL at 08:06

## 2017-02-03 RX ADMIN — CLONAZEPAM 1 MG: 1 TABLET ORAL at 08:06

## 2017-02-03 RX ADMIN — PANTOPRAZOLE SODIUM 40 MG: 40 TABLET, DELAYED RELEASE ORAL at 06:12

## 2017-02-03 RX ADMIN — ATORVASTATIN CALCIUM 40 MG: 40 TABLET, FILM COATED ORAL at 21:03

## 2017-02-03 RX ADMIN — VALPROIC ACID 500 MG: 250 SOLUTION ORAL at 08:06

## 2017-02-03 RX ADMIN — METOPROLOL TARTRATE 25 MG: 25 TABLET ORAL at 21:03

## 2017-02-03 RX ADMIN — VALPROIC ACID 500 MG: 250 SOLUTION ORAL at 16:21

## 2017-02-03 RX ADMIN — METOPROLOL TARTRATE 25 MG: 25 TABLET ORAL at 08:41

## 2017-02-03 RX ADMIN — CLONAZEPAM 1 MG: 1 TABLET ORAL at 12:11

## 2017-02-03 RX ADMIN — VITAMIN D, TAB 1000IU (100/BT) 4000 UNITS: 25 TAB at 08:05

## 2017-02-03 RX ADMIN — VALPROIC ACID 500 MG: 250 SOLUTION ORAL at 12:11

## 2017-02-03 RX ADMIN — Medication 1 MG: at 12:11

## 2017-02-03 RX ADMIN — Medication 1 MG: at 16:21

## 2017-02-03 RX ADMIN — Medication 1 MG: at 08:07

## 2017-02-03 NOTE — BH NOTES
PSYCHIATRIC PROGRESS NOTE         Patient Name  Villa Mckeon   Date of Birth 1960   [facility] [id number]   Medical Record Number  [id number]      Age  49 y.o. PCP None   Admit date:  1/27/2017    Room Number  673/89  @ [facility]   Date of Service  2/3/2017          PSYCHOTHERAPY SESSION NOTE:  Length of psychotherapy session: 20 minutes    Main condition/diagnosis/issues treated during session today, 2/3/2017 : raeann, tx, psychosis, denial, racial slurrs    I employed Cognitive Behavioral therapy techniques, Reality-Oriented psychotherapy, as well as supportive psychotherapy in regards to various ongoing psychosocial stressors, including the following: pre-admission and current problems; housing issues; occupational issues; medical issues; and stress of hospitalization. Interpersonal relationship issues and psychodynamic conflicts explored. Attempts made to alleviate maladaptive patterns. Overall, patient is minially progressing    Treatment Plan Update (reviewed an updated 2/3/2017) : I will modify psychotherapy tx plan by implementing more stress management strategies, building upon cognitive behavioral techniques, increasing coping skills, as well as shoring up psychological defenses). An extended energy and skill set was needed to engage pt in psychotherapy due to some of the following: resistiveness, complexity, negativity, confrontational nature, hostile behaviors, and/or severe abnormalities in thought processes/psychosis resulting in the loss of expressive/receptive language communication skills. E & M PROGRESS NOTE:         HISTORY       CC:  \"i cant open the door for everyone\"  HISTORY OF PRESENT ILLNESS/INTERVAL HISTORY:  (reviewed/updated 2/3/2017). per initial evaluation: The patient, Villa Mckeon, is a 49 y.o.  WHITE OR   WHITE OR  female with a past psychiatric history significant for bipolar vs schizophrenia, who presents at this time with complaints of (and/or evidence of) the following emotional symptoms: psychotic behavior, paranoid behavior, raeann, psychosis and catatonia. Additional symptomatology include loud, hyperverbal, combative, demanding, entitled, anger outbursts, difficulty sleeping and hearing voices. The above symptoms have been present for a week. These symptoms are of severe severity. These symptoms are constant in nature. The patient's condition has been precipitated by psychosocial stressors. Patient's condition made worse by treatment noncompliance. UDS +negative; BAL=0. Pt given IM psych meds on adm due to severe combativeness. Dania Lynne presents/reports/evidences the following emotional symptoms today, 2/3/2017:raeann and psychosis. The above symptoms have been present for weeks. These symptoms are of severe severity. The symptoms are constant in nature. Additional symptomatology and features include blocking/severe disorganization of thought processes, angry, labile, paranoid, bizarre behaviors, aud north--internally preoc, racial slurs, labile moods. SIDE EFFECTS: (reviewed/updated 2/3/2017)  None reported or admitted to. No noted toxicity with use of Depakote   ALLERGIES:(reviewed/updated 2/3/2017)  Allergies   Allergen Reactions    Augmentin [Amoxicillin-Pot Clavulanate] Rash    Codeine Nausea Only    Cogentin [Benztropine] Other (comments)     Makes her sick    Iodine Other (comments)    Macrodantin [Nitrofurantoin Macrocrystalline] Rash    Morphine Shortness of Breath    Mucinex [Guaifenesin] Shortness of Breath    Oxycodone Other (comments)    Prednisone Anxiety      MEDICATIONS PRIOR TO ADMISSION:(reviewed/updated 2/3/2017)  Prescriptions Prior to Admission   Medication Sig    metoprolol tartrate (LOPRESSOR) 25 mg tablet Take 0.5 Tabs by mouth every twelve (12) hours. Indications: HYPERTENSION    risperiDONE (RISPERDAL) 2 mg tablet Take 1 Tab by mouth two (2) times a day. Indications: BIPOLAR DISORDER IN REMISSION, SCHIZOPHRENIA    DULoxetine (CYMBALTA) 30 mg capsule Take 1 Cap by mouth nightly. Indications: ANXIETY WITH DEPRESSION, DEPRESSION    DULoxetine (CYMBALTA) 60 mg capsule Take 1 Cap by mouth daily. Indications: ANXIETY WITH DEPRESSION, DEPRESSION    aspirin (ASPIRIN) 325 mg tablet Take 325 mg by mouth daily. Indications: MYOCARDIAL INFARCTION PREVENTION    hydrOXYzine (ATARAX) 50 mg tablet Take 50 mg by mouth every six (6) hours as needed for Anxiety. Indications: ANXIETY    melatonin 3 mg tablet Take 3 mg by mouth nightly. Indications: INSOMNIA    omeprazole (PRILOSEC) 20 mg capsule Take 20 mg by mouth daily. Indications: HEARTBURN    simvastatin (ZOCOR) 40 mg tablet Take 40 mg by mouth nightly. Indications: HYPERCHOLESTEROLEMIA    cholecalciferol, vitamin D3, 2,000 unit tab Take 8,000 Int'l Units by mouth daily. Indications: PREVENTION OF VITAMIN D DEFICIENCY    magnesium oxide (MAG-OX) 400 mg tablet Take 400 mg by mouth daily. Indications: HYPOMAGNESEMIA      PAST MEDICAL HISTORY: Past medical history from the initial psychiatric evaluation has been reviewed (reviewed/updated 2/3/2017) with no additional updates (I asked patient and no additional past medical history provided). Past Medical History   Diagnosis Date    Anxiety     Asthma     Bipolar affective disorder ([address]     Chronic back pain     CVA (cerebral vascular accident) ([address]     Diabetes mellitus, type 2 ([address]     Dyslipidemia     Fibromyalgia     GERD (gastroesophageal reflux disease)     Hypertension     Schizophrenia ([address]      Past Surgical History   Procedure Laterality Date    Hx knee arthroscopy      Hx bunionectomy      Hx cholecystectomy      Hx tubal ligation      Hx hysterectomy      Hx other surgical       orbit blow out repair.       SOCIAL HISTORY: Social history from the initial psychiatric evaluation has been reviewed (reviewed/updated 2/3/2017) with no additional updates (I asked patient and no additional social history provided). Social History     Social History    Marital status: SINGLE     Spouse name: N/A    Number of children: N/A    Years of education: N/A     Occupational History    Not on file. Social History Main Topics    Smoking status: Current Every Day Smoker     Types: Cigarettes    Smokeless tobacco: Not on file    Alcohol use Not on file    Drug use: Yes     Special: Opiates    Sexual activity: Not on file     Other Topics Concern    Not on file     Social History Narrative    [place], [place] . 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 [employer]. HS diploma. Has one son, age 39. FAMILY HISTORY: Family history from the initial psychiatric evaluation has been reviewed (reviewed/updated 2/3/2017) with no additional updates (I asked patient and no additional family history provided). History reviewed. No pertinent family history. REVIEW OF SYSTEMS: (reviewed/updated 2/3/2017)  Appetite:improved   Sleep: improved   All other Review of Systems: Respiratory ROS: no cough, shortness of breath, or wheezing  Cardiovascular ROS: no chest pain or dyspnea on exertion  Gastrointestinal ROS: no abdominal pain, change in bowel habits, or black or bloody stools  Neurological ROS: no TIA or stroke symptoms         [facility] (MSE):    MSE FINDINGS ARE WITHIN NORMAL LIMITS (WNL) UNLESS OTHERWISE STATED BELOW. ( ALL OF THE BELOW CATEGORIES OF THE MSE HAVE BEEN REVIEWED (reviewed 2/3/2017) AND UPDATED AS DEEMED APPROPRIATE )  General Presentation disheveled and overweight, evasive, guarded, uncooperative and vague   Orientation disorganized   Vital Signs  See below (reviewed 2/3/2017); Vital Signs (BP, Pulse, & Temp) are within normal limits if not listed below.    Gait and Station Stable/steady, no ataxia   Musculoskeletal System No extrapyramidal symptoms (EPS); no abnormal muscular movements or Tardive Dyskinesia (TD); muscle strength and tone are within normal limits   Language No aphasia or dysarthria   Speech:  hypoverbal and monotone, vulgar   Thought Processes llogical; slow rate of thoughts; poor abstract reasoning/computation   Thought Associations blocked  and goal directed   Thought Content Paranoid, aud north--- internally preoc   Suicidal Ideations no plan , no intention and none   Homicidal Ideations none   Mood:  angry, hostile , irritable and labile    Affect:  Irritable, angry/hostile demeanor, labile, and odd demeanor   Memory recent  impaired   Memory remote:  fair   Concentration/Attention:  fair   Fund of Knowledge below average   Insight:  limited   Reliability fair   Judgment:  limited          VITALS:     Patient Vitals for the past 24 hrs:   Temp Pulse Resp BP   02/03/17 1500 97.3 °F (36.3 °C) 88 18 119/70   02/03/17 0841 - (!) 111 - 121/74   02/03/17 0700 98.5 °F (36.9 °C) (!) 114 20 123/72   02/02/17 2113 - 72 - 121/78     Wt Readings from Last 3 Encounters:   01/31/17 91.7 kg (202 lb 2 oz)   07/07/16 96.8 kg (213 lb 6.4 oz)     Temp Readings from Last 3 Encounters:   02/03/17 97.3 °F (36.3 °C)   07/08/16 98.2 °F (36.8 °C)     BP Readings from Last 3 Encounters:   02/03/17 119/70   07/08/16 119/60     Pulse Readings from Last 3 Encounters:   02/03/17 88   07/08/16 94            DATA     LABORATORY DATA:(reviewed/updated 2/3/2017)  Recent Results (from the past 24 hour(s))   GLUCOSE, POC    Collection Time: 02/02/17  4:35 PM   Result Value Ref Range    Glucose (POC) 114 (H) 65 - 100 mg/dL    Performed by Simmie Babinski, POC    Collection Time: 02/03/17  8:05 AM   Result Value Ref Range    Glucose (POC) 101 (H) 65 - 100 mg/dL    Performed by Laney Merino    GLUCOSE, POC    Collection Time: 02/03/17 11:54 AM   Result Value Ref Range    Glucose (POC) 91 65 - 100 mg/dL    Performed by Belen Coreas, POC    Collection Time: 02/03/17  4:20 PM   Result Value Ref Range    Glucose (POC) 114 (H) 65 - 100 mg/dL    Performed by [facility]      Lab Results   Component Value Date/Time    Valproic acid 3 02/02/2017 04:55 AM     No results found for: NORBERTO, ANTONIO, CHANTAL   RADIOLOGY REPORTS:(reviewed/updated 2/3/2017)  No results found.        MEDICATIONS     ALL MEDICATIONS:   Current Facility-Administered Medications   Medication Dose Route Frequency    valproic acid (as sodium salt) (DEPAKENE) 250 mg/5 mL (5 mL) oral solution 500 mg  500 mg Oral TID    Or    haloperidol lactate (HALDOL) injection 2 mg +++COURT ORDERED MEDICATION FOR REFUSAL OF PO VALPROIC ACID+++  2 mg IntraMUSCular TID    risperiDONE (RisperDAL) 1 mg/mL oral solution soln 1 mg  1 mg Oral TID    Or    haloperidol lactate (HALDOL) injection 1 mg +++COURT ORDERED MEDICATION FOR REFUSAL OF PO RISPERIDONE+++  1 mg IntraMUSCular TID    clonazePAM (KlonoPIN) tablet 1 mg  1 mg Oral TID    metoprolol tartrate (LOPRESSOR) tablet 25 mg  25 mg Oral Q12H    cloNIDine HCl (CATAPRES) tablet 0.2 mg  0.2 mg Oral Q6H PRN    aspirin (ASPIRIN) tablet 325 mg  325 mg Oral DAILY    cholecalciferol (VITAMIN D3) tablet 4,000 Units  4,000 Units Oral DAILY    pantoprazole (PROTONIX) tablet 40 mg  40 mg Oral ACB    atorvastatin (LIPITOR) tablet 40 mg  40 mg Oral QHS    insulin lispro (HUMALOG) injection   SubCUTAneous ACB&D    glucose chewable tablet 16 g  4 Tab Oral PRN    dextrose (D50W) injection syrg 12.5-25 g  12.5-25 g IntraVENous PRN    glucagon (GLUCAGEN) injection 1 mg  1 mg IntraMUSCular PRN    ziprasidone (GEODON) 20 mg in sterile water (preservative free) 1 mL injection  20 mg IntraMUSCular BID PRN    OLANZapine (ZyPREXA) tablet 5 mg  5 mg Oral Q6H PRN    LORazepam (ATIVAN) injection 2 mg  2 mg IntraMUSCular Q4H PRN    LORazepam (ATIVAN) tablet 1 mg  1 mg Oral Q4H PRN    zolpidem (AMBIEN) tablet 10 mg  10 mg Oral QHS PRN    acetaminophen (TYLENOL) tablet 650 mg  650 mg Oral Q4H PRN    ibuprofen (MOTRIN) tablet 400 mg  400 mg Oral Q8H PRN    magnesium hydroxide (MILK OF MAGNESIA) 400 mg/5 mL oral suspension 30 mL  30 mL Oral DAILY PRN    nicotine (NICODERM CQ) 21 mg/24 hr patch 1 Patch  1 Patch TransDERmal DAILY PRN      SCHEDULED MEDICATIONS:   Current Facility-Administered Medications   Medication Dose Route Frequency    valproic acid (as sodium salt) (DEPAKENE) 250 mg/5 mL (5 mL) oral solution 500 mg  500 mg Oral TID    Or    haloperidol lactate (HALDOL) injection 2 mg +++COURT ORDERED MEDICATION FOR REFUSAL OF PO VALPROIC ACID+++  2 mg IntraMUSCular TID    risperiDONE (RisperDAL) 1 mg/mL oral solution soln 1 mg  1 mg Oral TID    Or    haloperidol lactate (HALDOL) injection 1 mg +++COURT ORDERED MEDICATION FOR REFUSAL OF PO RISPERIDONE+++  1 mg IntraMUSCular TID    clonazePAM (KlonoPIN) tablet 1 mg  1 mg Oral TID    metoprolol tartrate (LOPRESSOR) tablet 25 mg  25 mg Oral Q12H    aspirin (ASPIRIN) tablet 325 mg  325 mg Oral DAILY    cholecalciferol (VITAMIN D3) tablet 4,000 Units  4,000 Units Oral DAILY    pantoprazole (PROTONIX) tablet 40 mg  40 mg Oral ACB    atorvastatin (LIPITOR) tablet 40 mg  40 mg Oral QHS    insulin lispro (HUMALOG) injection   SubCUTAneous ACB&D          ASSESSMENT & PLAN     DIAGNOSES REQUIRING ACTIVE TREATMENT AND MONITORING: (reviewed/updated 2/3/2017)  Patient Active Hospital Problem List:     schizoaffective dis vs schizophrenia, doubt bipolar dis  Assessment:  Will cont to secure old recs  Minimally better, still psychotic and labile. Discovered to be cheeking her meds the other day, dep level=0. Prognosis is extremely poor, apparently pt with a very poor baseline level of psych functioning with numerous long term i/p hospitalization. She does not take meds on an o/p basis contributing to her poor baseline.     [facility] info [facility] (phone: [phone]; on call phone: [phone]; fax: [phone]) to provide a treatment update and obtain additional collateral information. Clinician shared that pt was recently opened to their PACT team on 1/9/17 after 5 repeated psychiatric hospitalizations since July 2016. Clinician shared pt was hospitalized at the following facilities: [facility] from 7/4/16-7/8/16 and [address] [place] [address] from 7/9/16-7/21/16, 9/12/16-11/4/16, 11/9/16-11/14/16, and most recent hospitalization was for almost two months from 11/15/16-1/4/17. Clinician stated PACT team has had limited contact with pt since she was opened to their team. Clinician shared that pt did not answer her door on several occasions when PACT tried to visit. Clinician stated that pt was referred to PACT for her inability to manage her own medications in the community. \"  Plan: cont to adjust psych meds. Started mood stabilizer and AP along with benzos---titrate. I will monitor blood levels (Depakote---a drug with a narrow therapeutic index= NTI) and associated labs for drug therapy implemented that require intense monitoring for toxicity as deemed appropriate base on current medication side effects and pharmacodynamically determined drug 1/2 lives. Essential hypertension (7/6/2016)  Assessment: still elevated  Plan: cont to adjust bp meds    Catatonic state ([address] (1/27/2017)  Assessment:  now resolved  Plan: tx underlying psych etiology. Monitor for malignant catatonia, dc APs if vitals escalate      In summary, Inocencia Prince, is a 49 y.o.  female who presents with a severe exacerbation of the principal diagnosis of Bipolar affective, mixed, sev w/ psych [facility])  Patient's condition is worsening/not improving/not stable . Patient requires continued inpatient hospitalization for further stabilization, safety monitoring and medication management.   I will continue to coordinate the provision of individual, milieu, occupational, group, and substance abuse therapies to address target symptoms/diagnoses as deemed appropriate for the individual patient. A coordinated, multidisplinary treatment team round was conducted with the patient (this team consists of the nurse, psychiatric unit pharmcist,  and writer). Complete current electronic health record for patient has been reviewed today including consultant notes, ancillary staff notes, nurses and psychiatric tech notes. Suicide risk assessment completed and patient deemed to be of low risk for suicide at this time. The following regarding medications was addressed during rounds with patient:   the risks and benefits of the proposed medication. The patient was given the opportunity to ask questions. Informed consent given to the use of the above medications. Will continue to adjust psychiatric and non-psychiatric medications (see above \"medication\" section and orders section for details) as deemed appropriate & based upon diagnoses and response to treatment. I will continue to order blood tests/labs and diagnostic tests as deemed appropriate and review results as they become available (see orders for details and above listed lab/test results). I will order psychiatric records from previous [facility] hospitals to further elucidate the nature of patient's psychopathology and review once available. I will gather additional collateral information from friends, family and o/p treatment team to further elucidate the nature of patient's psychopathology and baselline level of psychiatric functioning.          I certify that this patient's inpatient psychiatric hospital services furnished since the previous certification were, and continue to be, required for treatment that could reasonably be expected to improve the patient's condition, or for diagnostic study, and that the patient continues to need, on a daily basis, active treatment furnished directly by or requiring the supervision of inpatient psychiatric facility personnel. In addition, the hospital records show that services furnished were intensive treatment services, admission or related services, or equivalent services.     EXPECTED DISCHARGE DATE/DAY: TBD     DISPOSITION: Home       Signed By:   Waqas Muñoz MD  2/3/2017

## 2017-02-03 NOTE — BH NOTES
REFLECTIONS GROUP THERAPY PROGRESS NOTE    The patient Rafal Ramos is participating in Reflections Group Therapy. Group time: 30 minutes    Personal goal for participation: Share with group about feelings and concerns throughout the course of the day. Goal orientation: personal    Group therapy participation: active    Therapeutic interventions reviewed and discussed: Yes    Impression of participation:   Positive input.     Feli Leal  2/2/2017

## 2017-02-03 NOTE — BH NOTES
GROUP THERAPY PROGRESS NOTE    Blanca Lopez is participating in [facility].      Group time: 30 minutes    Personal goal for participation:  Unit orientation    Goal orientation: Community    Group therapy participation: active    Therapeutic interventions reviewed and discussed: Yes    Impression of participation: good

## 2017-02-03 NOTE — PROGRESS NOTES
Problem: Altered Thought Process (Adult/Pediatric)  Goal: *STG: Remains safe in hospital  Outcome: Progressing Towards Goal  Patient is isolative. Labile. Responding to internal stimuli. Patient is taking medications. Can be demanding at times. encouraged to attend groups. Will continue to monitor patient and assess needs.

## 2017-02-03 NOTE — PROGRESS NOTES
Problem: Altered Thought Process (Adult/Pediatric)  Goal: *STG: Complies with medication therapy  Outcome: Progressing Towards Goal  Patient continues to isolate in her room. . Compliant with medication and meals. Denies any pain or discomfort. Continue to monitor the patient and assess needs.

## 2017-02-03 NOTE — BH NOTES
COMMUNITY GROUP THERAPY PROGRESS NOTE    The patient Tayo Diaz is participating in the Community Therapy Group. Group time: 30 minutes    Personal goal for participation: To review unit guidelines and treatment plan    Goal orientation: personal    Group therapy participation: active    Therapeutic interventions reviewed and discussed: Yes    Impression of participation:   Positive input.     Feli Bal  2/2/2017

## 2017-02-03 NOTE — PROGRESS NOTES
Initial Nutrition Assessment:    INTERVENTIONS/RECOMMENDATIONS:   · Meals/Snacks: General/healthful diet:  Continue regular diet. Enc po/fluids. ASSESSMENT:   Chart reviewed; med noted for schizoaffective disorder. Hx of asthma, DM, HTN, GERD. No acute nutrition dx noted. Diet Order: Regular  % Eaten:  No data found. Pertinent Medications: [x]Reviewed []Other  Pertinent Labs: [x]Reviewed []Other  Food Allergies: [x]None []Other   Last BM:    [x]Active     []Hyperactive  []Hypoactive       [] Absent BS  Skin:    [x] Intact   [] Incision  [] Breakdown  [] Other:    Anthropometrics:   Height: 5' 2\" (157.5 cm) Weight: 91.7 kg (202 lb 2 oz)   IBW (%IBW):   ( ) UBW (%UBW):   (  %)   Last Weight Metrics:  Weight Loss Metrics 1/31/2017 1/27/2017 7/7/2016 7/5/2016   Today's Wt 202 lb 2 oz - 213 lb 6.4 oz -   BMI - 36.97 kg/m2 - 34.46 kg/m2       BMI: Body mass index is 36.97 kg/(m^2). This BMI is indicative of:   []Underweight    []Normal    []Overweight    [x] Obesity   [] Extreme Obesity (BMI>40)     Estimated Nutrition Needs (Based on):   1907 Kcals/day (BMR (1467) x 1. 3AF) , 92 g (1.0 g/kg bw) Protein  Carbohydrate:  At Least 130 g/day  Fluids: 1900 mL/day (1ml/kcal)    Pt expected to meet estimated nutrient needs: [x]Yes []No    NUTRITION DIAGNOSES:   Problem:   (No nutritional risk)      Etiology: related to       Signs/Symptoms: as evidenced by        NUTRITION INTERVENTIONS:  Meals/Snacks: General/healthful diet                  GOAL:   PO intake at least 50% of meals next 5-7 days    LEARNING NEEDS (Diet, Food/Nutrient-Drug Interaction):    [x] None Identified   [] Identified and Education Provided/Documented   [] Identified and Pt declined/was not appropriate     Cultureal, Restoration, OR Ethnic Dietary Needs:    [x] None Identified   [] Identified and Addressed     [x] Interdisciplinary Care Plan Reviewed/Documented    [x] Discharge Planning: Continue regular diet      MONITORING /EVALUATION: Food/Nutrient Intake Outcomes:  Total energy intake  Physical Signs/Symptoms Outcomes: Weight/weight change    NUTRITION RISK:    [] High              [] Moderate           [x]  Low  []  Minimal/Uncompromised    PT SEEN FOR:    []  MD Consult: []Calorie Count      []Diabetic Diet Education        []Diet Education     []Electrolyte Management     []General Nutrition Management and Supplements     []Management of Tube Feeding     []TPN Recommendations    []  RN Referral:  []MST score >=2     []Enteral/Parenteral Nutrition PTA     []Pregnant: Gestational DM or Multigestation     []Pressure Ulcer/Wound Care needs        []  Low BMI  [x]  [facility], [address] [place] [address]  Pager [phone]  Weekend Pager [phone]

## 2017-02-04 LAB
GLUCOSE BLD STRIP.AUTO-MCNC: 91 MG/DL (ref 65–100)
GLUCOSE BLD STRIP.AUTO-MCNC: 94 MG/DL (ref 65–100)
SERVICE CMNT-IMP: NORMAL
SERVICE CMNT-IMP: NORMAL

## 2017-02-04 PROCEDURE — 74011250637 HC RX REV CODE- 250/637

## 2017-02-04 PROCEDURE — 74011250637 HC RX REV CODE- 250/637: Performed by: INTERNAL MEDICINE

## 2017-02-04 PROCEDURE — 82962 GLUCOSE BLOOD TEST: CPT

## 2017-02-04 PROCEDURE — 65220000003 HC RM SEMIPRIVATE PSYCH

## 2017-02-04 PROCEDURE — 74011250637 HC RX REV CODE- 250/637: Performed by: PSYCHIATRY & NEUROLOGY

## 2017-02-04 RX ADMIN — METOPROLOL TARTRATE 25 MG: 25 TABLET ORAL at 21:43

## 2017-02-04 RX ADMIN — VALPROIC ACID 500 MG: 250 SOLUTION ORAL at 12:23

## 2017-02-04 RX ADMIN — ATORVASTATIN CALCIUM 40 MG: 40 TABLET, FILM COATED ORAL at 21:42

## 2017-02-04 RX ADMIN — ASPIRIN 325 MG ORAL TABLET 325 MG: 325 PILL ORAL at 10:22

## 2017-02-04 RX ADMIN — VALPROIC ACID 500 MG: 250 SOLUTION ORAL at 10:22

## 2017-02-04 RX ADMIN — CLONAZEPAM 1 MG: 1 TABLET ORAL at 16:14

## 2017-02-04 RX ADMIN — CLONAZEPAM 1 MG: 1 TABLET ORAL at 10:22

## 2017-02-04 RX ADMIN — METOPROLOL TARTRATE 25 MG: 25 TABLET ORAL at 10:21

## 2017-02-04 RX ADMIN — VITAMIN D, TAB 1000IU (100/BT) 4000 UNITS: 25 TAB at 10:21

## 2017-02-04 RX ADMIN — ACETAMINOPHEN 650 MG: 325 TABLET ORAL at 18:20

## 2017-02-04 RX ADMIN — Medication 1 MG: at 12:23

## 2017-02-04 RX ADMIN — Medication 1 MG: at 16:14

## 2017-02-04 RX ADMIN — VALPROIC ACID 500 MG: 250 SOLUTION ORAL at 16:14

## 2017-02-04 RX ADMIN — CLONAZEPAM 1 MG: 1 TABLET ORAL at 12:23

## 2017-02-04 RX ADMIN — Medication 1 MG: at 10:22

## 2017-02-04 RX ADMIN — PANTOPRAZOLE SODIUM 40 MG: 40 TABLET, DELAYED RELEASE ORAL at 07:12

## 2017-02-04 NOTE — BH NOTES
Pt is isolative to self. Pt is selectively mute, however, she speaks to express her needs. Pt was observed trying to cheek her morning medication, however, she swallowed them when confronted. Pt will continue to be monitored for safety.

## 2017-02-04 NOTE — PROGRESS NOTES
Problem: Altered Thought Process (Adult/Pediatric)  Goal: *STG: Remains safe in hospital  Outcome: Progressing Towards Goal  The patient was observed lying in bed with eyes closed; the patient didn't appear to be in any distress. The patient had unlabored breathing.  The patient slept for 8 hours and was safe during the night

## 2017-02-04 NOTE — BH NOTES
PSYCHIATRIC PROGRESS NOTE         Patient Name  Cuate Manzo   Date of Birth 1960   [facility] [id number]   Medical Record Number  [id number]      Age  49 y.o. PCP None   Admit date:  1/27/2017    Room Number  200/65  @ [facility]   Date of Service  2/4/2017          PSYCHOTHERAPY SESSION NOTE:  Length of psychotherapy session: 20 minutes    Main condition/diagnosis/issues treated during session today, 2/4/2017 : raeann, tx, psychosis, denial, racial slurrs    I employed Cognitive Behavioral therapy techniques, Reality-Oriented psychotherapy, as well as supportive psychotherapy in regards to various ongoing psychosocial stressors, including the following: pre-admission and current problems; housing issues; occupational issues; medical issues; and stress of hospitalization. Interpersonal relationship issues and psychodynamic conflicts explored. Attempts made to alleviate maladaptive patterns. Overall, patient is minially progressing    Treatment Plan Update (reviewed an updated 2/4/2017) : I will modify psychotherapy tx plan by implementing more stress management strategies, building upon cognitive behavioral techniques, increasing coping skills, as well as shoring up psychological defenses). An extended energy and skill set was needed to engage pt in psychotherapy due to some of the following: resistiveness, complexity, negativity, confrontational nature, hostile behaviors, and/or severe abnormalities in thought processes/psychosis resulting in the loss of expressive/receptive language communication skills. E & M PROGRESS NOTE:         HISTORY       CC:  \"i cant open the door for everyone\"  HISTORY OF PRESENT ILLNESS/INTERVAL HISTORY:  (reviewed/updated 2/4/2017). per initial evaluation: The patient, Cuate Manzo, is a 49 y.o.  WHITE OR   WHITE OR  female with a past psychiatric history significant for bipolar vs schizophrenia, who presents at this time with complaints of (and/or evidence of) the following emotional symptoms: psychotic behavior, paranoid behavior, raeann, psychosis and catatonia. Additional symptomatology include loud, hyperverbal, combative, demanding, entitled, anger outbursts, difficulty sleeping and hearing voices. The above symptoms have been present for a week. These symptoms are of severe severity. These symptoms are constant in nature. The patient's condition has been precipitated by psychosocial stressors. Patient's condition made worse by treatment noncompliance. UDS +negative; BAL=0. Pt given IM psych meds on adm due to severe combativeness. Chi Duque presents/reports/evidences the following emotional symptoms today, 2/4/2017:raeann and psychosis. The above symptoms have been present for weeks. These symptoms are of severe severity. The symptoms are constant in nature. Presented appropriately during team conference. Tends to isolate. Did not attend any scheduled groups. Tries to cheek meds. SIDE EFFECTS: (reviewed/updated 2/4/2017)  None reported or admitted to. No noted toxicity with use of Depakote   ALLERGIES:(reviewed/updated 2/4/2017)  Allergies   Allergen Reactions    Augmentin [Amoxicillin-Pot Clavulanate] Rash    Codeine Nausea Only    Cogentin [Benztropine] Other (comments)     Makes her sick    Iodine Other (comments)    Macrodantin [Nitrofurantoin Macrocrystalline] Rash    Morphine Shortness of Breath    Mucinex [Guaifenesin] Shortness of Breath    Oxycodone Other (comments)    Prednisone Anxiety      MEDICATIONS PRIOR TO ADMISSION:(reviewed/updated 2/4/2017)  Prescriptions Prior to Admission   Medication Sig    metoprolol tartrate (LOPRESSOR) 25 mg tablet Take 0.5 Tabs by mouth every twelve (12) hours. Indications: HYPERTENSION    risperiDONE (RISPERDAL) 2 mg tablet Take 1 Tab by mouth two (2) times a day.  Indications: BIPOLAR DISORDER IN REMISSION, SCHIZOPHRENIA    DULoxetine (CYMBALTA) 30 mg capsule Take 1 Cap by mouth nightly. Indications: ANXIETY WITH DEPRESSION, DEPRESSION    DULoxetine (CYMBALTA) 60 mg capsule Take 1 Cap by mouth daily. Indications: ANXIETY WITH DEPRESSION, DEPRESSION    aspirin (ASPIRIN) 325 mg tablet Take 325 mg by mouth daily. Indications: MYOCARDIAL INFARCTION PREVENTION    hydrOXYzine (ATARAX) 50 mg tablet Take 50 mg by mouth every six (6) hours as needed for Anxiety. Indications: ANXIETY    melatonin 3 mg tablet Take 3 mg by mouth nightly. Indications: INSOMNIA    omeprazole (PRILOSEC) 20 mg capsule Take 20 mg by mouth daily. Indications: HEARTBURN    simvastatin (ZOCOR) 40 mg tablet Take 40 mg by mouth nightly. Indications: HYPERCHOLESTEROLEMIA    cholecalciferol, vitamin D3, 2,000 unit tab Take 8,000 Int'l Units by mouth daily. Indications: PREVENTION OF VITAMIN D DEFICIENCY    magnesium oxide (MAG-OX) 400 mg tablet Take 400 mg by mouth daily. Indications: HYPOMAGNESEMIA      PAST MEDICAL HISTORY: Past medical history from the initial psychiatric evaluation has been reviewed (reviewed/updated 2/4/2017) with no additional updates (I asked patient and no additional past medical history provided). Past Medical History   Diagnosis Date    Anxiety     Asthma     Bipolar affective disorder ([address]     Chronic back pain     CVA (cerebral vascular accident) ([address]     Diabetes mellitus, type 2 ([address]     Dyslipidemia     Fibromyalgia     GERD (gastroesophageal reflux disease)     Hypertension     Schizophrenia ([facility]      Past Surgical History   Procedure Laterality Date    Hx knee arthroscopy      Hx bunionectomy      Hx cholecystectomy      Hx tubal ligation      Hx hysterectomy      Hx other surgical       orbit blow out repair. SOCIAL HISTORY: Social history from the initial psychiatric evaluation has been reviewed (reviewed/updated 2/4/2017) with no additional updates (I asked patient and no additional social history provided).    Social History Social History    Marital status: SINGLE     Spouse name: N/A    Number of children: N/A    Years of education: N/A     Occupational History    Not on file. Social History Main Topics    Smoking status: Current Every Day Smoker     Types: Cigarettes    Smokeless tobacco: Not on file    Alcohol use Not on file    Drug use: Yes     Special: Opiates    Sexual activity: Not on file     Other Topics Concern    Not on file     Social History Narrative    Lives in a trailer alone, in [facility]. . 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 [employer] HS diploma. Has one son, age 39. FAMILY HISTORY: Family history from the initial psychiatric evaluation has been reviewed (reviewed/updated 2/4/2017) with no additional updates (I asked patient and no additional family history provided). History reviewed. No pertinent family history. REVIEW OF SYSTEMS: (reviewed/updated 2/4/2017)  Appetite:improved   Sleep: improved   All other Review of Systems: Respiratory ROS: no cough, shortness of breath, or wheezing  Cardiovascular ROS: no chest pain or dyspnea on exertion  Gastrointestinal ROS: no abdominal pain, change in bowel habits, or black or bloody stools  Neurological ROS: no TIA or stroke symptoms         [address] [place] [address] ([facility]):    [facility] FINDINGS ARE WITHIN NORMAL LIMITS (WNL) UNLESS OTHERWISE STATED BELOW. ( ALL OF THE BELOW CATEGORIES OF THE [facility] HAVE BEEN REVIEWED (reviewed 2/4/2017) AND UPDATED AS DEEMED APPROPRIATE )  General Presentation disheveled and overweight, evasive, guarded, uncooperative and vague   Orientation disorganized   Vital Signs  See below (reviewed 2/4/2017); Vital Signs (BP, Pulse, & Temp) are within normal limits if not listed below.    Gait and Station Stable/steady, no ataxia   Musculoskeletal System No extrapyramidal symptoms (EPS); no abnormal muscular movements or Tardive Dyskinesia (TD); muscle strength and tone are within normal limits Language No aphasia or dysarthria   Speech:  hypoverbal and monotone, vulgar   Thought Processes llogical; slow rate of thoughts; poor abstract reasoning/computation   Thought Associations blocked  and goal directed   Thought Content Paranoid, aud north--- internally preoc   Suicidal Ideations no plan , no intention and none   Homicidal Ideations none   Mood:  angry, hostile , irritable and labile    Affect:  Irritable, angry/hostile demeanor, labile, and odd demeanor   Memory recent  impaired   Memory remote:  fair   Concentration/Attention:  fair   Fund of Knowledge below average   Insight:  limited   Reliability fair   Judgment:  limited          VITALS:     Patient Vitals for the past 24 hrs:   Temp Pulse Resp BP   02/04/17 1504 - 84 16 96/56     Wt Readings from Last 3 Encounters:   01/31/17 91.7 kg (202 lb 2 oz)   07/07/16 96.8 kg (213 lb 6.4 oz)     Temp Readings from Last 3 Encounters:   07/08/16 98.2 °F (36.8 °C)     BP Readings from Last 3 Encounters:   02/04/17 96/56   07/08/16 119/60     Pulse Readings from Last 3 Encounters:   02/04/17 84   07/08/16 94            DATA     LABORATORY DATA:(reviewed/updated 2/4/2017)  Recent Results (from the past 24 hour(s))   GLUCOSE, POC    Collection Time: 02/04/17  9:28 AM   Result Value Ref Range    Glucose (POC) 91 65 - 100 mg/dL    Performed by Abdulaziz Lopez    GLUCOSE, POC    Collection Time: 02/04/17  4:13 PM   Result Value Ref Range    Glucose (POC) 94 65 - 100 mg/dL    Performed by Abdulaziz Lopez      Lab Results   Component Value Date/Time    Valproic acid 3 02/02/2017 04:55 AM     No results found for: LI, ANTONIO, CHANTAL   RADIOLOGY REPORTS:(reviewed/updated 2/4/2017)  No results found.        MEDICATIONS     ALL MEDICATIONS:   Current Facility-Administered Medications   Medication Dose Route Frequency    valproic acid (as sodium salt) (DEPAKENE) 250 mg/5 mL (5 mL) oral solution 500 mg  500 mg Oral TID    Or    haloperidol lactate (HALDOL) injection 2 mg +++COURT ORDERED MEDICATION FOR REFUSAL OF PO VALPROIC ACID+++  2 mg IntraMUSCular TID    risperiDONE (RisperDAL) 1 mg/mL oral solution soln 1 mg  1 mg Oral TID    Or    haloperidol lactate (HALDOL) injection 1 mg +++COURT ORDERED MEDICATION FOR REFUSAL OF PO RISPERIDONE+++  1 mg IntraMUSCular TID    clonazePAM (KlonoPIN) tablet 1 mg  1 mg Oral TID    metoprolol tartrate (LOPRESSOR) tablet 25 mg  25 mg Oral Q12H    cloNIDine HCl (CATAPRES) tablet 0.2 mg  0.2 mg Oral Q6H PRN    aspirin (ASPIRIN) tablet 325 mg  325 mg Oral DAILY    cholecalciferol (VITAMIN D3) tablet 4,000 Units  4,000 Units Oral DAILY    pantoprazole (PROTONIX) tablet 40 mg  40 mg Oral ACB    atorvastatin (LIPITOR) tablet 40 mg  40 mg Oral QHS    insulin lispro (HUMALOG) injection   SubCUTAneous ACB&D    glucose chewable tablet 16 g  4 Tab Oral PRN    dextrose (D50W) injection syrg 12.5-25 g  12.5-25 g IntraVENous PRN    glucagon (GLUCAGEN) injection 1 mg  1 mg IntraMUSCular PRN    ziprasidone (GEODON) 20 mg in sterile water (preservative free) 1 mL injection  20 mg IntraMUSCular BID PRN    OLANZapine (ZyPREXA) tablet 5 mg  5 mg Oral Q6H PRN    LORazepam (ATIVAN) injection 2 mg  2 mg IntraMUSCular Q4H PRN    LORazepam (ATIVAN) tablet 1 mg  1 mg Oral Q4H PRN    zolpidem (AMBIEN) tablet 10 mg  10 mg Oral QHS PRN    acetaminophen (TYLENOL) tablet 650 mg  650 mg Oral Q4H PRN    ibuprofen (MOTRIN) tablet 400 mg  400 mg Oral Q8H PRN    magnesium hydroxide (MILK OF MAGNESIA) 400 mg/5 mL oral suspension 30 mL  30 mL Oral DAILY PRN    nicotine (NICODERM CQ) 21 mg/24 hr patch 1 Patch  1 Patch TransDERmal DAILY PRN      SCHEDULED MEDICATIONS:   Current Facility-Administered Medications   Medication Dose Route Frequency    valproic acid (as sodium salt) (DEPAKENE) 250 mg/5 mL (5 mL) oral solution 500 mg  500 mg Oral TID    Or    haloperidol lactate (HALDOL) injection 2 mg +++COURT ORDERED MEDICATION FOR REFUSAL OF PO VALPROIC ACID+++  2 mg IntraMUSCular TID    risperiDONE (RisperDAL) 1 mg/mL oral solution soln 1 mg  1 mg Oral TID    Or    haloperidol lactate (HALDOL) injection 1 mg +++COURT ORDERED MEDICATION FOR REFUSAL OF PO RISPERIDONE+++  1 mg IntraMUSCular TID    clonazePAM (KlonoPIN) tablet 1 mg  1 mg Oral TID    metoprolol tartrate (LOPRESSOR) tablet 25 mg  25 mg Oral Q12H    aspirin (ASPIRIN) tablet 325 mg  325 mg Oral DAILY    cholecalciferol (VITAMIN D3) tablet 4,000 Units  4,000 Units Oral DAILY    pantoprazole (PROTONIX) tablet 40 mg  40 mg Oral ACB    atorvastatin (LIPITOR) tablet 40 mg  40 mg Oral QHS    insulin lispro (HUMALOG) injection   SubCUTAneous ACB&D          ASSESSMENT & PLAN     DIAGNOSES REQUIRING ACTIVE TREATMENT AND MONITORING: (reviewed/updated 2/4/2017)  Patient Active Hospital Problem List:     schizoaffective dis vs schizophrenia, doubt bipolar dis  Assessment:  Will cont to secure old recs  Minimally better, still psychotic and labile. Discovered to be cheeking her meds the other day, dep level=0. Prognosis is extremely poor, apparently pt with a very poor baseline level of psych functioning with numerous long term i/p hospitalization. She does not take meds on an o/p basis contributing to her poor baseline. Collateral info from \"[facility] [place] [address] PACT Team (phone: [phone]; on call phone: [phone]; fax: [phone]) to provide a treatment update and obtain additional collateral information. Clinician shared that pt was recently opened to their PACT team on 1/9/17 after 5 repeated psychiatric hospitalizations since July 2016. Clinician shared pt was hospitalized at the following facilities: [facility] from 7/4/16-7/8/16 and [address] [place] [address] from 7/9/16-7/21/16, 9/12/16-11/4/16, 11/9/16-11/14/16, and most recent hospitalization was for almost two months from 11/15/16-1/4/17.  Clinician stated PACT team has had limited contact with pt since she was opened to their team. Clinician shared that pt did not answer her door on several occasions when PACT tried to visit. Clinician stated that pt was referred to PACT for her inability to manage her own medications in the community. \"  Plan: cont to adjust psych meds. Started mood stabilizer and AP along with benzos---titrate. I will monitor blood levels (Depakote---a drug with a narrow therapeutic index= NTI) and associated labs for drug therapy implemented that require intense monitoring for toxicity as deemed appropriate base on current medication side effects and pharmacodynamically determined drug 1/2 lives. Essential hypertension (7/6/2016)  Assessment: still elevated  Plan: cont to adjust bp meds    Catatonic state ([address] (1/27/2017)  Assessment:  now resolved  Plan: tx underlying psych etiology. Monitor for malignant catatonia, dc APs if vitals escalate      In summary, Marzette Krabbe, is a 49 y.o.  female who presents with a severe exacerbation of the principal diagnosis of Bipolar affective, mixed, sev w/ psych [facility])  Patient's condition is worsening/not improving/not stable . Patient requires continued inpatient hospitalization for further stabilization, safety monitoring and medication management. I will continue to coordinate the provision of individual, milieu, occupational, group, and substance abuse therapies to address target symptoms/diagnoses as deemed appropriate for the individual patient. A coordinated, multidisplinary treatment team round was conducted with the patient (this team consists of the nurse, psychiatric unit pharmcist,  and writer). Complete current electronic health record for patient has been reviewed today including consultant notes, ancillary staff notes, nurses and psychiatric tech notes. Suicide risk assessment completed and patient deemed to be of low risk for suicide at this time.      The following regarding medications was addressed during rounds with patient:   the risks and benefits of the proposed medication. The patient was given the opportunity to ask questions. Informed consent given to the use of the above medications. Will continue to adjust psychiatric and non-psychiatric medications (see above \"medication\" section and orders section for details) as deemed appropriate & based upon diagnoses and response to treatment. I will continue to order blood tests/labs and diagnostic tests as deemed appropriate and review results as they become available (see orders for details and above listed lab/test results). I will order psychiatric records from previous [facility] hospitals to further elucidate the nature of patient's psychopathology and review once available. I will gather additional collateral information from friends, family and o/p treatment team to further elucidate the nature of patient's psychopathology and baselline level of psychiatric functioning. I certify that this patient's inpatient psychiatric hospital services furnished since the previous certification were, and continue to be, required for treatment that could reasonably be expected to improve the patient's condition, or for diagnostic study, and that the patient continues to need, on a daily basis, active treatment furnished directly by or requiring the supervision of inpatient psychiatric facility personnel. In addition, the hospital records show that services furnished were intensive treatment services, admission or related services, or equivalent services.     EXPECTED DISCHARGE DATE/DAY: TBD     DISPOSITION: Home       Signed By:   Cas Santos MD  2/4/2017

## 2017-02-05 LAB
GLUCOSE BLD STRIP.AUTO-MCNC: 89 MG/DL (ref 65–100)
GLUCOSE BLD STRIP.AUTO-MCNC: 94 MG/DL (ref 65–100)
SERVICE CMNT-IMP: NORMAL
SERVICE CMNT-IMP: NORMAL

## 2017-02-05 PROCEDURE — 65220000003 HC RM SEMIPRIVATE PSYCH

## 2017-02-05 PROCEDURE — 82962 GLUCOSE BLOOD TEST: CPT

## 2017-02-05 PROCEDURE — 74011250637 HC RX REV CODE- 250/637: Performed by: PSYCHIATRY & NEUROLOGY

## 2017-02-05 PROCEDURE — 74011250637 HC RX REV CODE- 250/637: Performed by: INTERNAL MEDICINE

## 2017-02-05 RX ADMIN — ASPIRIN 325 MG ORAL TABLET 325 MG: 325 PILL ORAL at 08:51

## 2017-02-05 RX ADMIN — VITAMIN D, TAB 1000IU (100/BT) 4000 UNITS: 25 TAB at 08:51

## 2017-02-05 RX ADMIN — METOPROLOL TARTRATE 25 MG: 25 TABLET ORAL at 08:51

## 2017-02-05 RX ADMIN — ATORVASTATIN CALCIUM 40 MG: 40 TABLET, FILM COATED ORAL at 21:25

## 2017-02-05 RX ADMIN — METOPROLOL TARTRATE 25 MG: 25 TABLET ORAL at 21:25

## 2017-02-05 RX ADMIN — VALPROIC ACID 500 MG: 250 SOLUTION ORAL at 17:47

## 2017-02-05 RX ADMIN — VALPROIC ACID 500 MG: 250 SOLUTION ORAL at 08:51

## 2017-02-05 RX ADMIN — Medication 1 MG: at 12:04

## 2017-02-05 RX ADMIN — Medication 1 MG: at 08:51

## 2017-02-05 RX ADMIN — PANTOPRAZOLE SODIUM 40 MG: 40 TABLET, DELAYED RELEASE ORAL at 07:09

## 2017-02-05 RX ADMIN — CLONAZEPAM 1 MG: 1 TABLET ORAL at 12:04

## 2017-02-05 RX ADMIN — Medication 1 MG: at 17:47

## 2017-02-05 RX ADMIN — CLONAZEPAM 1 MG: 1 TABLET ORAL at 17:47

## 2017-02-05 RX ADMIN — VALPROIC ACID 500 MG: 250 SOLUTION ORAL at 12:04

## 2017-02-05 RX ADMIN — CLONAZEPAM 1 MG: 1 TABLET ORAL at 08:51

## 2017-02-05 NOTE — PROGRESS NOTES
Problem: Altered Thought Process (Adult/Pediatric)  Goal: *STG: Complies with medication therapy  Outcome: Progressing Towards Goal  Patient has been med and meal compliant. Approached for interaction with little response. Remains isolative and withdrawn. Encouraged to interact with staff and peers with no change Will continue to monitor patient and behavior.

## 2017-02-05 NOTE — BH NOTES
GROUP THERAPY PROGRESS NOTE    Dionna Tongfidelina is participating in [employer].      Group time: 30 minutes    Personal goal for participation:  Unit orientation    Goal orientation: Community    Group therapy participation: active    Therapeutic interventions reviewed and discussed: Yes    Impression of participation: good

## 2017-02-05 NOTE — BH NOTES
PSYCHIATRIC PROGRESS NOTE         Patient Name  Jamar Lei   Date of Birth 1960   [facility] [id number]   Medical Record Number  [id number]      Age  49 y.o. PCP None   Admit date:  1/27/2017    Room Number  787/09  @ [facility]   Date of Service  2/5/2017          PSYCHOTHERAPY SESSION NOTE:  Length of psychotherapy session: 20 minutes    Main condition/diagnosis/issues treated during session today, 2/5/2017 : raeann, tx, psychosis, denial, racial slurrs    I employed Cognitive Behavioral therapy techniques, Reality-Oriented psychotherapy, as well as supportive psychotherapy in regards to various ongoing psychosocial stressors, including the following: pre-admission and current problems; housing issues; occupational issues; medical issues; and stress of hospitalization. Interpersonal relationship issues and psychodynamic conflicts explored. Attempts made to alleviate maladaptive patterns. Overall, patient is minially progressing    Treatment Plan Update (reviewed an updated 2/5/2017) : I will modify psychotherapy tx plan by implementing more stress management strategies, building upon cognitive behavioral techniques, increasing coping skills, as well as shoring up psychological defenses). An extended energy and skill set was needed to engage pt in psychotherapy due to some of the following: resistiveness, complexity, negativity, confrontational nature, hostile behaviors, and/or severe abnormalities in thought processes/psychosis resulting in the loss of expressive/receptive language communication skills. E & M PROGRESS NOTE:         HISTORY       CC:  \"i cant open the door for everyone\"  HISTORY OF PRESENT ILLNESS/INTERVAL HISTORY:  (reviewed/updated 2/5/2017). per initial evaluation: The patient, Jamar Lei, is a 49 y.o.  WHITE OR   WHITE OR  female with a past psychiatric history significant for bipolar vs schizophrenia, who presents at this time with complaints of (and/or evidence of) the following emotional symptoms: psychotic behavior, paranoid behavior, raeann, psychosis and catatonia. Additional symptomatology include loud, hyperverbal, combative, demanding, entitled, anger outbursts, difficulty sleeping and hearing voices. The above symptoms have been present for a week. These symptoms are of severe severity. These symptoms are constant in nature. The patient's condition has been precipitated by psychosocial stressors. Patient's condition made worse by treatment noncompliance. UDS +negative; BAL=0. Pt given IM psych meds on adm due to severe combativeness. Dionna Amin presents/reports/evidences the following emotional symptoms today, 2/5/2017:raeann and psychosis. The above symptoms have been present for weeks. These symptoms are of severe severity. The symptoms are constant in nature. Presented appropriately during team conference. Tends to isolate. Did not attend any scheduled groups. Tries to cheek meds. Periods when she is demanding and agitated. SIDE EFFECTS: (reviewed/updated 2/5/2017)  None reported or admitted to. No noted toxicity with use of Depakote   ALLERGIES:(reviewed/updated 2/5/2017)  Allergies   Allergen Reactions    Augmentin [Amoxicillin-Pot Clavulanate] Rash    Codeine Nausea Only    Cogentin [Benztropine] Other (comments)     Makes her sick    Iodine Other (comments)    Macrodantin [Nitrofurantoin Macrocrystalline] Rash    Morphine Shortness of Breath    Mucinex [Guaifenesin] Shortness of Breath    Oxycodone Other (comments)    Prednisone Anxiety      MEDICATIONS PRIOR TO ADMISSION:(reviewed/updated 2/5/2017)  Prescriptions Prior to Admission   Medication Sig    metoprolol tartrate (LOPRESSOR) 25 mg tablet Take 0.5 Tabs by mouth every twelve (12) hours. Indications: HYPERTENSION    risperiDONE (RISPERDAL) 2 mg tablet Take 1 Tab by mouth two (2) times a day.  Indications: BIPOLAR DISORDER IN REMISSION, SCHIZOPHRENIA    DULoxetine (CYMBALTA) 30 mg capsule Take 1 Cap by mouth nightly. Indications: ANXIETY WITH DEPRESSION, DEPRESSION    DULoxetine (CYMBALTA) 60 mg capsule Take 1 Cap by mouth daily. Indications: ANXIETY WITH DEPRESSION, DEPRESSION    aspirin (ASPIRIN) 325 mg tablet Take 325 mg by mouth daily. Indications: MYOCARDIAL INFARCTION PREVENTION    hydrOXYzine (ATARAX) 50 mg tablet Take 50 mg by mouth every six (6) hours as needed for Anxiety. Indications: ANXIETY    melatonin 3 mg tablet Take 3 mg by mouth nightly. Indications: INSOMNIA    omeprazole (PRILOSEC) 20 mg capsule Take 20 mg by mouth daily. Indications: HEARTBURN    simvastatin (ZOCOR) 40 mg tablet Take 40 mg by mouth nightly. Indications: HYPERCHOLESTEROLEMIA    cholecalciferol, vitamin D3, 2,000 unit tab Take 8,000 Int'l Units by mouth daily. Indications: PREVENTION OF VITAMIN D DEFICIENCY    magnesium oxide (MAG-OX) 400 mg tablet Take 400 mg by mouth daily. Indications: HYPOMAGNESEMIA      PAST MEDICAL HISTORY: Past medical history from the initial psychiatric evaluation has been reviewed (reviewed/updated 2/5/2017) with no additional updates (I asked patient and no additional past medical history provided). Past Medical History   Diagnosis Date    Anxiety     Asthma     Bipolar affective disorder ([address]     Chronic back pain     CVA (cerebral vascular accident) ([address]     Diabetes mellitus, type 2 ([address]     Dyslipidemia     Fibromyalgia     GERD (gastroesophageal reflux disease)     Hypertension     Schizophrenia ([facility] [facility]      Past Surgical History   Procedure Laterality Date    Hx knee arthroscopy      Hx bunionectomy      Hx cholecystectomy      Hx tubal ligation      Hx hysterectomy      Hx other surgical       orbit blow out repair.       SOCIAL HISTORY: Social history from the initial psychiatric evaluation has been reviewed (reviewed/updated 2/5/2017) with no additional updates (I asked patient and no additional social history provided). Social History     Social History    Marital status: SINGLE     Spouse name: N/A    Number of children: N/A    Years of education: N/A     Occupational History    Not on file. Social History Main Topics    Smoking status: Current Every Day Smoker     Types: Cigarettes    Smokeless tobacco: Not on file    Alcohol use Not on file    Drug use: Yes     Special: Opiates    Sexual activity: Not on file     Other Topics Concern    Not on file     Social History Narrative    [place], [place] . 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 [employer]. HS diploma. Has one son, age 39. FAMILY HISTORY: Family history from the initial psychiatric evaluation has been reviewed (reviewed/updated 2/5/2017) with no additional updates (I asked patient and no additional family history provided). History reviewed. No pertinent family history. REVIEW OF SYSTEMS: (reviewed/updated 2/5/2017)  Appetite:improved   Sleep: improved   All other Review of Systems: Respiratory ROS: no cough, shortness of breath, or wheezing  Cardiovascular ROS: no chest pain or dyspnea on exertion  Gastrointestinal ROS: no abdominal pain, change in bowel habits, or black or bloody stools  Neurological ROS: no TIA or stroke symptoms         [facility] ([facility]    [facility] FINDINGS ARE WITHIN NORMAL LIMITS (WNL) UNLESS OTHERWISE STATED BELOW. ( ALL OF THE BELOW CATEGORIES OF THE [facility] HAVE BEEN REVIEWED (reviewed 2/5/2017) AND UPDATED AS DEEMED APPROPRIATE )  General Presentation disheveled and overweight, evasive, guarded, uncooperative and vague   Orientation disorganized   Vital Signs  See below (reviewed 2/5/2017); Vital Signs (BP, Pulse, & Temp) are within normal limits if not listed below.    Gait and Station Stable/steady, no ataxia   Musculoskeletal System No extrapyramidal symptoms (EPS); no abnormal muscular movements or Tardive Dyskinesia (TD); muscle strength and tone are within normal limits   Language No aphasia or dysarthria   Speech:  hypoverbal and monotone, vulgar   Thought Processes llogical; slow rate of thoughts; poor abstract reasoning/computation   Thought Associations blocked  and goal directed   Thought Content Paranoid, aud north--- internally preoc   Suicidal Ideations no plan , no intention and none   Homicidal Ideations none   Mood:  angry, hostile , irritable and labile    Affect:  Irritable, angry/hostile demeanor, labile, and odd demeanor   Memory recent  impaired   Memory remote:  fair   Concentration/Attention:  fair   Fund of Knowledge below average   Insight:  limited   Reliability fair   Judgment:  limited          VITALS:     Patient Vitals for the past 24 hrs:   Temp Pulse Resp BP   02/05/17 0644 97.9 °F (36.6 °C) 74 16 124/66     Wt Readings from Last 3 Encounters:   01/31/17 91.7 kg (202 lb 2 oz)   07/07/16 96.8 kg (213 lb 6.4 oz)     Temp Readings from Last 3 Encounters:   02/05/17 97.9 °F (36.6 °C)   07/08/16 98.2 °F (36.8 °C)     BP Readings from Last 3 Encounters:   02/05/17 124/66   07/08/16 119/60     Pulse Readings from Last 3 Encounters:   02/05/17 74   07/08/16 94            DATA     LABORATORY DATA:(reviewed/updated 2/5/2017)  Recent Results (from the past 24 hour(s))   GLUCOSE, POC    Collection Time: 02/05/17  7:50 AM   Result Value Ref Range    Glucose (POC) 94 65 - 100 mg/dL    Performed by Bud Patel    GLUCOSE, POC    Collection Time: 02/05/17  4:05 PM   Result Value Ref Range    Glucose (POC) 89 65 - 100 mg/dL    Performed by Cristina Pod      Lab Results   Component Value Date/Time    Valproic acid 3 02/02/2017 04:55 AM     No results found for: NORBERTO, ANTONIO, CHANTAL   RADIOLOGY REPORTS:(reviewed/updated 2/5/2017)  No results found.        MEDICATIONS     ALL MEDICATIONS:   Current Facility-Administered Medications   Medication Dose Route Frequency    valproic acid (as sodium salt) (DEPAKENE) 250 mg/5 mL (5 mL) oral solution 500 mg  500 mg Oral TID    Or    haloperidol lactate (HALDOL) injection 2 mg +++COURT ORDERED MEDICATION FOR REFUSAL OF PO VALPROIC ACID+++  2 mg IntraMUSCular TID    risperiDONE (RisperDAL) 1 mg/mL oral solution soln 1 mg  1 mg Oral TID    Or    haloperidol lactate (HALDOL) injection 1 mg +++COURT ORDERED MEDICATION FOR REFUSAL OF PO RISPERIDONE+++  1 mg IntraMUSCular TID    clonazePAM (KlonoPIN) tablet 1 mg  1 mg Oral TID    metoprolol tartrate (LOPRESSOR) tablet 25 mg  25 mg Oral Q12H    cloNIDine HCl (CATAPRES) tablet 0.2 mg  0.2 mg Oral Q6H PRN    aspirin (ASPIRIN) tablet 325 mg  325 mg Oral DAILY    cholecalciferol (VITAMIN D3) tablet 4,000 Units  4,000 Units Oral DAILY    pantoprazole (PROTONIX) tablet 40 mg  40 mg Oral ACB    atorvastatin (LIPITOR) tablet 40 mg  40 mg Oral QHS    insulin lispro (HUMALOG) injection   SubCUTAneous ACB&D    glucose chewable tablet 16 g  4 Tab Oral PRN    dextrose (D50W) injection syrg 12.5-25 g  12.5-25 g IntraVENous PRN    glucagon (GLUCAGEN) injection 1 mg  1 mg IntraMUSCular PRN    ziprasidone (GEODON) 20 mg in sterile water (preservative free) 1 mL injection  20 mg IntraMUSCular BID PRN    OLANZapine (ZyPREXA) tablet 5 mg  5 mg Oral Q6H PRN    LORazepam (ATIVAN) injection 2 mg  2 mg IntraMUSCular Q4H PRN    LORazepam (ATIVAN) tablet 1 mg  1 mg Oral Q4H PRN    zolpidem (AMBIEN) tablet 10 mg  10 mg Oral QHS PRN    acetaminophen (TYLENOL) tablet 650 mg  650 mg Oral Q4H PRN    ibuprofen (MOTRIN) tablet 400 mg  400 mg Oral Q8H PRN    magnesium hydroxide (MILK OF MAGNESIA) 400 mg/5 mL oral suspension 30 mL  30 mL Oral DAILY PRN    nicotine (NICODERM CQ) 21 mg/24 hr patch 1 Patch  1 Patch TransDERmal DAILY PRN      SCHEDULED MEDICATIONS:   Current Facility-Administered Medications   Medication Dose Route Frequency    valproic acid (as sodium salt) (DEPAKENE) 250 mg/5 mL (5 mL) oral solution 500 mg  500 mg Oral TID    Or    haloperidol lactate (HALDOL) injection 2 mg +++COURT ORDERED MEDICATION FOR REFUSAL OF PO VALPROIC ACID+++  2 mg IntraMUSCular TID    risperiDONE (RisperDAL) 1 mg/mL oral solution soln 1 mg  1 mg Oral TID    Or    haloperidol lactate (HALDOL) injection 1 mg +++COURT ORDERED MEDICATION FOR REFUSAL OF PO RISPERIDONE+++  1 mg IntraMUSCular TID    clonazePAM (KlonoPIN) tablet 1 mg  1 mg Oral TID    metoprolol tartrate (LOPRESSOR) tablet 25 mg  25 mg Oral Q12H    aspirin (ASPIRIN) tablet 325 mg  325 mg Oral DAILY    cholecalciferol (VITAMIN D3) tablet 4,000 Units  4,000 Units Oral DAILY    pantoprazole (PROTONIX) tablet 40 mg  40 mg Oral ACB    atorvastatin (LIPITOR) tablet 40 mg  40 mg Oral QHS    insulin lispro (HUMALOG) injection   SubCUTAneous ACB&D          ASSESSMENT & PLAN     DIAGNOSES REQUIRING ACTIVE TREATMENT AND MONITORING: (reviewed/updated 2/5/2017)  Patient Active Hospital Problem List:     schizoaffective dis vs schizophrenia, doubt bipolar dis  Assessment:  Will cont to secure old recs  Minimally better, still psychotic and labile. Discovered to be cheeking her meds the other day, dep level=0. Prognosis is extremely poor, apparently pt with a very poor baseline level of psych functioning with numerous long term i/p hospitalization. She does not take meds on an o/p basis contributing to her poor baseline. Collateral info from \"[facility] [place] [address] PACT Team (phone: [phone]; on call phone: [phone]; fax: [phone]) to provide a treatment update and obtain additional collateral information. Clinician shared that pt was recently opened to their PACT team on 1/9/17 after 5 repeated psychiatric hospitalizations since July 2016.  Clinician shared pt was hospitalized at the following facilities: [facility] from 7/4/16-7/8/16 and [address] [place] [address] from 7/9/16-7/21/16, 9/12/16-11/4/16, 11/9/16-11/14/16, and most recent hospitalization was for almost two months from 11/15/16-1/4/17. Clinician stated PACT team has had limited contact with pt since she was opened to their team. Clinician shared that pt did not answer her door on several occasions when PACT tried to visit. Clinician stated that pt was referred to PACT for her inability to manage her own medications in the community. \"  Plan: cont to adjust psych meds. Started mood stabilizer and AP along with benzos---titrate. I will monitor blood levels (Depakote---a drug with a narrow therapeutic index= NTI) and associated labs for drug therapy implemented that require intense monitoring for toxicity as deemed appropriate base on current medication side effects and pharmacodynamically determined drug 1/2 lives. Essential hypertension (7/6/2016)  Assessment: still elevated  Plan: cont to adjust bp meds    Catatonic state ([facility] [address] (1/27/2017)  Assessment:  now resolved  Plan: tx underlying psych etiology. Monitor for malignant catatonia, dc APs if vitals escalate      In summary, Katheryn Grajeda, is a 49 y.o.  female who presents with a severe exacerbation of the principal diagnosis of Bipolar affective, mixed, sev w/ psych [facility])  Patient's condition is worsening/not improving/not stable . Patient requires continued inpatient hospitalization for further stabilization, safety monitoring and medication management. I will continue to coordinate the provision of individual, milieu, occupational, group, and substance abuse therapies to address target symptoms/diagnoses as deemed appropriate for the individual patient. A coordinated, multidisplinary treatment team round was conducted with the patient (this team consists of the nurse, psychiatric unit pharmcist,  and writer). Complete current electronic health record for patient has been reviewed today including consultant notes, ancillary staff notes, nurses and psychiatric tech notes.     Suicide risk assessment completed and patient deemed to be of low risk for suicide at this time. The following regarding medications was addressed during rounds with patient:   the risks and benefits of the proposed medication. The patient was given the opportunity to ask questions. Informed consent given to the use of the above medications. Will continue to adjust psychiatric and non-psychiatric medications (see above \"medication\" section and orders section for details) as deemed appropriate & based upon diagnoses and response to treatment. I will continue to order blood tests/labs and diagnostic tests as deemed appropriate and review results as they become available (see orders for details and above listed lab/test results). I will order psychiatric records from previous [facility] hospitals to further elucidate the nature of patient's psychopathology and review once available. I will gather additional collateral information from friends, family and o/p treatment team to further elucidate the nature of patient's psychopathology and baselline level of psychiatric functioning. I certify that this patient's inpatient psychiatric hospital services furnished since the previous certification were, and continue to be, required for treatment that could reasonably be expected to improve the patient's condition, or for diagnostic study, and that the patient continues to need, on a daily basis, active treatment furnished directly by or requiring the supervision of inpatient psychiatric facility personnel. In addition, the hospital records show that services furnished were intensive treatment services, admission or related services, or equivalent services.     EXPECTED DISCHARGE DATE/DAY: TBD     DISPOSITION: Home       Signed By:   Soila Spring MD  2/5/2017

## 2017-02-06 LAB
GLUCOSE BLD STRIP.AUTO-MCNC: 110 MG/DL (ref 65–100)
SERVICE CMNT-IMP: ABNORMAL
VALPROATE SERPL-MCNC: 64 UG/ML (ref 50–100)

## 2017-02-06 PROCEDURE — 82962 GLUCOSE BLOOD TEST: CPT

## 2017-02-06 PROCEDURE — 80164 ASSAY DIPROPYLACETIC ACD TOT: CPT | Performed by: PSYCHIATRY & NEUROLOGY

## 2017-02-06 PROCEDURE — 65220000003 HC RM SEMIPRIVATE PSYCH

## 2017-02-06 PROCEDURE — 74011250637 HC RX REV CODE- 250/637: Performed by: PSYCHIATRY & NEUROLOGY

## 2017-02-06 PROCEDURE — 74011250637 HC RX REV CODE- 250/637: Performed by: INTERNAL MEDICINE

## 2017-02-06 PROCEDURE — 36415 COLL VENOUS BLD VENIPUNCTURE: CPT | Performed by: PSYCHIATRY & NEUROLOGY

## 2017-02-06 PROCEDURE — 74011250637 HC RX REV CODE- 250/637

## 2017-02-06 RX ORDER — RISPERIDONE 1 MG/ML
2 SOLUTION ORAL 2 TIMES DAILY
Status: DISCONTINUED | OUTPATIENT
Start: 2017-02-06 | End: 2017-02-10 | Stop reason: HOSPADM

## 2017-02-06 RX ORDER — HALOPERIDOL 5 MG/ML
1 INJECTION INTRAMUSCULAR 2 TIMES DAILY
Status: DISCONTINUED | OUTPATIENT
Start: 2017-02-06 | End: 2017-02-10 | Stop reason: HOSPADM

## 2017-02-06 RX ADMIN — VITAMIN D, TAB 1000IU (100/BT) 4000 UNITS: 25 TAB at 08:45

## 2017-02-06 RX ADMIN — CLONAZEPAM 1 MG: 1 TABLET ORAL at 11:49

## 2017-02-06 RX ADMIN — Medication 2 MG: at 16:37

## 2017-02-06 RX ADMIN — VALPROIC ACID 500 MG: 250 SOLUTION ORAL at 11:49

## 2017-02-06 RX ADMIN — ACETAMINOPHEN 650 MG: 325 TABLET ORAL at 21:13

## 2017-02-06 RX ADMIN — VALPROIC ACID 500 MG: 250 SOLUTION ORAL at 16:39

## 2017-02-06 RX ADMIN — VALPROIC ACID 500 MG: 250 SOLUTION ORAL at 08:48

## 2017-02-06 RX ADMIN — PANTOPRAZOLE SODIUM 40 MG: 40 TABLET, DELAYED RELEASE ORAL at 06:03

## 2017-02-06 RX ADMIN — ATORVASTATIN CALCIUM 40 MG: 40 TABLET, FILM COATED ORAL at 21:13

## 2017-02-06 RX ADMIN — CLONAZEPAM 1 MG: 1 TABLET ORAL at 16:38

## 2017-02-06 RX ADMIN — METOPROLOL TARTRATE 25 MG: 25 TABLET ORAL at 20:51

## 2017-02-06 RX ADMIN — METOPROLOL TARTRATE 25 MG: 25 TABLET ORAL at 08:45

## 2017-02-06 RX ADMIN — CLONAZEPAM 1 MG: 1 TABLET ORAL at 08:45

## 2017-02-06 RX ADMIN — ASPIRIN 325 MG ORAL TABLET 325 MG: 325 PILL ORAL at 08:45

## 2017-02-06 RX ADMIN — Medication 1 MG: at 08:45

## 2017-02-06 NOTE — BH NOTES
PSYCHIATRIC PROGRESS NOTE         Patient Name  Delvis Pruett   Date of Birth 1960   [facility] [id number]   Medical Record Number  [id number]      Age  49 y.o. PCP None   Admit date:  1/27/2017    Room Number  694/30  @ [address]   Date of Service  2/6/2017          PSYCHOTHERAPY SESSION NOTE:  Length of psychotherapy session: 20 minutes    Main condition/diagnosis/issues treated during session today, 2/6/2017 : raeann, tx, psychosis, denial, mood    I employed Cognitive Behavioral therapy techniques, Reality-Oriented psychotherapy, as well as supportive psychotherapy in regards to various ongoing psychosocial stressors, including the following: pre-admission and current problems; housing issues; occupational issues; medical issues; and stress of hospitalization. Interpersonal relationship issues and psychodynamic conflicts explored. Attempts made to alleviate maladaptive patterns. Overall, patient is minially progressing    Treatment Plan Update (reviewed an updated 2/6/2017) : I will modify psychotherapy tx plan by implementing more stress management strategies, building upon cognitive behavioral techniques, increasing coping skills, as well as shoring up psychological defenses). An extended energy and skill set was needed to engage pt in psychotherapy due to some of the following: resistiveness, complexity, negativity, confrontational nature, hostile behaviors, and/or severe abnormalities in thought processes/psychosis resulting in the loss of expressive/receptive language communication skills. E & M PROGRESS NOTE:         HISTORY       CC:  \"i'm afraid of the dark\"  HISTORY OF PRESENT ILLNESS/INTERVAL HISTORY:  (reviewed/updated 2/6/2017). per initial evaluation: The patient, Delvis Pruett, is a 49 y.o.  WHITE OR   WHITE OR  female with a past psychiatric history significant for bipolar vs schizophrenia, who presents at this time with complaints of (and/or evidence of) the following emotional symptoms: psychotic behavior, paranoid behavior, raeann, psychosis and catatonia. Additional symptomatology include loud, hyperverbal, combative, demanding, entitled, anger outbursts, difficulty sleeping and hearing voices. The above symptoms have been present for a week. These symptoms are of severe severity. These symptoms are constant in nature. The patient's condition has been precipitated by psychosocial stressors. Patient's condition made worse by treatment noncompliance. UDS +negative; BAL=0. Pt given IM psych meds on adm due to severe combativeness. Dionna Jyothidennissahil presents/reports/evidences the following emotional symptoms today, 2/6/2017:raeann and psychosis. The above symptoms have been present for weeks. These symptoms are of severe severity. The symptoms are constant in nature. Additional symptomatology and features include severe disorganization of thought processes, labile, paranoid, bizarre behaviors, aud north--internally preoc, less agitated and irritable today. SIDE EFFECTS: (reviewed/updated 2/6/2017)  None reported or admitted to. No noted toxicity with use of Depakote   ALLERGIES:(reviewed/updated 2/6/2017)  Allergies   Allergen Reactions    Augmentin [Amoxicillin-Pot Clavulanate] Rash    Codeine Nausea Only    Cogentin [Benztropine] Other (comments)     Makes her sick    Iodine Other (comments)    Macrodantin [Nitrofurantoin Macrocrystalline] Rash    Morphine Shortness of Breath    Mucinex [Guaifenesin] Shortness of Breath    Oxycodone Other (comments)    Prednisone Anxiety      MEDICATIONS PRIOR TO ADMISSION:(reviewed/updated 2/6/2017)  Prescriptions Prior to Admission   Medication Sig    metoprolol tartrate (LOPRESSOR) 25 mg tablet Take 0.5 Tabs by mouth every twelve (12) hours. Indications: HYPERTENSION    risperiDONE (RISPERDAL) 2 mg tablet Take 1 Tab by mouth two (2) times a day.  Indications: BIPOLAR DISORDER IN REMISSION, SCHIZOPHRENIA    DULoxetine (CYMBALTA) 30 mg capsule Take 1 Cap by mouth nightly. Indications: ANXIETY WITH DEPRESSION, DEPRESSION    DULoxetine (CYMBALTA) 60 mg capsule Take 1 Cap by mouth daily. Indications: ANXIETY WITH DEPRESSION, DEPRESSION    aspirin (ASPIRIN) 325 mg tablet Take 325 mg by mouth daily. Indications: MYOCARDIAL INFARCTION PREVENTION    hydrOXYzine (ATARAX) 50 mg tablet Take 50 mg by mouth every six (6) hours as needed for Anxiety. Indications: ANXIETY    melatonin 3 mg tablet Take 3 mg by mouth nightly. Indications: INSOMNIA    omeprazole (PRILOSEC) 20 mg capsule Take 20 mg by mouth daily. Indications: HEARTBURN    simvastatin (ZOCOR) 40 mg tablet Take 40 mg by mouth nightly. Indications: HYPERCHOLESTEROLEMIA    cholecalciferol, vitamin D3, 2,000 unit tab Take 8,000 Int'l Units by mouth daily. Indications: PREVENTION OF VITAMIN D DEFICIENCY    magnesium oxide (MAG-OX) 400 mg tablet Take 400 mg by mouth daily. Indications: HYPOMAGNESEMIA      PAST MEDICAL HISTORY: Past medical history from the initial psychiatric evaluation has been reviewed (reviewed/updated 2/6/2017) with no additional updates (I asked patient and no additional past medical history provided). Past Medical History   Diagnosis Date    Anxiety     Asthma     Bipolar affective disorder ([address]     Chronic back pain     CVA (cerebral vascular accident) ([address]     Diabetes mellitus, type 2 ([address]     Dyslipidemia     Fibromyalgia     GERD (gastroesophageal reflux disease)     Hypertension     Schizophrenia ([facility]      Past Surgical History   Procedure Laterality Date    Hx knee arthroscopy      Hx bunionectomy      Hx cholecystectomy      Hx tubal ligation      Hx hysterectomy      Hx other surgical       orbit blow out repair.       SOCIAL HISTORY: Social history from the initial psychiatric evaluation has been reviewed (reviewed/updated 2/6/2017) with no additional updates (I asked patient and no additional social history provided). Social History     Social History    Marital status: SINGLE     Spouse name: N/A    Number of children: N/A    Years of education: N/A     Occupational History    Not on file. Social History Main Topics    Smoking status: Current Every Day Smoker     Types: Cigarettes    Smokeless tobacco: Not on file    Alcohol use Not on file    Drug use: Yes     Special: Opiates    Sexual activity: Not on file     Other Topics Concern    Not on file     Social History Narrative    Lives [place] a trailer alone, in [place] . 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 [employer] diploma. Has one son, age 39. FAMILY HISTORY: Family history from the initial psychiatric evaluation has been reviewed (reviewed/updated 2/6/2017) with no additional updates (I asked patient and no additional family history provided). History reviewed. No pertinent family history. REVIEW OF SYSTEMS: (reviewed/updated 2/6/2017)  Appetite:improved   Sleep: improved   All other Review of Systems: Respiratory ROS: no cough, shortness of breath, or wheezing  Cardiovascular ROS: no chest pain or dyspnea on exertion  Gastrointestinal ROS: no abdominal pain, change in bowel habits, or black or bloody stools  Neurological ROS: no TIA or stroke symptoms         [facility] ([facility]):    [facility] FINDINGS ARE WITHIN NORMAL LIMITS (WNL) UNLESS OTHERWISE STATED BELOW. ( ALL OF THE BELOW CATEGORIES OF THE [facility] HAVE BEEN REVIEWED (reviewed 2/6/2017) AND UPDATED AS DEEMED APPROPRIATE )  General Presentation disheveled and overweight, evasive, guarded, uncooperative and vague   Orientation disorganized   Vital Signs  See below (reviewed 2/6/2017); Vital Signs (BP, Pulse, & Temp) are within normal limits if not listed below.    Gait and Station Stable/steady, no ataxia   Musculoskeletal System No extrapyramidal symptoms (EPS); no abnormal muscular movements or Tardive Dyskinesia (TD); muscle strength and tone are within normal limits   Language No aphasia or dysarthria   Speech:  hypoverbal and monotone, vulgar   Thought Processes llogical; slow rate of thoughts; poor abstract reasoning/computation   Thought Associations blocked  and goal directed   Thought Content Paranoid, aud north   Suicidal Ideations no plan , no intention and none   Homicidal Ideations none   Mood:  euthymic and irritable   Affect:  Irritable, euthymic, and odd demeanor   Memory recent  impaired   Memory remote:  fair   Concentration/Attention:  fair   Fund of Knowledge below average   Insight:  limited   Reliability fair   Judgment:  limited          VITALS:     Patient Vitals for the past 24 hrs:   Temp Pulse Resp BP   02/06/17 0600 95.3 °F (35.2 °C) 76 18 114/70   02/05/17 2125 - (!) 103 - 149/70     Wt Readings from Last 3 Encounters:   01/31/17 91.7 kg (202 lb 2 oz)   07/07/16 96.8 kg (213 lb 6.4 oz)     Temp Readings from Last 3 Encounters:   02/06/17 95.3 °F (35.2 °C)   07/08/16 98.2 °F (36.8 °C)     BP Readings from Last 3 Encounters:   02/06/17 114/70   07/08/16 119/60     Pulse Readings from Last 3 Encounters:   02/06/17 76   07/08/16 94            DATA     LABORATORY DATA:(reviewed/updated 2/6/2017)  Recent Results (from the past 24 hour(s))   VALPROIC ACID    Collection Time: 02/06/17  4:46 AM   Result Value Ref Range    Valproic acid 64 50 - 100 ug/ml   GLUCOSE, POC    Collection Time: 02/06/17  4:34 PM   Result Value Ref Range    Glucose (POC) 110 (H) 65 - 100 mg/dL    Performed by Nick Ko      Lab Results   Component Value Date/Time    Valproic acid 64 02/06/2017 04:46 AM     No results found for: LI, LIH, LITHM   RADIOLOGY REPORTS:(reviewed/updated 2/6/2017)  No results found.        MEDICATIONS     ALL MEDICATIONS:   Current Facility-Administered Medications   Medication Dose Route Frequency    risperiDONE (RisperDAL) 1 mg/mL oral solution soln 2 mg  2 mg Oral BID    Or    haloperidol lactate (HALDOL) injection 1 mg +++COURT ORDERED MEDICATION FOR REFUSAL OF PO RISPERIDIONE+++  1 mg IntraMUSCular BID    valproic acid (as sodium salt) (DEPAKENE) 250 mg/5 mL (5 mL) oral solution 500 mg  500 mg Oral TID    Or    haloperidol lactate (HALDOL) injection 2 mg +++COURT ORDERED MEDICATION FOR REFUSAL OF PO VALPROIC ACID+++  2 mg IntraMUSCular TID    clonazePAM (KlonoPIN) tablet 1 mg  1 mg Oral TID    metoprolol tartrate (LOPRESSOR) tablet 25 mg  25 mg Oral Q12H    cloNIDine HCl (CATAPRES) tablet 0.2 mg  0.2 mg Oral Q6H PRN    aspirin (ASPIRIN) tablet 325 mg  325 mg Oral DAILY    cholecalciferol (VITAMIN D3) tablet 4,000 Units  4,000 Units Oral DAILY    pantoprazole (PROTONIX) tablet 40 mg  40 mg Oral ACB    atorvastatin (LIPITOR) tablet 40 mg  40 mg Oral QHS    insulin lispro (HUMALOG) injection   SubCUTAneous ACB&D    glucose chewable tablet 16 g  4 Tab Oral PRN    dextrose (D50W) injection syrg 12.5-25 g  12.5-25 g IntraVENous PRN    glucagon (GLUCAGEN) injection 1 mg  1 mg IntraMUSCular PRN    ziprasidone (GEODON) 20 mg in sterile water (preservative free) 1 mL injection  20 mg IntraMUSCular BID PRN    OLANZapine (ZyPREXA) tablet 5 mg  5 mg Oral Q6H PRN    LORazepam (ATIVAN) injection 2 mg  2 mg IntraMUSCular Q4H PRN    LORazepam (ATIVAN) tablet 1 mg  1 mg Oral Q4H PRN    zolpidem (AMBIEN) tablet 10 mg  10 mg Oral QHS PRN    acetaminophen (TYLENOL) tablet 650 mg  650 mg Oral Q4H PRN    ibuprofen (MOTRIN) tablet 400 mg  400 mg Oral Q8H PRN    magnesium hydroxide (MILK OF MAGNESIA) 400 mg/5 mL oral suspension 30 mL  30 mL Oral DAILY PRN    nicotine (NICODERM CQ) 21 mg/24 hr patch 1 Patch  1 Patch TransDERmal DAILY PRN      SCHEDULED MEDICATIONS:   Current Facility-Administered Medications   Medication Dose Route Frequency    risperiDONE (RisperDAL) 1 mg/mL oral solution soln 2 mg  2 mg Oral BID    Or    haloperidol lactate (HALDOL) injection 1 mg +++COURT ORDERED MEDICATION FOR REFUSAL OF PO RISPERIDIONE+++  1 mg IntraMUSCular BID    valproic acid (as sodium salt) (DEPAKENE) 250 mg/5 mL (5 mL) oral solution 500 mg  500 mg Oral TID    Or    haloperidol lactate (HALDOL) injection 2 mg +++COURT ORDERED MEDICATION FOR REFUSAL OF PO VALPROIC ACID+++  2 mg IntraMUSCular TID    clonazePAM (KlonoPIN) tablet 1 mg  1 mg Oral TID    metoprolol tartrate (LOPRESSOR) tablet 25 mg  25 mg Oral Q12H    aspirin (ASPIRIN) tablet 325 mg  325 mg Oral DAILY    cholecalciferol (VITAMIN D3) tablet 4,000 Units  4,000 Units Oral DAILY    pantoprazole (PROTONIX) tablet 40 mg  40 mg Oral ACB    atorvastatin (LIPITOR) tablet 40 mg  40 mg Oral QHS    insulin lispro (HUMALOG) injection   SubCUTAneous ACB&D          ASSESSMENT & PLAN     DIAGNOSES REQUIRING ACTIVE TREATMENT AND MONITORING: (reviewed/updated 2/6/2017)  Patient Active Hospital Problem List:     schizoaffective dis vs schizophrenia, doubt bipolar dis  Assessment:  Will cont to secure old recs  Minimally better, still psychotic and labile. Discovered to be cheeking her meds the other day, dep level=0. Prognosis is extremely poor, apparently pt with a very poor baseline level of psych functioning with numerous long term i/p hospitalization. She does not take meds on an o/p basis contributing to her poor baseline. Collateral info from [facility] [place] [id number] PACT Team (phone: [phone]; on call phone: [phone]; fax: [phone]) to provide a treatment update and obtain additional collateral information. Clinician shared that pt was recently opened to their PACT team on 1/9/17 after 5 repeated psychiatric hospitalizations since July 2016. Clinician shared pt was hospitalized at the following facilities: [facility] from 7/4/16-7/8/16 and [address] [place] [address] from 7/9/16-7/21/16, 9/12/16-11/4/16, 11/9/16-11/14/16, and most recent hospitalization was for almost two months from 11/15/16-1/4/17. Clinician stated PACT team has had limited contact with pt since she was opened to their team. Clinician shared that pt did not answer her door on several occasions when PACT tried to visit. Clinician stated that pt was referred to PACT for her inability to manage her own medications in the community. \"  Plan: I will cont to adjust psych meds. I will monitor blood levels (Depakote---a drug with a narrow therapeutic index= NTI) and associated labs for drug therapy implemented that require intense monitoring for toxicity as deemed appropriate base on current medication side effects and pharmacodynamically determined drug 1/2 lives. Dep level =64 after 4 days. Essential hypertension (7/6/2016)  Assessment: still elevated  Plan: cont to adjust bp meds    Catatonic state ([address] (1/27/2017)  Assessment:  now resolved  Plan: tx underlying psych etiology. Monitor for malignant catatonia, dc APs if vitals escalate      In summary, Blanca Lopez, is a 49 y.o.  female who presents with a severe exacerbation of the principal diagnosis of Bipolar affective, mixed, sev w/ psych [facility])  Patient's condition is worsening/not improving/not stable . Patient requires continued inpatient hospitalization for further stabilization, safety monitoring and medication management. I will continue to coordinate the provision of individual, milieu, occupational, group, and substance abuse therapies to address target symptoms/diagnoses as deemed appropriate for the individual patient. A coordinated, multidisplinary treatment team round was conducted with the patient (this team consists of the nurse, psychiatric unit pharmcist,  and writer). Complete current electronic health record for patient has been reviewed today including consultant notes, ancillary staff notes, nurses and psychiatric tech notes. Suicide risk assessment completed and patient deemed to be of low risk for suicide at this time.      The following regarding medications was addressed during rounds with patient:   the risks and benefits of the proposed medication. The patient was given the opportunity to ask questions. Informed consent given to the use of the above medications. Will continue to adjust psychiatric and non-psychiatric medications (see above \"medication\" section and orders section for details) as deemed appropriate & based upon diagnoses and response to treatment. I will continue to order blood tests/labs and diagnostic tests as deemed appropriate and review results as they become available (see orders for details and above listed lab/test results). I will order psychiatric records from previous [facility] hospitals to further elucidate the nature of patient's psychopathology and review once available. I will gather additional collateral information from friends, family and o/p treatment team to further elucidate the nature of patient's psychopathology and baselline level of psychiatric functioning. I certify that this patient's inpatient psychiatric hospital services furnished since the previous certification were, and continue to be, required for treatment that could reasonably be expected to improve the patient's condition, or for diagnostic study, and that the patient continues to need, on a daily basis, active treatment furnished directly by or requiring the supervision of inpatient psychiatric facility personnel. In addition, the hospital records show that services furnished were intensive treatment services, admission or related services, or equivalent services.     EXPECTED DISCHARGE DATE/DAY: TBD     DISPOSITION: Home       Signed By:   Hannah Stokes MD  2/6/2017

## 2017-02-06 NOTE — PROGRESS NOTES
Laboratory Monitoring for Valproic Acid    This patient is currently prescribed the following medication(s):   Current Facility-Administered Medications   Medication Dose Route Frequency    risperiDONE (RisperDAL) 1 mg/mL oral solution soln 2 mg  2 mg Oral BID    Or    haloperidol lactate (HALDOL) injection 1 mg +++COURT ORDERED MEDICATION FOR REFUSAL OF PO RISPERIDIONE+++  1 mg IntraMUSCular BID    valproic acid (as sodium salt) (DEPAKENE) 250 mg/5 mL (5 mL) oral solution 500 mg  500 mg Oral TID    Or    haloperidol lactate (HALDOL) injection 2 mg +++COURT ORDERED MEDICATION FOR REFUSAL OF PO VALPROIC ACID+++  2 mg IntraMUSCular TID    clonazePAM (KlonoPIN) tablet 1 mg  1 mg Oral TID    metoprolol tartrate (LOPRESSOR) tablet 25 mg  25 mg Oral Q12H    aspirin (ASPIRIN) tablet 325 mg  325 mg Oral DAILY    cholecalciferol (VITAMIN D3) tablet 4,000 Units  4,000 Units Oral DAILY    pantoprazole (PROTONIX) tablet 40 mg  40 mg Oral ACB    atorvastatin (LIPITOR) tablet 40 mg  40 mg Oral QHS    insulin lispro (HUMALOG) injection   SubCUTAneous ACB&D       The following labs have been completed for monitoring of valproic acid:    Valproic Acid Serum Concentration  Lab Results   Component Value Date/Time    Valproic acid 64 02/06/2017 04:46 AM       Assessment/Plan:  Valproic acid level within therapeutic limits, 64 mcg/mL. Level drawn appropriately after 4 days of therapy. Same dose continued.          Narciso Smalls, PharmD, BCPS  [phone]

## 2017-02-06 NOTE — BH NOTES
Alert and oriented to all spheres. Pt.'s personal hygiene skills and grooming habits are slightly poor; dishelved. Encouraged to shower; refused. During the shift this pt. have been mostly isolative to self in own room. Visible in the milieu only to have needs met. Affect/Mood:  Flat. Pt. denies presence of A/V hallucinations and S/H ideations. Offers support and anticipates needs. Q-15 minute checks maintained per Hospital protocol.

## 2017-02-06 NOTE — BH NOTES
Pt visible on the unit, isolated in her room, pt didn't attend groups or activities. Pt offered no self disclosures, no interaction with peers noted. Pt denies S/I H/I A/H, affect flat and blunt, no anxiety noted at this time. Pt remain on Q 15 min checks for safety, will monitor and offer support when needed.

## 2017-02-06 NOTE — BH NOTES
GROUP THERAPY PROGRESS NOTE    Ulises Mills is participating in [place].      Group time: 30 minutes    Personal goal for participation: reality orientation    Goal orientation: social    Group therapy participation: minimal    Therapeutic interventions reviewed and discussed: yes    Impression of participation: no problem

## 2017-02-06 NOTE — PROGRESS NOTES
Problem: Altered Thought Process (Adult/Pediatric)  Goal: *STG: Remains safe in hospital  Outcome: Progressing Towards Goal  Pt has been resting quietly in bed with eyes closed,  no signs of any distress during this shift. Pt will continue to be monitored Q 15 minute for safety and needs. Pt slept about 7 hours. Labs obtained.

## 2017-02-07 LAB
GLUCOSE BLD STRIP.AUTO-MCNC: 100 MG/DL (ref 65–100)
GLUCOSE BLD STRIP.AUTO-MCNC: 113 MG/DL (ref 65–100)
GLUCOSE BLD STRIP.AUTO-MCNC: 135 MG/DL (ref 65–100)
SERVICE CMNT-IMP: ABNORMAL
SERVICE CMNT-IMP: ABNORMAL
SERVICE CMNT-IMP: NORMAL

## 2017-02-07 PROCEDURE — 74011250637 HC RX REV CODE- 250/637: Performed by: PSYCHIATRY & NEUROLOGY

## 2017-02-07 PROCEDURE — 74011250637 HC RX REV CODE- 250/637: Performed by: INTERNAL MEDICINE

## 2017-02-07 PROCEDURE — 65220000003 HC RM SEMIPRIVATE PSYCH

## 2017-02-07 PROCEDURE — 74011250637 HC RX REV CODE- 250/637

## 2017-02-07 PROCEDURE — 82962 GLUCOSE BLOOD TEST: CPT

## 2017-02-07 RX ADMIN — ACETAMINOPHEN 650 MG: 325 TABLET ORAL at 18:29

## 2017-02-07 RX ADMIN — VALPROIC ACID 500 MG: 250 SOLUTION ORAL at 12:43

## 2017-02-07 RX ADMIN — CLONAZEPAM 1 MG: 1 TABLET ORAL at 17:24

## 2017-02-07 RX ADMIN — Medication 2 MG: at 07:54

## 2017-02-07 RX ADMIN — METOPROLOL TARTRATE 25 MG: 25 TABLET ORAL at 08:00

## 2017-02-07 RX ADMIN — VALPROIC ACID 500 MG: 250 SOLUTION ORAL at 07:54

## 2017-02-07 RX ADMIN — CLONAZEPAM 1 MG: 1 TABLET ORAL at 12:42

## 2017-02-07 RX ADMIN — CLONAZEPAM 1 MG: 1 TABLET ORAL at 07:55

## 2017-02-07 RX ADMIN — VALPROIC ACID 500 MG: 250 SOLUTION ORAL at 17:23

## 2017-02-07 RX ADMIN — Medication 2 MG: at 17:24

## 2017-02-07 RX ADMIN — PANTOPRAZOLE SODIUM 40 MG: 40 TABLET, DELAYED RELEASE ORAL at 06:24

## 2017-02-07 RX ADMIN — VITAMIN D, TAB 1000IU (100/BT) 4000 UNITS: 25 TAB at 07:54

## 2017-02-07 RX ADMIN — ASPIRIN 325 MG ORAL TABLET 325 MG: 325 PILL ORAL at 07:55

## 2017-02-07 NOTE — BH NOTES
[place] who [place] [facility]    Elvin Valderrama  2/7/2017  12:09 PM

## 2017-02-07 NOTE — PROGRESS NOTES
Problem: Altered Thought Process (Adult/Pediatric)  Goal: *STG: Complies with medication therapy  Outcome: Progressing Towards Goal  Pt blood sugar was 110 at dinner time. Accepted scheduled medications. Remained quiet in the dayroom and in her room most of the evening. At 2130 demanding to change the room and kept arguing with the nurse. Pt was redirected and later settled and went to bed.

## 2017-02-07 NOTE — BH NOTES
PSYCHIATRIC PROGRESS NOTE         Patient Name  Katheryn Grajeda   Date of Birth 1960   [facility] [id number]   Medical Record Number  [id number]      Age  49 y.o. PCP None   Admit date:  1/27/2017    Room Number  957/11  @ [address]   Date of Service  2/7/2017          PSYCHOTHERAPY SESSION NOTE:  Length of psychotherapy session: 20 minutes    Main condition/diagnosis/issues treated during session today, 2/7/2017 : raeann, tx, psychosis, denial, mood    I employed Cognitive Behavioral therapy techniques, Reality-Oriented psychotherapy, as well as supportive psychotherapy in regards to various ongoing psychosocial stressors, including the following: pre-admission and current problems; housing issues; occupational issues; medical issues; and stress of hospitalization. Interpersonal relationship issues and psychodynamic conflicts explored. Attempts made to alleviate maladaptive patterns. Overall, patient is minially progressing    Treatment Plan Update (reviewed an updated 2/7/2017) : I will modify psychotherapy tx plan by implementing more stress management strategies, building upon cognitive behavioral techniques, increasing coping skills, as well as shoring up psychological defenses). An extended energy and skill set was needed to engage pt in psychotherapy due to some of the following: resistiveness, complexity, negativity, confrontational nature, hostile behaviors, and/or severe abnormalities in thought processes/psychosis resulting in the loss of expressive/receptive language communication skills. E & M PROGRESS NOTE:         HISTORY       CC:  \"i don't have a mental illness\"  HISTORY OF PRESENT ILLNESS/INTERVAL HISTORY:  (reviewed/updated 2/7/2017). per initial evaluation: The patient, Katheryn Grajeda, is a 49 y.o.  WHITE OR   WHITE OR  female with a past psychiatric history significant for bipolar vs schizophrenia, who presents at this time with complaints of (and/or evidence of) the following emotional symptoms: psychotic behavior, paranoid behavior, raeann, psychosis and catatonia. Additional symptomatology include loud, hyperverbal, combative, demanding, entitled, anger outbursts, difficulty sleeping and hearing voices. The above symptoms have been present for a week. These symptoms are of severe severity. These symptoms are constant in nature. The patient's condition has been precipitated by psychosocial stressors. Patient's condition made worse by treatment noncompliance. UDS +negative; BAL=0. Pt given IM psych meds on adm due to severe combativeness. Ovidio Anibal presents/reports/evidences the following emotional symptoms today, 2/7/2017:raeann and psychosis. The above symptoms have been present for weeks. These symptoms are of severe severity. The symptoms are constant in nature. Additional symptomatology and features include severe disorganization of thought processes, odd demeanor, labile, paranoid, bizarre behaviors, aud north--internally preoc, less agitated and irritable today. SIDE EFFECTS: (reviewed/updated 2/7/2017)  None reported or admitted to. No noted toxicity with use of Depakote   ALLERGIES:(reviewed/updated 2/7/2017)  Allergies   Allergen Reactions    Augmentin [Amoxicillin-Pot Clavulanate] Rash    Codeine Nausea Only    Cogentin [Benztropine] Other (comments)     Makes her sick    Iodine Other (comments)    Macrodantin [Nitrofurantoin Macrocrystalline] Rash    Morphine Shortness of Breath    Mucinex [Guaifenesin] Shortness of Breath    Oxycodone Other (comments)    Prednisone Anxiety      MEDICATIONS PRIOR TO ADMISSION:(reviewed/updated 2/7/2017)  Prescriptions Prior to Admission   Medication Sig    metoprolol tartrate (LOPRESSOR) 25 mg tablet Take 0.5 Tabs by mouth every twelve (12) hours. Indications: HYPERTENSION    risperiDONE (RISPERDAL) 2 mg tablet Take 1 Tab by mouth two (2) times a day.  Indications: BIPOLAR DISORDER IN REMISSION, SCHIZOPHRENIA    DULoxetine (CYMBALTA) 30 mg capsule Take 1 Cap by mouth nightly. Indications: ANXIETY WITH DEPRESSION, DEPRESSION    DULoxetine (CYMBALTA) 60 mg capsule Take 1 Cap by mouth daily. Indications: ANXIETY WITH DEPRESSION, DEPRESSION    aspirin (ASPIRIN) 325 mg tablet Take 325 mg by mouth daily. Indications: MYOCARDIAL INFARCTION PREVENTION    hydrOXYzine (ATARAX) 50 mg tablet Take 50 mg by mouth every six (6) hours as needed for Anxiety. Indications: ANXIETY    melatonin 3 mg tablet Take 3 mg by mouth nightly. Indications: INSOMNIA    omeprazole (PRILOSEC) 20 mg capsule Take 20 mg by mouth daily. Indications: HEARTBURN    simvastatin (ZOCOR) 40 mg tablet Take 40 mg by mouth nightly. Indications: HYPERCHOLESTEROLEMIA    cholecalciferol, vitamin D3, 2,000 unit tab Take 8,000 Int'l Units by mouth daily. Indications: PREVENTION OF VITAMIN D DEFICIENCY    magnesium oxide (MAG-OX) 400 mg tablet Take 400 mg by mouth daily. Indications: HYPOMAGNESEMIA      PAST MEDICAL HISTORY: Past medical history from the initial psychiatric evaluation has been reviewed (reviewed/updated 2/7/2017) with no additional updates (I asked patient and no additional past medical history provided). Past Medical History   Diagnosis Date    Anxiety     Asthma     Bipolar affective disorder ([address]     Chronic back pain     CVA (cerebral vascular accident) ([address]     Diabetes mellitus, type 2 ([address]     Dyslipidemia     Fibromyalgia     GERD (gastroesophageal reflux disease)     Hypertension     Schizophrenia ([address]      Past Surgical History   Procedure Laterality Date    Hx knee arthroscopy      Hx bunionectomy      Hx cholecystectomy      Hx tubal ligation      Hx hysterectomy      Hx other surgical       orbit blow out repair.       SOCIAL HISTORY: Social history from the initial psychiatric evaluation has been reviewed (reviewed/updated 2/7/2017) with no additional updates (I asked patient and no additional social history provided). Social History     Social History    Marital status: SINGLE     Spouse name: N/A    Number of children: N/A    Years of education: N/A     Occupational History    Not on file. Social History Main Topics    Smoking status: Current Every Day Smoker     Types: Cigarettes    Smokeless tobacco: Not on file    Alcohol use Not on file    Drug use: Yes     Special: Opiates    Sexual activity: Not on file     Other Topics Concern    Not on file     Social History Narrative    [place] . 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 [employer]. HS diploma. Has one son, age 39. FAMILY HISTORY: Family history from the initial psychiatric evaluation has been reviewed (reviewed/updated 2/7/2017) with no additional updates (I asked patient and no additional family history provided). History reviewed. No pertinent family history. REVIEW OF SYSTEMS: (reviewed/updated 2/7/2017)  Appetite:improved   Sleep: improved   All other Review of Systems: Respiratory ROS: no cough, shortness of breath, or wheezing  Cardiovascular ROS: no chest pain or dyspnea on exertion  Gastrointestinal ROS: no abdominal pain, change in bowel habits, or black or bloody stools  Neurological ROS: no TIA or stroke symptoms         [facility] ([facility]):    [facility] FINDINGS ARE WITHIN NORMAL LIMITS (WNL) UNLESS OTHERWISE STATED BELOW. ( ALL OF THE BELOW CATEGORIES OF THE [facility] HAVE BEEN REVIEWED (reviewed 2/7/2017) AND UPDATED AS DEEMED APPROPRIATE )  General Presentation disheveled and overweight, evasive, guarded, uncooperative and vague   Orientation disorganized   Vital Signs  See below (reviewed 2/7/2017); Vital Signs (BP, Pulse, & Temp) are within normal limits if not listed below.    Gait and Station Stable/steady, no ataxia   Musculoskeletal System No extrapyramidal symptoms (EPS); no abnormal muscular movements or Tardive Dyskinesia (TD); muscle strength and tone are within normal limits   Language No aphasia or dysarthria   Speech:  wnl   Thought Processes llogical; slow rate of thoughts; poor abstract reasoning/computation   Thought Associations blocked  and goal directed   Thought Content Paranoid and odd delusions, decreased aud north   Suicidal Ideations no plan , no intention and none   Homicidal Ideations none   Mood:  euthymic and irritable   Affect:  Irritable, euthymic, and odd demeanor   Memory recent  impaired   Memory remote:  fair   Concentration/Attention:  fair   Fund of Knowledge below average   Insight:  limited   Reliability fair   Judgment:  limited          VITALS:     Patient Vitals for the past 24 hrs:   Temp Pulse Resp BP   02/07/17 1235 - (!) 114 16 143/79   02/07/17 0637 96.9 °F (36.1 °C) 87 18 144/86     Wt Readings from Last 3 Encounters:   01/31/17 91.7 kg (202 lb 2 oz)   07/07/16 96.8 kg (213 lb 6.4 oz)     Temp Readings from Last 3 Encounters:   02/07/17 96.9 °F (36.1 °C)   07/08/16 98.2 °F (36.8 °C)     BP Readings from Last 3 Encounters:   02/07/17 143/79   07/08/16 119/60     Pulse Readings from Last 3 Encounters:   02/07/17 (!) 114   07/08/16 94            DATA     LABORATORY DATA:(reviewed/updated 2/7/2017)  Recent Results (from the past 24 hour(s))   GLUCOSE, POC    Collection Time: 02/07/17  7:53 AM   Result Value Ref Range    Glucose (POC) 100 65 - 100 mg/dL    Performed by [address] [place] [address], POC    Collection Time: 02/07/17  1:44 PM   Result Value Ref Range    Glucose (POC) 135 (H) 65 - 100 mg/dL    Performed by [address] [place] [address], POC    Collection Time: 02/07/17  4:51 PM   Result Value Ref Range    Glucose (POC) 113 (H) 65 - 100 mg/dL    Performed by Murtaza Swan      Lab Results   Component Value Date/Time    Valproic acid 64 02/06/2017 04:46 AM     No results found for: LI, LIH, LITHM   RADIOLOGY REPORTS:(reviewed/updated 2/7/2017)  No results found.        MEDICATIONS ALL MEDICATIONS:   Current Facility-Administered Medications   Medication Dose Route Frequency    risperiDONE (RisperDAL) 1 mg/mL oral solution soln 2 mg  2 mg Oral BID    Or    haloperidol lactate (HALDOL) injection 1 mg +++COURT ORDERED MEDICATION FOR REFUSAL OF PO RISPERIDIONE+++  1 mg IntraMUSCular BID    valproic acid (as sodium salt) (DEPAKENE) 250 mg/5 mL (5 mL) oral solution 500 mg  500 mg Oral TID    Or    haloperidol lactate (HALDOL) injection 2 mg +++COURT ORDERED MEDICATION FOR REFUSAL OF PO VALPROIC ACID+++  2 mg IntraMUSCular TID    clonazePAM (KlonoPIN) tablet 1 mg  1 mg Oral TID    metoprolol tartrate (LOPRESSOR) tablet 25 mg  25 mg Oral Q12H    cloNIDine HCl (CATAPRES) tablet 0.2 mg  0.2 mg Oral Q6H PRN    aspirin (ASPIRIN) tablet 325 mg  325 mg Oral DAILY    cholecalciferol (VITAMIN D3) tablet 4,000 Units  4,000 Units Oral DAILY    pantoprazole (PROTONIX) tablet 40 mg  40 mg Oral ACB    atorvastatin (LIPITOR) tablet 40 mg  40 mg Oral QHS    insulin lispro (HUMALOG) injection   SubCUTAneous ACB&D    glucose chewable tablet 16 g  4 Tab Oral PRN    dextrose (D50W) injection syrg 12.5-25 g  12.5-25 g IntraVENous PRN    glucagon (GLUCAGEN) injection 1 mg  1 mg IntraMUSCular PRN    ziprasidone (GEODON) 20 mg in sterile water (preservative free) 1 mL injection  20 mg IntraMUSCular BID PRN    OLANZapine (ZyPREXA) tablet 5 mg  5 mg Oral Q6H PRN    LORazepam (ATIVAN) injection 2 mg  2 mg IntraMUSCular Q4H PRN    LORazepam (ATIVAN) tablet 1 mg  1 mg Oral Q4H PRN    zolpidem (AMBIEN) tablet 10 mg  10 mg Oral QHS PRN    acetaminophen (TYLENOL) tablet 650 mg  650 mg Oral Q4H PRN    ibuprofen (MOTRIN) tablet 400 mg  400 mg Oral Q8H PRN    magnesium hydroxide (MILK OF MAGNESIA) 400 mg/5 mL oral suspension 30 mL  30 mL Oral DAILY PRN    nicotine (NICODERM CQ) 21 mg/24 hr patch 1 Patch  1 Patch TransDERmal DAILY PRN      SCHEDULED MEDICATIONS:   Current Facility-Administered Medications Medication Dose Route Frequency    risperiDONE (RisperDAL) 1 mg/mL oral solution soln 2 mg  2 mg Oral BID    Or    haloperidol lactate (HALDOL) injection 1 mg +++COURT ORDERED MEDICATION FOR REFUSAL OF PO RISPERIDIONE+++  1 mg IntraMUSCular BID    valproic acid (as sodium salt) (DEPAKENE) 250 mg/5 mL (5 mL) oral solution 500 mg  500 mg Oral TID    Or    haloperidol lactate (HALDOL) injection 2 mg +++COURT ORDERED MEDICATION FOR REFUSAL OF PO VALPROIC ACID+++  2 mg IntraMUSCular TID    clonazePAM (KlonoPIN) tablet 1 mg  1 mg Oral TID    metoprolol tartrate (LOPRESSOR) tablet 25 mg  25 mg Oral Q12H    aspirin (ASPIRIN) tablet 325 mg  325 mg Oral DAILY    cholecalciferol (VITAMIN D3) tablet 4,000 Units  4,000 Units Oral DAILY    pantoprazole (PROTONIX) tablet 40 mg  40 mg Oral ACB    atorvastatin (LIPITOR) tablet 40 mg  40 mg Oral QHS    insulin lispro (HUMALOG) injection   SubCUTAneous ACB&D          ASSESSMENT & PLAN     DIAGNOSES REQUIRING ACTIVE TREATMENT AND MONITORING: (reviewed/updated 2/7/2017)  Patient Active Hospital Problem List:     schizoaffective dis vs schizophrenia, doubt bipolar dis  Assessment:  Will cont to secure old recs  Mildly better, still psychotic and labile. Discovered to be cheeking her meds last week, now taking them as directed, dep wnl. Prognosis is extremely poor, apparently pt with a very poor baseline level of psych functioning with numerous long term i/p hospitalization. She does not take meds on an o/p basis contributing to her poor baseline. Collateral info from \"[facility] [place] [address] PACT Team (phone: [phone]; on call phone: [phone]; fax: [phone]) to provide a treatment update and obtain additional collateral information. Clinician shared that pt was recently opened to their PACT team on 1/9/17 after 5 repeated psychiatric hospitalizations since July 2016.  Clinician shared pt was hospitalized at the following facilities: [facility] from 7/4/16-7/8/16 and [address] [place] [address] from 7/9/16-7/21/16, 9/12/16-11/4/16, 11/9/16-11/14/16, and most recent hospitalization was for almost two months from 11/15/16-1/4/17. Clinician stated PACT team has had limited contact with pt since she was opened to their team. Clinician shared that pt did not answer her door on several occasions when PACT tried to visit. Clinician stated that pt was referred to PACT for her inability to manage her own medications in the community. \"  Plan: I will cont to adjust psych meds. I will monitor blood levels (Depakote---a drug with a narrow therapeutic index= NTI) and associated labs for drug therapy implemented that require intense monitoring for toxicity as deemed appropriate base on current medication side effects and pharmacodynamically determined drug 1/2 lives. Last Dep level =64 after 4 days. Essential hypertension (7/6/2016)  Assessment: still elevated  Plan: cont to adjust bp meds    Catatonic state ([facility] (1/27/2017)  Assessment:  now resolved  Plan: tx underlying psych etiology. Monitor for malignant catatonia, dc APs if vitals escalate      In summary, Raphael Sever, is a 49 y.o.  female who presents with a severe exacerbation of the principal diagnosis of Bipolar affective, mixed, sev w/ psych [facility])  Patient's condition is worsening/not improving/not stable . Patient requires continued inpatient hospitalization for further stabilization, safety monitoring and medication management. I will continue to coordinate the provision of individual, milieu, occupational, group, and substance abuse therapies to address target symptoms/diagnoses as deemed appropriate for the individual patient. A coordinated, multidisplinary treatment team round was conducted with the patient (this team consists of the nurse, psychiatric unit pharmcist,  and writer).      Complete current electronic health record for patient has been reviewed today including consultant notes, ancillary staff notes, nurses and psychiatric tech notes. Suicide risk assessment completed and patient deemed to be of low risk for suicide at this time. The following regarding medications was addressed during rounds with patient:   the risks and benefits of the proposed medication. The patient was given the opportunity to ask questions. Informed consent given to the use of the above medications. Will continue to adjust psychiatric and non-psychiatric medications (see above \"medication\" section and orders section for details) as deemed appropriate & based upon diagnoses and response to treatment. I will continue to order blood tests/labs and diagnostic tests as deemed appropriate and review results as they become available (see orders for details and above listed lab/test results). I will order psychiatric records from previous [facility] hospitals to further elucidate the nature of patient's psychopathology and review once available. I will gather additional collateral information from friends, family and o/p treatment team to further elucidate the nature of patient's psychopathology and baselline level of psychiatric functioning. I certify that this patient's inpatient psychiatric hospital services furnished since the previous certification were, and continue to be, required for treatment that could reasonably be expected to improve the patient's condition, or for diagnostic study, and that the patient continues to need, on a daily basis, active treatment furnished directly by or requiring the supervision of inpatient psychiatric facility personnel. In addition, the hospital records show that services furnished were intensive treatment services, admission or related services, or equivalent services.     EXPECTED DISCHARGE DATE/DAY: TBD     DISPOSITION: Home       Signed By:   Mayra Maharaj MD  2/7/2017

## 2017-02-07 NOTE — PROGRESS NOTES
Problem: Altered Thought Process (Adult/Pediatric)  Goal: *STG: Remains safe in hospital  Outcome: Progressing Towards Goal  Pt slept until 0500,  no signs of any distress during this shift. Pt will continue to be monitored Q 15 minute for safety and needs. Pt slept about 6 hours.

## 2017-02-07 NOTE — BH NOTES
Pt was received in her room resting this morning at the start of shift. Pt is meal and med compliant. Pt isolates in her room a lot and does not attend groups. Staff will continue to monitor pt Q 15 min checks for safety and support.

## 2017-02-08 LAB
GLUCOSE BLD STRIP.AUTO-MCNC: 106 MG/DL (ref 65–100)
GLUCOSE BLD STRIP.AUTO-MCNC: 118 MG/DL (ref 65–100)
SERVICE CMNT-IMP: ABNORMAL
SERVICE CMNT-IMP: ABNORMAL

## 2017-02-08 PROCEDURE — 82962 GLUCOSE BLOOD TEST: CPT

## 2017-02-08 PROCEDURE — 74011250637 HC RX REV CODE- 250/637

## 2017-02-08 PROCEDURE — 74011250637 HC RX REV CODE- 250/637: Performed by: INTERNAL MEDICINE

## 2017-02-08 PROCEDURE — 74011250637 HC RX REV CODE- 250/637: Performed by: PSYCHIATRY & NEUROLOGY

## 2017-02-08 PROCEDURE — 65220000003 HC RM SEMIPRIVATE PSYCH

## 2017-02-08 RX ADMIN — PANTOPRAZOLE SODIUM 40 MG: 40 TABLET, DELAYED RELEASE ORAL at 06:14

## 2017-02-08 RX ADMIN — CLONAZEPAM 1 MG: 1 TABLET ORAL at 08:28

## 2017-02-08 RX ADMIN — METOPROLOL TARTRATE 25 MG: 25 TABLET ORAL at 21:29

## 2017-02-08 RX ADMIN — ACETAMINOPHEN 650 MG: 325 TABLET ORAL at 04:32

## 2017-02-08 RX ADMIN — Medication 2 MG: at 16:43

## 2017-02-08 RX ADMIN — VITAMIN D, TAB 1000IU (100/BT) 4000 UNITS: 25 TAB at 08:28

## 2017-02-08 RX ADMIN — ATORVASTATIN CALCIUM 40 MG: 40 TABLET, FILM COATED ORAL at 21:29

## 2017-02-08 RX ADMIN — VALPROIC ACID 500 MG: 250 SOLUTION ORAL at 16:43

## 2017-02-08 RX ADMIN — CLONAZEPAM 1 MG: 1 TABLET ORAL at 12:09

## 2017-02-08 RX ADMIN — VALPROIC ACID 500 MG: 250 SOLUTION ORAL at 08:28

## 2017-02-08 RX ADMIN — VALPROIC ACID 500 MG: 250 SOLUTION ORAL at 12:09

## 2017-02-08 RX ADMIN — METOPROLOL TARTRATE 25 MG: 25 TABLET ORAL at 08:28

## 2017-02-08 RX ADMIN — ASPIRIN 325 MG ORAL TABLET 325 MG: 325 PILL ORAL at 08:28

## 2017-02-08 RX ADMIN — Medication 2 MG: at 08:28

## 2017-02-08 RX ADMIN — CLONAZEPAM 1 MG: 1 TABLET ORAL at 16:43

## 2017-02-08 NOTE — BH NOTES
Pt visible on the unit for meals and meds, isolates in her room, no interaction noted with peers or staff. Pt pleasant upon approach offering no self disclosures. Pt attended no schedule groups or activities, behaviorally compliant and easily redirected. No racial comments noted, no S/I H/I A/H presented, pt affect flat , mood depressed and decrease anxiety. Pt remain on Q 15 min checks for safety, will continue monitoring and offer support when needed.

## 2017-02-08 NOTE — PROGRESS NOTES
Problem: Altered Thought Process (Adult/Pediatric)  Goal: *STG: Seeks staff when feelings of anxiety and fear arise  Outcome: Progressing Towards Goal  Pt is alert and oriented, affect flat. Pt is preoccupied and anxious. Pt denies si/hi and contracts for safety. Pt is focused on being released by the court. Stated she was anxious because she quit smoking which led to admit. Pt has poor insight. Assist to reality test, assess mood and behavior, encourage to verbalize thoughts and feeling, med and illness teaching, encourage participation in tx plan, observe on q15' checks.

## 2017-02-08 NOTE — BH NOTES
PSYCHIATRIC PROGRESS NOTE         Patient Name  Tawanna Mchugh   Date of Birth 1960   [facility] [id number]   Medical Record Number  [id number]      Age  49 y.o. PCP None   Admit date:  1/27/2017    Room Number  063/67  @ [facility]   Date of Service  2/8/2017          PSYCHOTHERAPY SESSION NOTE:  Length of psychotherapy session: 20 minutes    Main condition/diagnosis/issues treated during session today, 2/8/2017 : raeann, tx, psychosis, denial, mood    I employed Cognitive Behavioral therapy techniques, Reality-Oriented psychotherapy, as well as supportive psychotherapy in regards to various ongoing psychosocial stressors, including the following: pre-admission and current problems; housing issues; occupational issues; medical issues; and stress of hospitalization. Interpersonal relationship issues and psychodynamic conflicts explored. Attempts made to alleviate maladaptive patterns. Overall, patient is mildly progressing    Treatment Plan Update (reviewed an updated 2/8/2017) : I will modify psychotherapy tx plan by implementing more stress management strategies, building upon cognitive behavioral techniques, increasing coping skills, as well as shoring up psychological defenses). E & M PROGRESS NOTE:         HISTORY       CC:  \"i'm not afraid of the dark anymore\"  HISTORY OF PRESENT ILLNESS/INTERVAL HISTORY:  (reviewed/updated 2/8/2017). per initial evaluation: The patient, Tawanna Mchugh, is a 49 y.o. WHITE OR   WHITE OR  female with a past psychiatric history significant for bipolar vs schizophrenia, who presents at this time with complaints of (and/or evidence of) the following emotional symptoms: psychotic behavior, paranoid behavior, raeann, psychosis and catatonia. Additional symptomatology include loud, hyperverbal, combative, demanding, entitled, anger outbursts, difficulty sleeping and hearing voices.  The above symptoms have been present for a week. These symptoms are of severe severity. These symptoms are constant in nature. The patient's condition has been precipitated by psychosocial stressors. Patient's condition made worse by treatment noncompliance. UDS negative; BAL=0. Pt given IM psych meds on adm due to severe combativeness. Raphael Sever presents/reports/evidences the following emotional symptoms today, 2/8/2017:raeann and psychosis. The above symptoms have been present for weeks. These symptoms are of severe severity. The symptoms are constant in nature. Additional symptomatology and features include severe disorganization of thought processes, odd demeanor, labile, paranoid, bizarre behaviors, aud north decreased, less agitated and irritable. No insight. SIDE EFFECTS: (reviewed/updated 2/8/2017)  None reported or admitted to. No noted toxicity with use of Depakote   ALLERGIES:(reviewed/updated 2/8/2017)  Allergies   Allergen Reactions    Augmentin [Amoxicillin-Pot Clavulanate] Rash    Codeine Nausea Only    Cogentin [Benztropine] Other (comments)     Makes her sick    Iodine Other (comments)    Macrodantin [Nitrofurantoin Macrocrystalline] Rash    Morphine Shortness of Breath    Mucinex [Guaifenesin] Shortness of Breath    Oxycodone Other (comments)    Prednisone Anxiety      MEDICATIONS PRIOR TO ADMISSION:(reviewed/updated 2/8/2017)  Prescriptions Prior to Admission   Medication Sig    metoprolol tartrate (LOPRESSOR) 25 mg tablet Take 0.5 Tabs by mouth every twelve (12) hours. Indications: HYPERTENSION    risperiDONE (RISPERDAL) 2 mg tablet Take 1 Tab by mouth two (2) times a day. Indications: BIPOLAR DISORDER IN REMISSION, SCHIZOPHRENIA    DULoxetine (CYMBALTA) 30 mg capsule Take 1 Cap by mouth nightly. Indications: ANXIETY WITH DEPRESSION, DEPRESSION    DULoxetine (CYMBALTA) 60 mg capsule Take 1 Cap by mouth daily.  Indications: ANXIETY WITH DEPRESSION, DEPRESSION    aspirin (ASPIRIN) 325 mg tablet Take 325 mg by mouth daily. Indications: MYOCARDIAL INFARCTION PREVENTION    hydrOXYzine (ATARAX) 50 mg tablet Take 50 mg by mouth every six (6) hours as needed for Anxiety. Indications: ANXIETY    melatonin 3 mg tablet Take 3 mg by mouth nightly. Indications: INSOMNIA    omeprazole (PRILOSEC) 20 mg capsule Take 20 mg by mouth daily. Indications: HEARTBURN    simvastatin (ZOCOR) 40 mg tablet Take 40 mg by mouth nightly. Indications: HYPERCHOLESTEROLEMIA    cholecalciferol, vitamin D3, 2,000 unit tab Take 8,000 Int'l Units by mouth daily. Indications: PREVENTION OF VITAMIN D DEFICIENCY    magnesium oxide (MAG-OX) 400 mg tablet Take 400 mg by mouth daily. Indications: HYPOMAGNESEMIA      PAST MEDICAL HISTORY: Past medical history from the initial psychiatric evaluation has been reviewed (reviewed/updated 2/8/2017) with no additional updates (I asked patient and no additional past medical history provided). Past Medical History   Diagnosis Date    Anxiety     Asthma     B[facility]     Chronic back pain     CVA (cerebral vascular accident) ([facility]     Diabetes mellitus, type 2 ([address]     Dyslipidemia     Fibromyalgia     GERD (gastroesophageal reflux disease)     Hypertension     Schizophrenia ([facility] [address]      Past Surgical History   Procedure Laterality Date    Hx knee arthroscopy      Hx bunionectomy      Hx cholecystectomy      Hx tubal ligation      Hx hysterectomy      Hx other surgical       orbit blow out repair. SOCIAL HISTORY: Social history from the initial psychiatric evaluation has been reviewed (reviewed/updated 2/8/2017) with no additional updates (I asked patient and no additional social history provided). Social History     Social History    Marital status: SINGLE     Spouse name: N/A    Number of children: N/A    Years of education: N/A     Occupational History    Not on file.      Social History Main Topics    Smoking status: Current Every Day Smoker Types: Cigarettes    Smokeless tobacco: Not on file    Alcohol use Not on file    Drug use: Yes     Special: Opiates    Sexual activity: Not on file     Other Topics Concern    Not on file     Social History Narrative    [place] . 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 [employer] diploma. Has one son, age 39. FAMILY HISTORY: Family history from the initial psychiatric evaluation has been reviewed (reviewed/updated 2/8/2017) with no additional updates (I asked patient and no additional family history provided). History reviewed. No pertinent family history. REVIEW OF SYSTEMS: (reviewed/updated 2/8/2017)  Appetite:improved   Sleep: improved   All other Review of Systems: Respiratory ROS: no cough, shortness of breath, or wheezing  Cardiovascular ROS: no chest pain or dyspnea on exertion  Gastrointestinal ROS: no abdominal pain, change in bowel habits, or black or bloody stools  Neurological ROS: no TIA or stroke symptoms         [facility] ([facility]):    [facility] FINDINGS ARE WITHIN NORMAL LIMITS (WNL) UNLESS OTHERWISE STATED BELOW. ( ALL OF THE BELOW CATEGORIES OF THE [facility] HAVE BEEN REVIEWED (reviewed 2/8/2017) AND UPDATED AS DEEMED APPROPRIATE )  General Presentation disheveled and overweight, evasive, guarded, uncooperative and vague   Orientation disorganized   Vital Signs  See below (reviewed 2/8/2017); Vital Signs (BP, Pulse, & Temp) are within normal limits if not listed below.    Gait and Station Stable/steady, no ataxia   Musculoskeletal System No extrapyramidal symptoms (EPS); no abnormal muscular movements or Tardive Dyskinesia (TD); muscle strength and tone are within normal limits   Language No aphasia or dysarthria   Speech:  wnl   Thought Processes llogical; slow rate of thoughts; poor abstract reasoning/computation   Thought Associations blocked  and goal directed   Thought Content Paranoid and odd delusions, decreased aud north   Suicidal Ideations no plan , no intention and none   Homicidal Ideations none   Mood:  euthymic and irritable   Affect:  euthymic, and odd demeanor   Memory recent  impaired   Memory remote:  fair   Concentration/Attention:  fair   Fund of Knowledge below average   Insight:  Limited, poor   Reliability fair   Judgment:  limited          VITALS:     No data found. Wt Readings from Last 3 Encounters:   01/31/17 91.7 kg (202 lb 2 oz)   07/07/16 96.8 kg (213 lb 6.4 oz)     Temp Readings from Last 3 Encounters:   02/07/17 96.9 °F (36.1 °C)   07/08/16 98.2 °F (36.8 °C)     BP Readings from Last 3 Encounters:   02/07/17 143/79   07/08/16 119/60     Pulse Readings from Last 3 Encounters:   02/07/17 (!) 114   07/08/16 94            DATA     LABORATORY DATA:(reviewed/updated 2/8/2017)  Recent Results (from the past 24 hour(s))   GLUCOSE, POC    Collection Time: 02/08/17  9:16 AM   Result Value Ref Range    Glucose (POC) 118 (H) 65 - 100 mg/dL    Performed by Demaris Million    GLUCOSE, POC    Collection Time: 02/08/17  4:41 PM   Result Value Ref Range    Glucose (POC) 106 (H) 65 - 100 mg/dL    Performed by Lucetta Chaseburg      Lab Results   Component Value Date/Time    Valproic acid 64 02/06/2017 04:46 AM     No results found for: LI, LIH, LITHM   RADIOLOGY REPORTS:(reviewed/updated 2/8/2017)  No results found.        MEDICATIONS     ALL MEDICATIONS:   Current Facility-Administered Medications   Medication Dose Route Frequency    risperiDONE (RisperDAL) 1 mg/mL oral solution soln 2 mg  2 mg Oral BID    Or    haloperidol lactate (HALDOL) injection 1 mg +++COURT ORDERED MEDICATION FOR REFUSAL OF PO RISPERIDIONE+++  1 mg IntraMUSCular BID    valproic acid (as sodium salt) (DEPAKENE) 250 mg/5 mL (5 mL) oral solution 500 mg  500 mg Oral TID    Or    haloperidol lactate (HALDOL) injection 2 mg +++COURT ORDERED MEDICATION FOR REFUSAL OF PO VALPROIC ACID+++  2 mg IntraMUSCular TID    clonazePAM (KlonoPIN) tablet 1 mg  1 mg Oral TID    metoprolol tartrate (LOPRESSOR) tablet 25 mg  25 mg Oral Q12H    cloNIDine HCl (CATAPRES) tablet 0.2 mg  0.2 mg Oral Q6H PRN    aspirin (ASPIRIN) tablet 325 mg  325 mg Oral DAILY    cholecalciferol (VITAMIN D3) tablet 4,000 Units  4,000 Units Oral DAILY    pantoprazole (PROTONIX) tablet 40 mg  40 mg Oral ACB    atorvastatin (LIPITOR) tablet 40 mg  40 mg Oral QHS    insulin lispro (HUMALOG) injection   SubCUTAneous ACB&D    glucose chewable tablet 16 g  4 Tab Oral PRN    dextrose (D50W) injection syrg 12.5-25 g  12.5-25 g IntraVENous PRN    glucagon (GLUCAGEN) injection 1 mg  1 mg IntraMUSCular PRN    ziprasidone (GEODON) 20 mg in sterile water (preservative free) 1 mL injection  20 mg IntraMUSCular BID PRN    OLANZapine (ZyPREXA) tablet 5 mg  5 mg Oral Q6H PRN    LORazepam (ATIVAN) injection 2 mg  2 mg IntraMUSCular Q4H PRN    LORazepam (ATIVAN) tablet 1 mg  1 mg Oral Q4H PRN    zolpidem (AMBIEN) tablet 10 mg  10 mg Oral QHS PRN    acetaminophen (TYLENOL) tablet 650 mg  650 mg Oral Q4H PRN    ibuprofen (MOTRIN) tablet 400 mg  400 mg Oral Q8H PRN    magnesium hydroxide (MILK OF MAGNESIA) 400 mg/5 mL oral suspension 30 mL  30 mL Oral DAILY PRN    nicotine (NICODERM CQ) 21 mg/24 hr patch 1 Patch  1 Patch TransDERmal DAILY PRN      SCHEDULED MEDICATIONS:   Current Facility-Administered Medications   Medication Dose Route Frequency    risperiDONE (RisperDAL) 1 mg/mL oral solution soln 2 mg  2 mg Oral BID    Or    haloperidol lactate (HALDOL) injection 1 mg +++COURT ORDERED MEDICATION FOR REFUSAL OF PO RISPERIDIONE+++  1 mg IntraMUSCular BID    valproic acid (as sodium salt) (DEPAKENE) 250 mg/5 mL (5 mL) oral solution 500 mg  500 mg Oral TID    Or    haloperidol lactate (HALDOL) injection 2 mg +++COURT ORDERED MEDICATION FOR REFUSAL OF PO VALPROIC ACID+++  2 mg IntraMUSCular TID    clonazePAM (KlonoPIN) tablet 1 mg  1 mg Oral TID    metoprolol tartrate (LOPRESSOR) tablet 25 mg  25 mg Oral Q12H    aspirin (ASPIRIN) tablet 325 mg  325 mg Oral DAILY    cholecalciferol (VITAMIN D3) tablet 4,000 Units  4,000 Units Oral DAILY    pantoprazole (PROTONIX) tablet 40 mg  40 mg Oral ACB    atorvastatin (LIPITOR) tablet 40 mg  40 mg Oral QHS    insulin lispro (HUMALOG) injection   SubCUTAneous ACB&D          ASSESSMENT & PLAN     DIAGNOSES REQUIRING ACTIVE TREATMENT AND MONITORING: (reviewed/updated 2/8/2017)  Patient Active Hospital Problem List:     schizoaffective dis vs schizophrenia, doubt bipolar dis  Assessment:  Will cont to secure old recs  Mildly better, still psychotic and labile. Discovered to be cheeking her meds last week, now taking them as directed, dep wnl. Prognosis is extremely poor, apparently pt with a very poor baseline level of psych functioning with numerous long term i/p hospitalization. She does not take meds on an o/p basis contributing to her poor baseline. Collateral info from \"[facility] [place] [address] PACT Team (phone: [phone]; on call phone: [phone]; fax: [phone]) to provide a treatment update and obtain additional collateral information. Clinician shared that pt was recently opened to their PACT team on 1/9/17 after 5 repeated psychiatric hospitalizations since July 2016. Clinician shared pt was hospitalized at the following facilities: [facility] from 7/4/16-7/8/16 and [address] [place] [address] from 7/9/16-7/21/16, 9/12/16-11/4/16, 11/9/16-11/14/16, and most recent hospitalization was for almost two months from 11/15/16-1/4/17. Clinician stated PACT team has had limited contact with pt since she was opened to their team. Clinician shared that pt did not answer her door on several occasions when PACT tried to visit. Clinician stated that pt was referred to PACT for her inability to manage her own medications in the community. \"  Plan: I will cont to adjust psych meds.   I will monitor blood levels (Depakote---a drug with a narrow therapeutic index= NTI) and associated labs for drug therapy implemented that require intense monitoring for toxicity as deemed appropriate base on current medication side effects and pharmacodynamically determined drug 1/2 lives. Last Dep level =64 after 4 days. Essential hypertension (7/6/2016)  Assessment: still elevated  Plan: cont to adjust bp meds    Catatonic state ([address] (1/27/2017)  Assessment:  now resolved  Plan: tx underlying psych etiology. Monitor for malignant catatonia, dc APs if vitals escalate      In summary, Jie Duncan, is a 49 y.o.  female who presents with a severe exacerbation of the principal diagnosis of Bipolar affective, mixed, sev w/ psych [facility])  Patient's condition is worsening/not improving/not stable . Patient requires continued inpatient hospitalization for further stabilization, safety monitoring and medication management. I will continue to coordinate the provision of individual, milieu, occupational, group, and substance abuse therapies to address target symptoms/diagnoses as deemed appropriate for the individual patient. A coordinated, multidisplinary treatment team round was conducted with the patient (this team consists of the nurse, psychiatric unit pharmcist,  and writer). Complete current electronic health record for patient has been reviewed today including consultant notes, ancillary staff notes, nurses and psychiatric tech notes. Suicide risk assessment completed and patient deemed to be of low risk for suicide at this time. The following regarding medications was addressed during rounds with patient:   the risks and benefits of the proposed medication. The patient was given the opportunity to ask questions. Informed consent given to the use of the above medications.  Will continue to adjust psychiatric and non-psychiatric medications (see above \"medication\" section and orders section for details) as deemed appropriate & based upon diagnoses and response to treatment. I will continue to order blood tests/labs and diagnostic tests as deemed appropriate and review results as they become available (see orders for details and above listed lab/test results). I will order psychiatric records from previous [facility] hospitals to further elucidate the nature of patient's psychopathology and review once available. I will gather additional collateral information from friends, family and o/p treatment team to further elucidate the nature of patient's psychopathology and baselline level of psychiatric functioning. I certify that this patient's inpatient psychiatric hospital services furnished since the previous certification were, and continue to be, required for treatment that could reasonably be expected to improve the patient's condition, or for diagnostic study, and that the patient continues to need, on a daily basis, active treatment furnished directly by or requiring the supervision of inpatient psychiatric facility personnel. In addition, the hospital records show that services furnished were intensive treatment services, admission or related services, or equivalent services. EXPECTED DISCHARGE DATE/DAY:  Monday/tues     DISPOSITION: Home with PACT       Signed By:   Ronald Nguyễn MD  2/8/2017

## 2017-02-09 LAB
GLUCOSE BLD STRIP.AUTO-MCNC: 107 MG/DL (ref 65–100)
GLUCOSE BLD STRIP.AUTO-MCNC: 90 MG/DL (ref 65–100)
SERVICE CMNT-IMP: ABNORMAL
SERVICE CMNT-IMP: NORMAL

## 2017-02-09 PROCEDURE — 65220000003 HC RM SEMIPRIVATE PSYCH

## 2017-02-09 PROCEDURE — 82962 GLUCOSE BLOOD TEST: CPT

## 2017-02-09 PROCEDURE — 74011250637 HC RX REV CODE- 250/637: Performed by: INTERNAL MEDICINE

## 2017-02-09 PROCEDURE — 74011250637 HC RX REV CODE- 250/637: Performed by: PSYCHIATRY & NEUROLOGY

## 2017-02-09 RX ORDER — CLONAZEPAM 0.5 MG/1
0.5 TABLET ORAL 3 TIMES DAILY
Status: DISCONTINUED | OUTPATIENT
Start: 2017-02-09 | End: 2017-02-10

## 2017-02-09 RX ADMIN — CLONAZEPAM 1 MG: 1 TABLET ORAL at 07:40

## 2017-02-09 RX ADMIN — VITAMIN D, TAB 1000IU (100/BT) 4000 UNITS: 25 TAB at 07:40

## 2017-02-09 RX ADMIN — VALPROIC ACID 500 MG: 250 SOLUTION ORAL at 07:40

## 2017-02-09 RX ADMIN — CLONAZEPAM 0.5 MG: 0.5 TABLET ORAL at 17:59

## 2017-02-09 RX ADMIN — PANTOPRAZOLE SODIUM 40 MG: 40 TABLET, DELAYED RELEASE ORAL at 06:13

## 2017-02-09 RX ADMIN — VALPROIC ACID 500 MG: 250 SOLUTION ORAL at 11:43

## 2017-02-09 RX ADMIN — ASPIRIN 325 MG ORAL TABLET 325 MG: 325 PILL ORAL at 07:40

## 2017-02-09 RX ADMIN — CLONAZEPAM 0.5 MG: 0.5 TABLET ORAL at 11:43

## 2017-02-09 RX ADMIN — METOPROLOL TARTRATE 25 MG: 25 TABLET ORAL at 21:35

## 2017-02-09 RX ADMIN — ATORVASTATIN CALCIUM 40 MG: 40 TABLET, FILM COATED ORAL at 21:35

## 2017-02-09 RX ADMIN — Medication 2 MG: at 07:40

## 2017-02-09 RX ADMIN — VALPROIC ACID 500 MG: 250 SOLUTION ORAL at 17:59

## 2017-02-09 RX ADMIN — Medication 2 MG: at 17:59

## 2017-02-09 RX ADMIN — METOPROLOL TARTRATE 25 MG: 25 TABLET ORAL at 08:00

## 2017-02-09 NOTE — BH NOTES
GROUP THERAPY PROGRESS NOTE    The patient Villa montes 49 y.o. female is participating in Reflections Group    Group time: 30 minutes    Personal goal for participation: To discuss the daily goals    Goal orientation: personal    Group therapy participation: passive    Therapeutic interventions reviewed and discussed:  Yes    Impression of participation: fair Lunette Castleman  2/8/2017  10:00 PM

## 2017-02-09 NOTE — BH NOTES
GROUP THERAPY PROGRESS NOTE    The patient Ulises Mills a 56 y.o. female is participating in Creative Expression Group. Group time: 1 hour    Personal goal for participation: To concentrate on selected task    Goal orientation: social    Group therapy participation: active    Therapeutic interventions reviewed and discussed: Crafts, games, music    Impression of participation: The patient was attentive.     Stacy Zarate  2/9/2017  5:07 PM

## 2017-02-09 NOTE — PROGRESS NOTES
Problem: Altered Thought Process (Adult/Pediatric)  Goal: *STG: Complies with medication therapy  Outcome: Progressing Towards Goal  Pt is alert and oriented. Compliant with meals and medications. Denies SI/HI or A/V hallucinations. No behavior management issues. Anticipated discharge scheduled for tomorrow. Social work to contact [facility] to make aware.

## 2017-02-09 NOTE — PROGRESS NOTES
Problem: Altered Thought Process (Adult/Pediatric)  Goal: *STG: Remains safe in hospital  Client better this pm.  Going back and forth to the dayroom, talking with select peers and staff. No cursing or agitation noted. Meal and med compliant, q 15 min checks for safety continue.   Hygiene fair at best.

## 2017-02-09 NOTE — BH NOTES
PSYCHIATRIC PROGRESS NOTE         Patient Name  Tawanna Mchugh   Date of Birth 1960   [facility] [id number]   Medical Record Number  [id number]      Age  56 y.o. PCP None   Admit date:  1/27/2017    Room Number  139/76  @ [address]   Date of Service  2/9/2017          PSYCHOTHERAPY SESSION NOTE:  Length of psychotherapy session: 20 minutes    Main condition/diagnosis/issues treated during session today, 2/9/2017 : raeann, tx, psychosis, denial, mood    I employed Cognitive Behavioral therapy techniques, Reality-Oriented psychotherapy, as well as supportive psychotherapy in regards to various ongoing psychosocial stressors, including the following: pre-admission and current problems; housing issues; occupational issues; medical issues; and stress of hospitalization. Interpersonal relationship issues and psychodynamic conflicts explored. Attempts made to alleviate maladaptive patterns. Overall, patient is mildly progressing    Treatment Plan Update (reviewed an updated 2/9/2017) : I will modify psychotherapy tx plan by implementing more stress management strategies, building upon cognitive behavioral techniques, increasing coping skills, as well as shoring up psychological defenses). E & M PROGRESS NOTE:         HISTORY       CC:  \"i'm not afraid of the dark anymore\"  HISTORY OF PRESENT ILLNESS/INTERVAL HISTORY:  (reviewed/updated 2/9/2017). per initial evaluation: The patient, Tawanna Mchugh, is a 49 y.o. WHITE OR   WHITE OR  female with a past psychiatric history significant for bipolar vs schizophrenia, who presents at this time with complaints of (and/or evidence of) the following emotional symptoms: psychotic behavior, paranoid behavior, raeann, psychosis and catatonia. Additional symptomatology include loud, hyperverbal, combative, demanding, entitled, anger outbursts, difficulty sleeping and hearing voices.  The above symptoms have been present for a week. These symptoms are of severe severity. These symptoms are constant in nature. The patient's condition has been precipitated by psychosocial stressors. Patient's condition made worse by treatment noncompliance. UDS negative; BAL=0. Pt given IM psych meds on adm due to severe combativeness. Daune Letters presents/reports/evidences the following emotional symptoms today, 2/9/2017:raeann and psychosis. The above symptoms have been present for weeks. These symptoms are of severe severity. The symptoms are constant in nature. Additional symptomatology and features include mild disorganization of thought processes, much less of an odd demeanor, less labile, no sig paranoid/bizarre behaviors, no sig aud, less agitated and irritable. No insight. SIDE EFFECTS: (reviewed/updated 2/9/2017)  None reported or admitted to. No noted toxicity with use of Depakote   ALLERGIES:(reviewed/updated 2/9/2017)  Allergies   Allergen Reactions    Augmentin [Amoxicillin-Pot Clavulanate] Rash    Codeine Nausea Only    Cogentin [Benztropine] Other (comments)     Makes her sick    Iodine Other (comments)    Macrodantin [Nitrofurantoin Macrocrystalline] Rash    Morphine Shortness of Breath    Mucinex [Guaifenesin] Shortness of Breath    Oxycodone Other (comments)    Prednisone Anxiety      MEDICATIONS PRIOR TO ADMISSION:(reviewed/updated 2/9/2017)  Prescriptions Prior to Admission   Medication Sig    metoprolol tartrate (LOPRESSOR) 25 mg tablet Take 0.5 Tabs by mouth every twelve (12) hours. Indications: HYPERTENSION    risperiDONE (RISPERDAL) 2 mg tablet Take 1 Tab by mouth two (2) times a day. Indications: BIPOLAR DISORDER IN REMISSION, SCHIZOPHRENIA    DULoxetine (CYMBALTA) 30 mg capsule Take 1 Cap by mouth nightly. Indications: ANXIETY WITH DEPRESSION, DEPRESSION    DULoxetine (CYMBALTA) 60 mg capsule Take 1 Cap by mouth daily.  Indications: ANXIETY WITH DEPRESSION, DEPRESSION    aspirin (ASPIRIN) 325 mg tablet Take 325 mg by mouth daily. Indications: MYOCARDIAL INFARCTION PREVENTION    hydrOXYzine (ATARAX) 50 mg tablet Take 50 mg by mouth every six (6) hours as needed for Anxiety. Indications: ANXIETY    melatonin 3 mg tablet Take 3 mg by mouth nightly. Indications: INSOMNIA    omeprazole (PRILOSEC) 20 mg capsule Take 20 mg by mouth daily. Indications: HEARTBURN    simvastatin (ZOCOR) 40 mg tablet Take 40 mg by mouth nightly. Indications: HYPERCHOLESTEROLEMIA    cholecalciferol, vitamin D3, 2,000 unit tab Take 8,000 Int'l Units by mouth daily. Indications: PREVENTION OF VITAMIN D DEFICIENCY    magnesium oxide (MAG-OX) 400 mg tablet Take 400 mg by mouth daily. Indications: HYPOMAGNESEMIA      PAST MEDICAL HISTORY: Past medical history from the initial psychiatric evaluation has been reviewed (reviewed/updated 2/9/2017) with no additional updates (I asked patient and no additional past medical history provided). Past Medical History   Diagnosis Date    Anxiety     Asthma     Bipolar affective disorder ([address]     Chronic back pain     CVA (cerebral vascular accident) ([address]     Diabetes mellitus, type 2 ([address]     Dyslipidemia     Fibromyalgia     GERD (gastroesophageal reflux disease)     Hypertension     Schizophrenia ([address]      Past Surgical History   Procedure Laterality Date    Hx knee arthroscopy      Hx bunionectomy      Hx cholecystectomy      Hx tubal ligation      Hx hysterectomy      Hx other surgical       orbit blow out repair. SOCIAL HISTORY: Social history from the initial psychiatric evaluation has been reviewed (reviewed/updated 2/9/2017) with no additional updates (I asked patient and no additional social history provided). Social History     Social History    Marital status: SINGLE     Spouse name: N/A    Number of children: N/A    Years of education: N/A     Occupational History    Not on file.      Social History Main Topics    Smoking status: Current Every Day Smoker     Types: Cigarettes    Smokeless tobacco: Not on file    Alcohol use Not on file    Drug use: Yes     Special: Opiates    Sexual activity: Not on file     Other Topics Concern    Not on file     Social History Narrative    [place] . 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 [employer] HS diploma. Has one son, age 39. FAMILY HISTORY: Family history from the initial psychiatric evaluation has been reviewed (reviewed/updated 2/9/2017) with no additional updates (I asked patient and no additional family history provided). History reviewed. No pertinent family history. REVIEW OF SYSTEMS: (reviewed/updated 2/9/2017)  Appetite:improved   Sleep: improved   All other Review of Systems: Respiratory ROS: no cough, shortness of breath, or wheezing  Cardiovascular ROS: no chest pain or dyspnea on exertion  Gastrointestinal ROS: no abdominal pain, change in bowel habits, or black or bloody stools  Neurological ROS: no TIA or stroke symptoms         [facility] ([facility]):    [facility] FINDINGS ARE WITHIN NORMAL LIMITS (WNL) UNLESS OTHERWISE STATED BELOW. ( ALL OF THE BELOW CATEGORIES OF THE [facility] HAVE BEEN REVIEWED (reviewed 2/9/2017) AND UPDATED AS DEEMED APPROPRIATE )  General Presentation disheveled and overweight, evasive, guarded, uncooperative and vague   Orientation disorganized   Vital Signs  See below (reviewed 2/9/2017); Vital Signs (BP, Pulse, & Temp) are within normal limits if not listed below.    Gait and Station Stable/steady, no ataxia   Musculoskeletal System No extrapyramidal symptoms (EPS); no abnormal muscular movements or Tardive Dyskinesia (TD); muscle strength and tone are within normal limits   Language No aphasia or dysarthria   Speech:  wnl   Thought Processes logical; slow rate of thoughts; poor abstract reasoning/computation   Thought Associations Log and goal directed   Thought Content Paranoid and odd delusions mostly resolved--none expressed   Suicidal Ideations no plan , no intention and none   Homicidal Ideations none   Mood:  euthymic and irritable, sl labile   Affect:  euthymic, full   Memory recent  impaired   Memory remote:  fair   Concentration/Attention:  fair   Fund of Knowledge below average   Insight:  Limited, poor   Reliability fair   Judgment:  limited          VITALS:     Patient Vitals for the past 24 hrs:   Temp Pulse Resp BP   02/09/17 1309 - 78 18 96/63     Wt Readings from Last 3 Encounters:   01/31/17 91.7 kg (202 lb 2 oz)   07/07/16 96.8 kg (213 lb 6.4 oz)     Temp Readings from Last 3 Encounters:   07/08/16 98.2 °F (36.8 °C)     BP Readings from Last 3 Encounters:   02/09/17 96/63   07/08/16 119/60     Pulse Readings from Last 3 Encounters:   02/09/17 78   07/08/16 94            DATA     LABORATORY DATA:(reviewed/updated 2/9/2017)  Recent Results (from the past 24 hour(s))   GLUCOSE, POC    Collection Time: 02/09/17  7:39 AM   Result Value Ref Range    Glucose (POC) 90 65 - 100 mg/dL    Performed by [address] [place] [address], POC    Collection Time: 02/09/17  4:19 PM   Result Value Ref Range    Glucose (POC) 107 (H) 65 - 100 mg/dL    Performed by Trudy Frias      Lab Results   Component Value Date/Time    Valproic acid 64 02/06/2017 04:46 AM     No results found for: LI, LIH, LITHM   RADIOLOGY REPORTS:(reviewed/updated 2/9/2017)  No results found.        MEDICATIONS     ALL MEDICATIONS:   Current Facility-Administered Medications   Medication Dose Route Frequency    clonazePAM (KlonoPIN) tablet 0.5 mg  0.5 mg Oral TID    risperiDONE (RisperDAL) 1 mg/mL oral solution soln 2 mg  2 mg Oral BID    Or    haloperidol lactate (HALDOL) injection 1 mg +++COURT ORDERED MEDICATION FOR REFUSAL OF PO RISPERIDIONE+++  1 mg IntraMUSCular BID    valproic acid (as sodium salt) (DEPAKENE) 250 mg/5 mL (5 mL) oral solution 500 mg  500 mg Oral TID    Or    haloperidol lactate (HALDOL) injection 2 mg +++COURT ORDERED MEDICATION FOR REFUSAL OF PO VALPROIC ACID+++  2 mg IntraMUSCular TID    metoprolol tartrate (LOPRESSOR) tablet 25 mg  25 mg Oral Q12H    cloNIDine HCl (CATAPRES) tablet 0.2 mg  0.2 mg Oral Q6H PRN    aspirin (ASPIRIN) tablet 325 mg  325 mg Oral DAILY    cholecalciferol (VITAMIN D3) tablet 4,000 Units  4,000 Units Oral DAILY    pantoprazole (PROTONIX) tablet 40 mg  40 mg Oral ACB    atorvastatin (LIPITOR) tablet 40 mg  40 mg Oral QHS    insulin lispro (HUMALOG) injection   SubCUTAneous ACB&D    glucose chewable tablet 16 g  4 Tab Oral PRN    dextrose (D50W) injection syrg 12.5-25 g  12.5-25 g IntraVENous PRN    glucagon (GLUCAGEN) injection 1 mg  1 mg IntraMUSCular PRN    ziprasidone (GEODON) 20 mg in sterile water (preservative free) 1 mL injection  20 mg IntraMUSCular BID PRN    OLANZapine (ZyPREXA) tablet 5 mg  5 mg Oral Q6H PRN    LORazepam (ATIVAN) injection 2 mg  2 mg IntraMUSCular Q4H PRN    LORazepam (ATIVAN) tablet 1 mg  1 mg Oral Q4H PRN    zolpidem (AMBIEN) tablet 10 mg  10 mg Oral QHS PRN    acetaminophen (TYLENOL) tablet 650 mg  650 mg Oral Q4H PRN    ibuprofen (MOTRIN) tablet 400 mg  400 mg Oral Q8H PRN    magnesium hydroxide (MILK OF MAGNESIA) 400 mg/5 mL oral suspension 30 mL  30 mL Oral DAILY PRN    nicotine (NICODERM CQ) 21 mg/24 hr patch 1 Patch  1 Patch TransDERmal DAILY PRN      SCHEDULED MEDICATIONS:   Current Facility-Administered Medications   Medication Dose Route Frequency    clonazePAM (KlonoPIN) tablet 0.5 mg  0.5 mg Oral TID    risperiDONE (RisperDAL) 1 mg/mL oral solution soln 2 mg  2 mg Oral BID    Or    haloperidol lactate (HALDOL) injection 1 mg +++COURT ORDERED MEDICATION FOR REFUSAL OF PO RISPERIDIONE+++  1 mg IntraMUSCular BID    valproic acid (as sodium salt) (DEPAKENE) 250 mg/5 mL (5 mL) oral solution 500 mg  500 mg Oral TID    Or    haloperidol lactate (HALDOL) injection 2 mg +++COURT ORDERED MEDICATION FOR REFUSAL OF PO VALPROIC ACID+++  2 mg IntraMUSCular TID    metoprolol tartrate (LOPRESSOR) tablet 25 mg  25 mg Oral Q12H    aspirin (ASPIRIN) tablet 325 mg  325 mg Oral DAILY    cholecalciferol (VITAMIN D3) tablet 4,000 Units  4,000 Units Oral DAILY    pantoprazole (PROTONIX) tablet 40 mg  40 mg Oral ACB    atorvastatin (LIPITOR) tablet 40 mg  40 mg Oral QHS    insulin lispro (HUMALOG) injection   SubCUTAneous ACB&D          ASSESSMENT & PLAN     DIAGNOSES REQUIRING ACTIVE TREATMENT AND MONITORING: (reviewed/updated 2/9/2017)  Patient Active Hospital Problem List:     schizoaffective dis vs  bipolar dis  Assessment:  Will cont to secure old recs  moderately better, much less psychotic and no sig manic issues, but likely close to baseline. Discovered to be cheeking her meds last week, now taking them as directed, dep wnl. Prognosis is extremely poor, apparently pt with a very poor baseline level of psych functioning with numerous long term i/p hospitalization. She does not take meds on an o/p basis contributing to her poor baseline. Collateral info from "[facility] [place] [address] PACT Team (phone: [phone]; on call phone: [phone]; fax: [phone]) to provide a treatment update and obtain additional collateral information. Clinician shared that pt was recently opened to their PACT team on 1/9/17 after 5 repeated psychiatric hospitalizations since July 2016. Clinician shared pt was hospitalized at the following facilities: [facility] from 7/4/16-7/8/16 and [address] [place] [address] from 7/9/16-7/21/16, 9/12/16-11/4/16, 11/9/16-11/14/16, and most recent hospitalization was for almost two months from 11/15/16-1/4/17. Clinician stated PACT team has had limited contact with pt since she was opened to their team. Clinician shared that pt did not answer her door on several occasions when PACT tried to visit.  Clinician stated that pt was referred to PACT for her inability to manage her own medications in the community. \"  Plan: I will cont to adjust psych meds. I will monitor blood levels (Depakote---a drug with a narrow therapeutic index= NTI) and associated labs for drug therapy implemented that require intense monitoring for toxicity as deemed appropriate base on current medication side effects and pharmacodynamically determined drug 1/2 lives. Last Dep level =64 after 4 days. Essential hypertension (7/6/2016)  Assessment: stable  Plan: cont to adjust bp meds    Catatonic state ([address] (1/27/2017)  Assessment:  now resolved  Plan: tx underlying psych etiology. Monitor for malignant catatonia, dc APs if vitals escalate      In summary, Cornelio Fofana, is a 56 y.o.  female who presents with a severe exacerbation of the principal diagnosis of Bipolar affective, mixed, sev w/ psych [facility])  Patient's condition is worsening/not improving/not stable . Patient requires continued inpatient hospitalization for further stabilization, safety monitoring and medication management. I will continue to coordinate the provision of individual, milieu, occupational, group, and substance abuse therapies to address target symptoms/diagnoses as deemed appropriate for the individual patient. A coordinated, multidisplinary treatment team round was conducted with the patient (this team consists of the nurse, psychiatric unit pharmcist,  and writer). Complete current electronic health record for patient has been reviewed today including consultant notes, ancillary staff notes, nurses and psychiatric tech notes. Suicide risk assessment completed and patient deemed to be of low risk for suicide at this time. The following regarding medications was addressed during rounds with patient:   the risks and benefits of the proposed medication. The patient was given the opportunity to ask questions. Informed consent given to the use of the above medications.  Will continue to adjust psychiatric and non-psychiatric medications (see above \"medication\" section and orders section for details) as deemed appropriate & based upon diagnoses and response to treatment. I will continue to order blood tests/labs and diagnostic tests as deemed appropriate and review results as they become available (see orders for details and above listed lab/test results). I will order psychiatric records from previous [facility] hospitals to further elucidate the nature of patient's psychopathology and review once available. I will gather additional collateral information from friends, family and o/p treatment team to further elucidate the nature of patient's psychopathology and baselline level of psychiatric functioning. I certify that this patient's inpatient psychiatric hospital services furnished since the previous certification were, and continue to be, required for treatment that could reasonably be expected to improve the patient's condition, or for diagnostic study, and that the patient continues to need, on a daily basis, active treatment furnished directly by or requiring the supervision of inpatient psychiatric facility personnel. In addition, the hospital records show that services furnished were intensive treatment services, admission or related services, or equivalent services.     EXPECTED DISCHARGE DATE/DAY: Friday      DISPOSITION: Home with PACT       Signed By:   Zhou Kim MD  2/9/2017

## 2017-02-09 NOTE — BH NOTES
GROUP THERAPY PROGRESS NOTE    Jie Duncan is participating in [place].      Group time: 30 minutes    Personal goal for participation: daily orientation    Goal orientation: personal    Group therapy participation: minimal    Therapeutic interventions reviewed and discussed: yes    Impression of participation:needed prompting

## 2017-02-10 VITALS
BODY MASS INDEX: 37.19 KG/M2 | DIASTOLIC BLOOD PRESSURE: 63 MMHG | HEART RATE: 78 BPM | RESPIRATION RATE: 18 BRPM | TEMPERATURE: 96.9 F | WEIGHT: 202.13 LBS | SYSTOLIC BLOOD PRESSURE: 96 MMHG | HEIGHT: 62 IN

## 2017-02-10 LAB
GLUCOSE BLD STRIP.AUTO-MCNC: 102 MG/DL (ref 65–100)
GLUCOSE BLD STRIP.AUTO-MCNC: 109 MG/DL (ref 65–100)
SERVICE CMNT-IMP: ABNORMAL
SERVICE CMNT-IMP: ABNORMAL

## 2017-02-10 PROCEDURE — 82962 GLUCOSE BLOOD TEST: CPT

## 2017-02-10 PROCEDURE — 74011250637 HC RX REV CODE- 250/637: Performed by: INTERNAL MEDICINE

## 2017-02-10 PROCEDURE — 74011250637 HC RX REV CODE- 250/637: Performed by: PSYCHIATRY & NEUROLOGY

## 2017-02-10 RX ORDER — RISPERIDONE 4 MG/1
4 TABLET, FILM COATED ORAL
Qty: 14 TAB | Refills: 1 | Status: SHIPPED | OUTPATIENT
Start: 2017-02-10

## 2017-02-10 RX ORDER — DIVALPROEX SODIUM 500 MG/1
1500 TABLET, EXTENDED RELEASE ORAL
Qty: 42 TAB | Refills: 1 | Status: SHIPPED | OUTPATIENT
Start: 2017-02-10

## 2017-02-10 RX ADMIN — Medication 2 MG: at 08:13

## 2017-02-10 RX ADMIN — Medication 2 MG: at 16:40

## 2017-02-10 RX ADMIN — ASPIRIN 325 MG ORAL TABLET 325 MG: 325 PILL ORAL at 08:13

## 2017-02-10 RX ADMIN — VALPROIC ACID 500 MG: 250 SOLUTION ORAL at 08:13

## 2017-02-10 RX ADMIN — VALPROIC ACID 500 MG: 250 SOLUTION ORAL at 11:12

## 2017-02-10 RX ADMIN — VALPROIC ACID 500 MG: 250 SOLUTION ORAL at 16:40

## 2017-02-10 RX ADMIN — METOPROLOL TARTRATE 25 MG: 25 TABLET ORAL at 08:12

## 2017-02-10 RX ADMIN — CLONAZEPAM 0.5 MG: 0.5 TABLET ORAL at 08:11

## 2017-02-10 RX ADMIN — PANTOPRAZOLE SODIUM 40 MG: 40 TABLET, DELAYED RELEASE ORAL at 06:16

## 2017-02-10 RX ADMIN — VITAMIN D, TAB 1000IU (100/BT) 4000 UNITS: 25 TAB at 08:11

## 2017-02-10 NOTE — BH NOTES
Patient being discharged to home. Patient stated that she was happy to be being discharged, seen smiling. Patient instructed to take medications as prescribed and attend all follow up appointments as directed. Patient verbalized understanding. Discharge instructions, prescriptions, and belongings with patient. Valuables returned to patient. Patient escorted off unit by staff member, transportation via yellow cab. Patient will continue plan of care outpatient and follow up as directed.

## 2017-02-10 NOTE — BH NOTES
LEISURE GROUP THERAPY PROGRESS NOTE    The patient Ada montes 56 y.o. female is participating in [facility]. Group time: 45 minutes    Personal goal for participation: Daily social interactions with other peers & staff.     Goal orientation: Relaxation activities    Group therapy participation: minimal    Therapeutic interventions reviewed and discussed:  Yes    Impression of participation: limited    Hetal Rush  2/10/2017  1:37 PM

## 2017-02-10 NOTE — DISCHARGE SUMMARY
PSYCHIATRIC DISCHARGE SUMMARY         IDENTIFICATION:    Patient Name  Ulises Mills   Date of Birth 1960   [facility] [id number]   Medical Record Number  [id number]      Age  56 y.o. PCP None   Admit date:  1/27/2017    Discharge date: 2/10/2017   Room Number  316/02  @ [address]   Date of Service  2/10/2017            TYPE OF DISCHARGE: REGULAR               CONDITION AT DISCHARGE: stable       PROVISIONAL & DISCHARGE DIAGNOSES:    Problem List  Date Reviewed: 7/6/2016          Codes Class    Schizoaffective disorder, bipolar type ([facility] ICD-10-CM: F25.0  ICD-9-CM: 295.70         Catatonic state ([facility] ICD-10-CM: F06.1  ICD-9-CM: 781.99, 295.20         * (Principal)Bipolar affective, mixed, sev w/ psych [facility]) ICD-10-CM: F31.64  ICD-9-CM: 296.64         Essential hypertension ICD-10-CM: I10  ICD-9-CM: 401.9         Bipolar affective psychosis ([facility] ICD-10-CM: F31.9  ICD-9-CM: 296.80               Active Hospital Problems    Schizoaffective disorder, bipolar type ([facility]      Catatonic state ([facility]      *Bipolar affective, mixed, sev w/ psych ([facility]      Essential hypertension        DISCHARGE DIAGNOSIS:   Axis I:  SEE ABOVE  Axis II: SEE ABOVE  Axis III: SEE ABOVE  Axis IV:  lack of structure  Axis V:  10 on admission, 60 on discharge      CC & HISTORY OF PRESENT ILLNESS:  The patient, Ulises Mills, is a 49 y.o. WHITE OR   WHITE OR  female with a past psychiatric history significant for bipolar vs schizophrenia, who presents at this time with complaints of (and/or evidence of) the following emotional symptoms: psychotic behavior, paranoid behavior, raeann, psychosis and catatonia. Additional symptomatology include loud, hyperverbal, combative, demanding, entitled, anger outbursts, difficulty sleeping and hearing voices. The above symptoms have been present for a week. These symptoms are of severe severity. These symptoms are constant in nature.  The patient's condition has been precipitated by psychosocial stressors. Patient's condition made worse by treatment noncompliance. UDS negative; BAL=0. Pt given IM psych meds on adm due to severe combativeness. SOCIAL HISTORY:    Social History     Social History    Marital status: SINGLE     Spouse name: N/A    Number of children: N/A    Years of education: N/A     Occupational History    Not on file. Social History Main Topics    Smoking status: Current Every Day Smoker     Types: Cigarettes    Smokeless tobacco: Not on file    Alcohol use Not on file    Drug use: Yes     Special: Opiates    Sexual activity: Not on file     Other Topics Concern    Not on file     Social History Narrative    Lives in a trailer alone, in [place]. . 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 [employer]. HS diploma. Has one son, age 39. FAMILY HISTORY:   History reviewed. No pertinent family history. HOSPITALIZATION COURSE:    Tawanna Mchugh was admitted to the inpatient psychiatric unit [facility] for acute psychiatric stabilization in regards to symptomatology as described in the HPI above. The differential diagnosis at time of admission included: bipolar dis vs. schizoaffective vs. Schizophrenia. While on the unit Tawanna Mchugh was involved in individual, group, occupational and milieu therapy. Psychiatric medications were adjusted during this hospitalization including depakote and risperdal.   Tawanna Mchugh demonstrated a slow, but progressive improvement in overall condition. Please see individual progress notes for more specific details regarding patient's hospitalization course. At time of discharge, Tawanna Mchugh is without significant problems of depression, subtle features of psychosis (as manifested by subtle abnormalities in thought processes) and no raeann.  Patient free of suicidal and homicidal ideations (appears to be at very low risk of suicide or homicide) and reports many positive predictive factors in terms of not attempting suicide or homicide. Overall presentation at time of discharge is most consistent with the diagnosis of schizoaffective dis vs bipolar disorder. Patient with request for discharge today. Patient has maximized benefit to be derived from acute inpatient psychiatric treatment. All members of the treatment team concur with each other in regards to plans for discharge today per patient's request.  Patient aware and in agreement with discharge and discharge plan. Per my last note:   schizoaffective dis vs bipolar dis  Assessment: Will cont to secure old recs  moderately better, much less psychotic and no sig manic issues, but likely close to baseline. Discovered to be cheeking her meds last week, now taking them as directed, dep wnl. Prognosis is extremely poor, apparently pt with a very poor baseline level of psych functioning with numerous long term i/p hospitalization. She does not take meds on an o/p basis contributing to her poor baseline. Collateral info from "Rosey garcia [place] [address] PACT Team (phone: [phone]; on call phone: [phone]; fax: [phone]) to provide a treatment update and obtain additional collateral information. Clinician shared that pt was recently opened to their PACT team on 1/9/17 after 5 repeated psychiatric hospitalizations since July 2016. Clinician shared pt was hospitalized at the following facilities: [facility] from 7/4/16-7/8/16 and [address] [place] [address] from 7/9/16-7/21/16, 9/12/16-11/4/16, 11/9/16-11/14/16, and most recent hospitalization was for almost two months from 11/15/16-1/4/17. Clinician stated PACT team has had limited contact with pt since she was opened to their team. Clinician shared that pt did not answer her door on several occasions when PACT tried to visit.  Clinician stated that pt was referred to PACT for her inability to manage her own medications in the community. \"  Plan: I will cont to adjust psych meds. I will monitor blood levels (Depakote---a drug with a narrow therapeutic index= NTI) and associated labs for drug therapy implemented that require intense monitoring for toxicity as deemed appropriate base on current medication side effects and pharmacodynamically determined drug 1/2 lives. Last Dep level =64 after 4 days. Essential hypertension (7/6/2016)  Assessment: stable  Plan: cont to adjust bp meds    Catatonic state ([facility] (1/27/2017)  Assessment: now resolved  Plan: tx underlying psych etiology.  Monitor for malignant catatonia, dc APs if vitals escalate         LABS AND IMAGAING:    Labs Reviewed   VALPROIC ACID - Abnormal; Notable for the following:        Result Value    Valproic acid 3 (*)     All other components within normal limits   GLUCOSE, POC - Abnormal; Notable for the following:     Glucose (POC) 115 (*)     All other components within normal limits   GLUCOSE, POC - Abnormal; Notable for the following:     Glucose (POC) 104 (*)     All other components within normal limits   GLUCOSE, POC - Abnormal; Notable for the following:     Glucose (POC) 127 (*)     All other components within normal limits   GLUCOSE, POC - Abnormal; Notable for the following:     Glucose (POC) 110 (*)     All other components within normal limits   GLUCOSE, POC - Abnormal; Notable for the following:     Glucose (POC) 114 (*)     All other components within normal limits   GLUCOSE, POC - Abnormal; Notable for the following:     Glucose (POC) 101 (*)     All other components within normal limits   GLUCOSE, POC - Abnormal; Notable for the following:     Glucose (POC) 114 (*)     All other components within normal limits   GLUCOSE, POC - Abnormal; Notable for the following:     Glucose (POC) 110 (*)     All other components within normal limits   GLUCOSE, POC - Abnormal; Notable for the following:     Glucose (POC) 135 (*)     All other components within normal limits   GLUCOSE, POC - Abnormal; Notable for the following:     Glucose (POC) 113 (*)     All other components within normal limits   GLUCOSE, POC - Abnormal; Notable for the following:     Glucose (POC) 118 (*)     All other components within normal limits   GLUCOSE, POC - Abnormal; Notable for the following:     Glucose (POC) 106 (*)     All other components within normal limits   GLUCOSE, POC - Abnormal; Notable for the following:     Glucose (POC) 107 (*)     All other components within normal limits   GLUCOSE, POC - Abnormal; Notable for the following:     Glucose (POC) 109 (*)     All other components within normal limits   LIPID PANEL   TSH 3RD GENERATION   GLUCOSE, FASTING   VALPROIC ACID   GLUCOSE, POC   GLUCOSE, POC   GLUCOSE, POC   GLUCOSE, POC   GLUCOSE, POC   GLUCOSE, POC   GLUCOSE, POC   GLUCOSE, POC   GLUCOSE, POC   POC GLUCOSE   POC GLUCOSE   POC GLUCOSE   POC GLUCOSE   POC GLUCOSE   POC GLUCOSE   POC GLUCOSE   POC GLUCOSE   POC GLUCOSE   POC GLUCOSE   POC GLUCOSE   POC GLUCOSE   POC GLUCOSE   POC GLUCOSE   POC GLUCOSE   POC GLUCOSE   POC GLUCOSE   POC GLUCOSE   POC GLUCOSE   POC GLUCOSE   POC GLUCOSE   POC GLUCOSE   POC GLUCOSE     Lab Results   Component Value Date/Time    Valproic acid 64 02/06/2017 04:46 AM     Admission on 01/27/2017   Component Date Value Ref Range Status    Glucose (POC) 01/28/2017 115* 65 - 100 mg/dL Final    Performed by 01/28/2017 Fabiola Bose   Final    Glucose (POC) 01/29/2017 104* 65 - 100 mg/dL Final    Performed by 01/29/2017 Vijay Castillo [facility]   Final    LIPID PROFILE 01/30/2017        Final    Cholesterol, total 01/30/2017 122  <200 MG/DL Final    Triglyceride 01/30/2017 107  <150 MG/DL Final    HDL Cholesterol 01/30/2017 42  MG/DL Final    LDL, calculated 01/30/2017 58.6  0 - 100 MG/DL Final    VLDL, calculated 01/30/2017 21.4  MG/DL Final    CHOL/HDL Ratio 01/30/2017 2.9  0 - 5.0   Final    TSH 01/30/2017 0.70  0.36 - 3.74 uIU/mL Final    Glucose 01/30/2017 97  65 - 100 MG/DL Final    Glucose (POC) 01/30/2017 127* 65 - 100 mg/dL Final    Performed by 01/30/2017 Mealy Cat   Final    Glucose (POC) 01/30/2017 100  65 - 100 mg/dL Final    Performed by 01/30/2017 [place]   Final    Glucose (POC) 01/31/2017 89  65 - 100 mg/dL Final    Performed by 01/31/2017 [place]   Final    Glucose (POC) 02/01/2017 110* 65 - 100 mg/dL Final    Performed by 02/01/2017 Luna Oiler   Final    Valproic acid 02/02/2017 3* 50 - 100 ug/ml Final    Glucose (POC) 02/02/2017 114* 65 - 100 mg/dL Final    Performed by 02/02/2017 Luna Oiler   Final    Glucose (POC) 02/03/2017 101* 65 - 100 mg/dL Final    Performed by 02/03/2017 Bettyrosemary Ana   Final    Glucose (POC) 02/03/2017 91  65 - 100 mg/dL Final    Performed by 02/03/2017 Bettyann Ana   Final    Glucose (POC) 02/03/2017 114* 65 - 100 mg/dL Final    Performed by 02/03/2017 Bettyann Ana   Final    Glucose (POC) 02/04/2017 91  65 - 100 mg/dL Final    Performed by 02/04/2017 Bettyann Ana   Final    Glucose (POC) 02/04/2017 94  65 - 100 mg/dL Final    Performed by 02/04/2017 Bettyann Ana   Final    Glucose (POC) 02/05/2017 94  65 - 100 mg/dL Final    Performed by 02/05/2017 Bettyann Ana   Final    Glucose (POC) 02/05/2017 89  65 - 100 mg/dL Final    Performed by 02/05/2017 Luna Oiler   Final    Valproic acid 02/06/2017 64  50 - 100 ug/ml Final    Glucose (POC) 02/06/2017 110* 65 - 100 mg/dL Final    Performed by 02/06/2017 Jamilah Panfilo   Final    Glucose (POC) 02/07/2017 100  65 - 100 mg/dL Final    Performed by 02/07/2017 Mealy Cat   Final    Glucose (POC) 02/07/2017 135* 65 - 100 mg/dL Final    Performed by 02/07/2017 Mealy Cat   Final    Glucose (POC) 02/07/2017 113* 65 - 100 mg/dL Final    Performed by 02/07/2017 Je Clancy   Final    Glucose (POC) 02/08/2017 118* 65 - 100 mg/dL Final    Performed by 02/08/2017 Lorri Cr    Glucose (POC) 02/08/2017 106* 65 - 100 mg/dL Final    Performed by 02/08/2017 Barbie Sanders   Final    Glucose (POC) 02/09/2017 90  65 - 100 mg/dL Final    Performed by 02/09/2017 Annamaria Shelton   Final    Glucose (POC) 02/09/2017 107* 65 - 100 mg/dL Final    Performed by 02/09/2017 Kandi Capone   Final    Glucose (POC) 02/10/2017 109* 65 - 100 mg/dL Final    Performed by 02/10/2017 Brett Young   Final     No results found. DISPOSITION:    Home. Patient to f/u with psychiatric, and psychotherapy appointments. Patient is to f/u with internist as directed. Patient should have a depakote level and associated labs checked within the next 1-2 weeks by patient's o/p psychiatrist/internist.               FOLLOW-UP CARE:    Activity as tolerated  Resume previous diet  Wound Care: none needed. Follow-up Information     Follow up With Details Comments Contact Info    None   None ([phone]) Patient stated that they have no PCP                   PROGNOSIS:   Guarded / Poor---- based on nature of patient's pathology/ies and treatment compliance issues. Prognosis is greatly dependent upon patient's ability to follow up with drug/etoh rehabilitation and psychiatric/psychotherapy appointments as well as to comply with psychiatric medications as prescribed. DISCHARGE MEDICATIONS:     Informed consent given for the use of following psychotropic medications:  Current Discharge Medication List      START taking these medications    Details   divalproex ER (DEPAKOTE ER) 500 mg ER tablet Take 3 Tabs by mouth nightly. Indications: BIPOLAR DISORDER IN REMISSION  Qty: 42 Tab, Refills: 1         CONTINUE these medications which have CHANGED    Details   risperiDONE (RISPERDAL) 4 mg tablet Take 1 Tab by mouth nightly.  Indications: BIPOLAR DISORDER IN REMISSION, SCHIZOPHRENIA  Qty: 14 Tab, Refills: 1         CONTINUE these medications which have NOT CHANGED    Details   metoprolol tartrate (LOPRESSOR) 25 mg tablet Take 0.5 Tabs by mouth every twelve (12) hours. Indications: HYPERTENSION  Qty: 30 Tab, Refills: 0      aspirin (ASPIRIN) 325 mg tablet Take 325 mg by mouth daily. Indications: MYOCARDIAL INFARCTION PREVENTION      simvastatin (ZOCOR) 40 mg tablet Take 40 mg by mouth nightly. Indications: HYPERCHOLESTEROLEMIA      cholecalciferol, vitamin D3, 2,000 unit tab Take 8,000 Int'l Units by mouth daily. Indications: PREVENTION OF VITAMIN D DEFICIENCY      magnesium oxide (MAG-OX) 400 mg tablet Take 400 mg by mouth daily. Indications: HYPOMAGNESEMIA         STOP taking these medications       DULoxetine (CYMBALTA) 30 mg capsule Comments:   Reason for Stopping:         DULoxetine (CYMBALTA) 60 mg capsule Comments:   Reason for Stopping:         hydrOXYzine (ATARAX) 50 mg tablet Comments:   Reason for Stopping:         melatonin 3 mg tablet Comments:   Reason for Stopping:         omeprazole (PRILOSEC) 20 mg capsule Comments:   Reason for Stopping:                      A coordinated, multidisplinary treatment team round was conducted with Marty Ruff is done daily here at [facility]. This team consists of the nurse, psychiatric unit pharmcist,  and writer. I have spent greater than 35 minutes on discharge work.     Signed:  Madisyn Fournier MD  2/10/2017

## 2017-02-10 NOTE — BH NOTES
Pt is alert and oriented x 4, meds/meals compliant, denied SI/HI. Pt is discharged home today awaiting transport.

## 2017-02-10 NOTE — PROGRESS NOTES
Problem: Altered Thought Process (Adult/Pediatric)  Goal: *STG: Remains safe in hospital  Outcome: Progressing Towards Goal  Pt is alert and oriented. Denies SI/HI and contracts for safety. Pt denies psychotic symptoms but is preoccupied and guarded. Pt is able to reality test. Pt is compliant with medication and meals. Pt is isolative to room. Pt is self care with ADLS. Continue to assess mood an behavior. Assist to reality test. Monitor on Q 15 checks.

## 2017-02-10 NOTE — DISCHARGE INSTRUCTIONS
DISCHARGE SUMMARY from Nurse    The following personal items are in your possession at time of discharge:    Dental Appliances: None  Visual Aid: None     Home Medications: None  Jewelry: None     Other Valuables: Keys  Personal Items Sent to Safe:  (keys)    PATIENT INSTRUCTIONS:    After general anesthesia or intravenous sedation, for 24 hours or while taking prescription Narcotics:  · Limit your activities  · Do not drive and operate hazardous machinery  · Do not make important personal or business decisions  · Do  not drink alcoholic beverages  · If you have not urinated within 8 hours after discharge, please contact your surgeon on call. Report the following to your surgeon:  · Excessive pain, swelling, redness or odor of or around the surgical area  · Temperature over 100.5  · Nausea and vomiting lasting longer than 4 hours or if unable to take medications  · Any signs of decreased circulation or nerve impairment to extremity: change in color, persistent  numbness, tingling, coldness or increase pain  · Any questions    *  Please give a list of your current medications to your Primary Care Provider. *  Please update this list whenever your medications are discontinued, doses are      changed, or new medications (including over-the-counter products) are added. *  Please carry medication information at all times in case of emergency situations. These are general instructions for a healthy lifestyle:    No smoking/ No tobacco products/ Avoid exposure to second hand smoke    Surgeon General's Warning:  Quitting smoking now greatly reduces serious risk to your health.     Obesity, smoking, and sedentary lifestyle greatly increases your risk for illness    A healthy diet, regular physical exercise & weight monitoring are important for maintaining a healthy lifestyle    You may be retaining fluid if you have a history of heart failure or if you experience any of the following symptoms:  Weight gain of 3 pounds or more overnight or 5 pounds in a week, increased swelling in our hands or feet or shortness of breath while lying flat in bed. Please call your doctor as soon as you notice any of these symptoms; do not wait until your next office visit. Recognize signs and symptoms of STROKE:    F-face looks uneven    A-arms unable to move or move unevenly    S-speech slurred or non-existent    T-time-call 911 as soon as signs and symptoms begin-DO NOT go       Back to bed or wait to see if you get better-TIME IS BRAIN. Warning Signs of HEART ATTACK     Call 911 if you have these symptoms:   Chest discomfort. Most heart attacks involve discomfort in the center of the chest that lasts more than a few minutes, or that goes away and comes back. It can feel like uncomfortable pressure, squeezing, fullness, or pain.  Discomfort in other areas of the upper body. Symptoms can include pain or discomfort in one or both arms, the back, neck, jaw, or stomach.  Shortness of breath with or without chest discomfort.  Other signs may include breaking out in a cold sweat, nausea, or lightheadedness. Don't wait more than five minutes to call 911 - MINUTES MATTER! Fast action can save your life. Calling 911 is almost always the fastest way to get lifesaving treatment. Emergency Medical Services staff can begin treatment when they arrive -- up to an hour sooner than if someone gets to the hospital by car. If I feel that I can not keep these promises and I am at risk of hurting myself or others, I will call the crisis office and speak with a crisis worker who will assist me during my crisis. [facility]                [address] Crisis            [phone]    The discharge information has been reviewed with the patient.   The patient verbalized understanding. Discharge medications reviewed with the patient and appropriate educational materials and side effects teaching were provided.

## 2017-02-10 NOTE — BH NOTES
GROUP THERAPY PROGRESS NOTE    Ada Christie is participating in [place].      Group time: 30 minutes    Personal goal for participation:     Goal orientation: community    Group therapy participation: minimal    Therapeutic interventions reviewed and discussed:     Impression of participation: fair

## 2017-02-13 NOTE — BH NOTES
Social Work     Met with pt in team for treatment and discharge paperwork. Pt will be discharged today. Pt will be transported home via [employer] ticket. Pt's transportation home was approved by administration as pt did not have any friends/family able to pick her up and there were no buses/trains that travelled to [place], [place]. Pt is alert and oriented x3. Pt denies SI/HI. Pt's mood is euthymic, slightly labile; affect is euthymic. Thought process is logical, abstract reasoning is poor. Insight and judgment is limited, reliability is fair. Pt is in agreement with discharge plan.  spoke with Misael Sandhu, supervisor of [facility] PACT Team (phone: [phone]; fax: [phone]), to review discharge plans. Pt's discharge medications were filled via voucher from [address] per PACT Team's request.     Follow Up:    Cassie Maciel  [address] [facility], [address] [facility]  Phone: [phone]  Fax: [phone] [facility]'s PACT Team will go to pt's home on Saturday 2/11/16 between 11:00am-12:00pm for a follow-up case management appointment. Continuing care paperwork has been faxed to provider.

## 2020-06-23 NOTE — BH NOTES
06/23/20 1019   Time Spent With Patient   Time In 0922   Time Out 1000   Patient Seen For: AM   Mental Status   Neurologic State Alert   Orientation Level Oriented X4   Cognition Memory loss;Decreased attention/concentration   Perception Visual  (dysmetria)   Perseveration Verbal cues provided   Safety/Judgement Home safety        06/23/20 1020   Verbal Organization Exercises   Functional Sequencing Accuracy (%) 75 %   Attention Exercises Performed   Accuracy (%) 60 %   Neuro-Linguistic Exercises   Verbal Organization Impaired   Memory Impaired   Attention  Impaired     Patient participated with cognitive treatment focusing on attention, problem solving, and immediate recall. Following three step aural directions during writing task with use of memory strategies completed with 60% accuracy. 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 Sequencing six written steps to complete an activity required min-mod cues. Patient self-corrected x1. Required increased time to complete. Patient was able to recall what the main topics were during the sequencing task after writing the information down and discussing at the beginning of the session. Recommend continued therapy to address cognitive function.     Gin Mullins MS, CCC-SLP Patient is up ad naty visible on the unit with dull/flat affect and mood. She has been smiling most of the morning stating she is looking forward to discharge today. Accucheck was 109 this morning ; she is compliant with medications and meals. No c/o pain, SI/HI or A/V halllucinations. She will be monitored q15min and PRN for safety.

## 2023-04-26 NOTE — BH NOTES
PSYCHIATRIC PROGRESS NOTE         Patient Name  Raphael Sever   Date of Birth 1960   [facility] [id number]   Medical Record Number  [id number]      Age  49 y.o. PCP None   Admit date:  1/27/2017    Room Number  164/39  @ [facility]   Date of Service  1/31/2017          PSYCHOTHERAPY SESSION NOTE:  Length of psychotherapy session: 20 minutes    Main condition/diagnosis/issues treated during session today, 1/31/2017 : raeann, tx , psychosis    I employed Cognitive Behavioral therapy techniques, Reality-Oriented psychotherapy, as well as supportive psychotherapy in regards to various ongoing psychosocial stressors, including the following: pre-admission and current problems; housing issues; occupational issues; medical issues; and stress of hospitalization. Interpersonal relationship issues and psychodynamic conflicts explored. Attempts made to alleviate maladaptive patterns. Overall, patient is minially progressing    Treatment Plan Update (reviewed an updated 1/31/2017) : I will modify psychotherapy tx plan by implementing more stress management strategies, building upon cognitive behavioral techniques, increasing coping skills, as well as shoring up psychological defenses). An extended energy and skill set was needed to engage pt in psychotherapy due to some of the following: resistiveness, complexity, negativity, confrontational nature, hostile behaviors, and/or severe abnormalities in thought processes/psychosis resulting in the loss of expressive/receptive language communication skills. E & M PROGRESS NOTE:         HISTORY       CC:  \"i'm eating\"  HISTORY OF PRESENT ILLNESS/INTERVAL HISTORY:  (reviewed/updated 1/31/2017). per initial evaluation: The patient, Raphael Sever, is a 49 y.o.  WHITE OR   WHITE OR  female with a past psychiatric history significant for bipolar vs schizophrenia, who presents at this time with complaints of (and/or evidence of) the following emotional symptoms: psychotic behavior, paranoid behavior, raeann, psychosis and catatonia. Additional symptomatology include loud, hyperverbal, combative, demanding, entitled, anger outbursts, difficulty sleeping and hearing voices. The above symptoms have been present for a week. These symptoms are of severe severity. These symptoms are constant in nature. The patient's condition has been precipitated by psychosocial stressors. Patient's condition made worse by treatment noncompliance. UDS +negative; BAL=0. Pt given IM psych meds on adm due to severe combativeness. Shirin Mahajan presents/reports/evidences the following emotional symptoms today, 1/31/2017:raeann and psychosis. The above symptoms have been present for weeks. These symptoms are of severe severity. The symptoms are constant in nature. Additional symptomatology and features include blocking, paranoid, aud north--internally preoc, labile moods. SIDE EFFECTS: (reviewed/updated 1/31/2017)  None reported or admitted to. No noted toxicity with use of Depakote   ALLERGIES:(reviewed/updated 1/31/2017)  Allergies   Allergen Reactions    Augmentin [Amoxicillin-Pot Clavulanate] Rash    Codeine Nausea Only    Cogentin [Benztropine] Other (comments)     Makes her sick    Iodine Other (comments)    Macrodantin [Nitrofurantoin Macrocrystalline] Rash    Morphine Shortness of Breath    Mucinex [Guaifenesin] Shortness of Breath    Oxycodone Other (comments)    Prednisone Anxiety      MEDICATIONS PRIOR TO ADMISSION:(reviewed/updated 1/31/2017)  Prescriptions Prior to Admission   Medication Sig    metoprolol tartrate (LOPRESSOR) 25 mg tablet Take 0.5 Tabs by mouth every twelve (12) hours. Indications: HYPERTENSION    risperiDONE (RISPERDAL) 2 mg tablet Take 1 Tab by mouth two (2) times a day. Indications: BIPOLAR DISORDER IN REMISSION, SCHIZOPHRENIA    DULoxetine (CYMBALTA) 30 mg capsule Take 1 Cap by mouth nightly. Indications: ANXIETY WITH DEPRESSION, DEPRESSION    DULoxetine (CYMBALTA) 60 mg capsule Take 1 Cap by mouth daily. Indications: ANXIETY WITH DEPRESSION, DEPRESSION    aspirin (ASPIRIN) 325 mg tablet Take 325 mg by mouth daily. Indications: MYOCARDIAL INFARCTION PREVENTION    hydrOXYzine (ATARAX) 50 mg tablet Take 50 mg by mouth every six (6) hours as needed for Anxiety. Indications: ANXIETY    melatonin 3 mg tablet Take 3 mg by mouth nightly. Indications: INSOMNIA    omeprazole (PRILOSEC) 20 mg capsule Take 20 mg by mouth daily. Indications: HEARTBURN    simvastatin (ZOCOR) 40 mg tablet Take 40 mg by mouth nightly. Indications: HYPERCHOLESTEROLEMIA    cholecalciferol, vitamin D3, 2,000 unit tab Take 8,000 Int'l Units by mouth daily. Indications: PREVENTION OF VITAMIN D DEFICIENCY    magnesium oxide (MAG-OX) 400 mg tablet Take 400 mg by mouth daily. Indications: HYPOMAGNESEMIA      PAST MEDICAL HISTORY: Past medical history from the initial psychiatric evaluation has been reviewed (reviewed/updated 1/31/2017) with no additional updates (I asked patient and no additional past medical history provided). Past Medical History   Diagnosis Date    Anxiety     Asthma     Bipolar affective disorder ([address]     Chronic back pain     CVA (cerebral vascular accident) ([address]     Diabetes mellitus, type 2 ([address]     Dyslipidemia     Fibromyalgia     GERD (gastroesophageal reflux disease)     Hypertension     Schizophrenia ([address]      Past Surgical History   Procedure Laterality Date    Hx knee arthroscopy      Hx bunionectomy      Hx cholecystectomy      Hx tubal ligation      Hx hysterectomy      Hx other surgical       orbit blow out repair. SOCIAL HISTORY: Social history from the initial psychiatric evaluation has been reviewed (reviewed/updated 1/31/2017) with no additional updates (I asked patient and no additional social history provided).    Social History     Social History    Marital status: SINGLE     Spouse name: N/A    Number of children: N/A    Years of education: N/A     Occupational History    Not on file. Social History Main Topics    Smoking status: Current Every Day Smoker     Types: Cigarettes    Smokeless tobacco: Not on file    Alcohol use Not on file    Drug use: Yes     Special: Opiates    Sexual activity: Not on file     Other Topics Concern    Not on file     Social History Narrative    [place] . 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 [employer]. HS diploma. Has one son, age 39. FAMILY HISTORY: Family history from the initial psychiatric evaluation has been reviewed (reviewed/updated 1/31/2017) with no additional updates (I asked patient and no additional family history provided). History reviewed. No pertinent family history. REVIEW OF SYSTEMS: (reviewed/updated 1/31/2017)  Appetite:improved   Sleep: improved   All other Review of Systems: Respiratory ROS: no cough, shortness of breath, or wheezing  Cardiovascular ROS: no chest pain or dyspnea on exertion  negative  Gastrointestinal ROS: no abdominal pain, change in bowel habits, or black or bloody stools  negative  Neurological ROS: no TIA or stroke symptoms  negative         [address] [place] [address] (MSE):    MSE FINDINGS ARE WITHIN NORMAL LIMITS (WNL) UNLESS OTHERWISE STATED BELOW. ( ALL OF THE BELOW CATEGORIES OF THE MSE HAVE BEEN REVIEWED (reviewed 1/31/2017) AND UPDATED AS DEEMED APPROPRIATE )  General Presentation disheveled and overweight, evasive, guarded, uncooperative and vague   Orientation disorganized   Vital Signs  See below (reviewed 1/31/2017); Vital Signs (BP, Pulse, & Temp) are within normal limits if not listed below.    Gait and Station Stable/steady, no ataxia   Musculoskeletal System No extrapyramidal symptoms (EPS); no abnormal muscular movements or Tardive Dyskinesia (TD); muscle strength and tone are within normal limits Language No aphasia or dysarthria   Speech:  hypoverbal and monotone   Thought Processes llogical; slow rate of thoughts; poor abstract reasoning/computation   Thought Associations blocked  and goal directed   Thought Content paranoid delusions and free of hallucinations, internally preoc   Suicidal Ideations no plan , no intention and none   Homicidal Ideations none   Mood:  angry, hostile , irritable and labile    Affect:  Irritable, and odd demeanor   Memory recent  impaired   Memory remote:  fair   Concentration/Attention:  fair   Fund of Knowledge below average   Insight:  limited   Reliability fair   Judgment:  limited          VITALS:     Patient Vitals for the past 24 hrs:   Temp Pulse Resp BP   01/31/17 1155 - 85 18 134/77   01/31/17 0549 97.6 °F (36.4 °C) 80 18 148/83   01/30/17 2120 - 83 - (!) 181/107     Wt Readings from Last 3 Encounters:   01/31/17 91.7 kg (202 lb 2 oz)   07/07/16 96.8 kg (213 lb 6.4 oz)     Temp Readings from Last 3 Encounters:   01/31/17 97.6 °F (36.4 °C)   07/08/16 98.2 °F (36.8 °C)     BP Readings from Last 3 Encounters:   01/31/17 134/77   07/08/16 119/60     Pulse Readings from Last 3 Encounters:   01/31/17 85   07/08/16 94            DATA     LABORATORY DATA:(reviewed/updated 1/31/2017)  No results found for this or any previous visit (from the past 24 hour(s)). No results found for: VALF2, VALAC, VALP, VALPR, DS6, CRBAM, CRBAMP, CARB2, XCRBAM  No results found for: LI, LIH, LITHM   RADIOLOGY REPORTS:(reviewed/updated 1/31/2017)  No results found.        MEDICATIONS     ALL MEDICATIONS:   Current Facility-Administered Medications   Medication Dose Route Frequency    risperiDONE (RisperDAL) tablet 3 mg  3 mg Oral BID    Or    haloperidol lactate (HALDOL) injection 2 mg +++COURT ORDERED MEDICATION FOR REFUSAL OF PO RISPERIDONE+++  2 mg IntraMUSCular BID    metoprolol tartrate (LOPRESSOR) tablet 25 mg  25 mg Oral Q12H    cloNIDine HCl (CATAPRES) tablet 0.2 mg  0.2 mg Oral Q6H PRN    aspirin (ASPIRIN) tablet 325 mg  325 mg Oral DAILY    cholecalciferol (VITAMIN D3) tablet 4,000 Units  4,000 Units Oral DAILY    pantoprazole (PROTONIX) tablet 40 mg  40 mg Oral ACB    atorvastatin (LIPITOR) tablet 40 mg  40 mg Oral QHS    insulin lispro (HUMALOG) injection   SubCUTAneous ACB&D    glucose chewable tablet 16 g  4 Tab Oral PRN    dextrose (D50W) injection syrg 12.5-25 g  12.5-25 g IntraVENous PRN    glucagon (GLUCAGEN) injection 1 mg  1 mg IntraMUSCular PRN    ziprasidone (GEODON) 20 mg in sterile water (preservative free) 1 mL injection  20 mg IntraMUSCular BID PRN    OLANZapine (ZyPREXA) tablet 5 mg  5 mg Oral Q6H PRN    LORazepam (ATIVAN) injection 2 mg  2 mg IntraMUSCular Q4H PRN    LORazepam (ATIVAN) tablet 1 mg  1 mg Oral Q4H PRN    zolpidem (AMBIEN) tablet 10 mg  10 mg Oral QHS PRN    acetaminophen (TYLENOL) tablet 650 mg  650 mg Oral Q4H PRN    ibuprofen (MOTRIN) tablet 400 mg  400 mg Oral Q8H PRN    magnesium hydroxide (MILK OF MAGNESIA) 400 mg/5 mL oral suspension 30 mL  30 mL Oral DAILY PRN    nicotine (NICODERM CQ) 21 mg/24 hr patch 1 Patch  1 Patch TransDERmal DAILY PRN    haloperidol lactate (HALDOL) injection 1 mg++Court-ordered med for refusal of Depakote++  1 mg IntraMUSCular BID    Or    divalproex ER (DEPAKOTE ER) 24 hour tablet 750 mg  750 mg Oral BID      SCHEDULED MEDICATIONS:   Current Facility-Administered Medications   Medication Dose Route Frequency    risperiDONE (RisperDAL) tablet 3 mg  3 mg Oral BID    Or    haloperidol lactate (HALDOL) injection 2 mg +++COURT ORDERED MEDICATION FOR REFUSAL OF PO RISPERIDONE+++  2 mg IntraMUSCular BID    metoprolol tartrate (LOPRESSOR) tablet 25 mg  25 mg Oral Q12H    aspirin (ASPIRIN) tablet 325 mg  325 mg Oral DAILY    cholecalciferol (VITAMIN D3) tablet 4,000 Units  4,000 Units Oral DAILY    pantoprazole (PROTONIX) tablet 40 mg  40 mg Oral ACB    atorvastatin (LIPITOR) tablet 40 mg  40 mg Oral QHS    insulin lispro (HUMALOG) injection   SubCUTAneous ACB&D    haloperidol lactate (HALDOL) injection 1 mg++Court-ordered med for refusal of Depakote++  1 mg IntraMUSCular BID    Or    divalproex ER (DEPAKOTE ER) 24 hour tablet 750 mg  750 mg Oral BID          ASSESSMENT & PLAN     DIAGNOSES REQUIRING ACTIVE TREATMENT AND MONITORING: (reviewed/updated 1/31/2017)  Patient Active Hospital Problem List:     Bipolar affective, mixed, sev w/ psych vs schizoaffective dis vs schizophrenia  Assessment: wide differential. Need more collateral info. Order old recs  Sl better, less combative, less manic, still quite psychotic. Plan: start mood stabilizer and AP along with benzos. I will monitor blood levels (Depakote---a drug with a narrow therapeutic index= NTI) and associated labs for drug therapy implemented that require intense monitoring for toxicity as deemed appropriate base on current medication side effects and pharmacodynamically determined drug 1/2 lives. Med  petition granted. Essential hypertension (7/6/2016)  Assessment: elevated  Plan: restart bp meds    Catatonic state ([facility] [address] (1/27/2017)  Assessment: related to underlying psychotic/mood dis, now resolved  Plan: tx underlying psych etiology. Monitor for malignant catatonia, dc APs if vitals escalate      In summary, Josee Kapadia, is a 49 y.o.  female who presents with a severe exacerbation of the principal diagnosis of Bipolar affective, mixed, sev w/ psych [facility])  Patient's condition is worsening/not improving/not stable . Patient requires continued inpatient hospitalization for further stabilization, safety monitoring and medication management. I will continue to coordinate the provision of individual, milieu, occupational, group, and substance abuse therapies to address target symptoms/diagnoses as deemed appropriate for the individual patient.   A coordinated, multidisplinary treatment team round was conducted with the patient (this team consists of the nurse, psychiatric unit pharmcist,  and writer). Complete current electronic health record for patient has been reviewed today including consultant notes, ancillary staff notes, nurses and psychiatric tech notes. Suicide risk assessment completed and patient deemed to be of low risk for suicide at this time. The following regarding medications was addressed during rounds with patient:   the risks and benefits of the proposed medication. The patient was given the opportunity to ask questions. Informed consent given to the use of the above medications. Will continue to adjust psychiatric and non-psychiatric medications (see above \"medication\" section and orders section for details) as deemed appropriate & based upon diagnoses and response to treatment. I will continue to order blood tests/labs and diagnostic tests as deemed appropriate and review results as they become available (see orders for details and above listed lab/test results). I will order psychiatric records from previous [facility] hospitals to further elucidate the nature of patient's psychopathology and review once available. I will gather additional collateral information from friends, family and o/p treatment team to further elucidate the nature of patient's psychopathology and baselline level of psychiatric functioning. I certify that this patient's inpatient psychiatric hospital services furnished since the previous certification were, and continue to be, required for treatment that could reasonably be expected to improve the patient's condition, or for diagnostic study, and that the patient continues to need, on a daily basis, active treatment furnished directly by or requiring the supervision of inpatient psychiatric facility personnel. In addition, the hospital records show that services furnished were intensive treatment services, admission or related services, or equivalent services.     EXPECTED DISCHARGE DATE/DAY: TBD     DISPOSITION: Home       Signed By:   Hawa Romero MD  1/31/2017 Never